# Patient Record
Sex: FEMALE | Race: WHITE
[De-identification: names, ages, dates, MRNs, and addresses within clinical notes are randomized per-mention and may not be internally consistent; named-entity substitution may affect disease eponyms.]

---

## 2019-02-05 ENCOUNTER — HOSPITAL ENCOUNTER (INPATIENT)
Dept: HOSPITAL 72 - EMR | Age: 59
LOS: 13 days | Discharge: NURSING, REHAB OR LONG-TERM CARE | DRG: 140 | End: 2019-02-18
Payer: MEDICAID

## 2019-02-05 VITALS — SYSTOLIC BLOOD PRESSURE: 121 MMHG | DIASTOLIC BLOOD PRESSURE: 73 MMHG

## 2019-02-05 VITALS — DIASTOLIC BLOOD PRESSURE: 66 MMHG | SYSTOLIC BLOOD PRESSURE: 116 MMHG

## 2019-02-05 VITALS — SYSTOLIC BLOOD PRESSURE: 135 MMHG | DIASTOLIC BLOOD PRESSURE: 60 MMHG

## 2019-02-05 VITALS — SYSTOLIC BLOOD PRESSURE: 133 MMHG | DIASTOLIC BLOOD PRESSURE: 72 MMHG

## 2019-02-05 VITALS — BODY MASS INDEX: 20.57 KG/M2 | WEIGHT: 123.44 LBS | HEIGHT: 65 IN

## 2019-02-05 VITALS — SYSTOLIC BLOOD PRESSURE: 103 MMHG | DIASTOLIC BLOOD PRESSURE: 75 MMHG

## 2019-02-05 DIAGNOSIS — J96.01: ICD-10-CM

## 2019-02-05 DIAGNOSIS — J96.02: ICD-10-CM

## 2019-02-05 DIAGNOSIS — Z59.0: ICD-10-CM

## 2019-02-05 DIAGNOSIS — J44.1: Primary | ICD-10-CM

## 2019-02-05 DIAGNOSIS — B19.20: ICD-10-CM

## 2019-02-05 DIAGNOSIS — G93.41: ICD-10-CM

## 2019-02-05 DIAGNOSIS — J18.9: ICD-10-CM

## 2019-02-05 DIAGNOSIS — J44.0: ICD-10-CM

## 2019-02-05 LAB
ADD MANUAL DIFF: NO
ALBUMIN SERPL-MCNC: 3.3 G/DL (ref 3.4–5)
ALBUMIN/GLOB SERPL: 1 {RATIO} (ref 1–2.7)
ALP SERPL-CCNC: 71 U/L (ref 46–116)
ALT SERPL-CCNC: 101 U/L (ref 12–78)
ANION GAP SERPL CALC-SCNC: 5 MMOL/L (ref 5–15)
APPEARANCE UR: CLEAR
APTT PPP: YELLOW S
AST SERPL-CCNC: 135 U/L (ref 15–37)
BASOPHILS NFR BLD AUTO: 0.7 % (ref 0–2)
BILIRUB SERPL-MCNC: 0.4 MG/DL (ref 0.2–1)
BUN SERPL-MCNC: 24 MG/DL (ref 7–18)
CALCIUM SERPL-MCNC: 9 MG/DL (ref 8.5–10.1)
CHLORIDE SERPL-SCNC: 103 MMOL/L (ref 98–107)
CO2 SERPL-SCNC: 32 MMOL/L (ref 21–32)
CREAT SERPL-MCNC: 0.7 MG/DL (ref 0.55–1.3)
EOSINOPHIL NFR BLD AUTO: 0.4 % (ref 0–3)
ERYTHROCYTE [DISTWIDTH] IN BLOOD BY AUTOMATED COUNT: 12.8 % (ref 11.6–14.8)
GLOBULIN SER-MCNC: 3.4 G/DL
GLUCOSE UR STRIP-MCNC: NEGATIVE MG/DL
HCT VFR BLD CALC: 41.5 % (ref 37–47)
HGB BLD-MCNC: 13.8 G/DL (ref 12–16)
KETONES UR QL STRIP: NEGATIVE
LEUKOCYTE ESTERASE UR QL STRIP: (no result)
LYMPHOCYTES NFR BLD AUTO: 17.3 % (ref 20–45)
MCV RBC AUTO: 87 FL (ref 80–99)
MONOCYTES NFR BLD AUTO: 6 % (ref 1–10)
NEUTROPHILS NFR BLD AUTO: 75.7 % (ref 45–75)
NITRITE UR QL STRIP: NEGATIVE
PH UR STRIP: 5 [PH] (ref 4.5–8)
PLATELET # BLD: 218 K/UL (ref 150–450)
POTASSIUM SERPL-SCNC: 3.8 MMOL/L (ref 3.5–5.1)
PROT UR QL STRIP: (no result)
RBC # BLD AUTO: 4.77 M/UL (ref 4.2–5.4)
SODIUM SERPL-SCNC: 139 MMOL/L (ref 136–145)
SP GR UR STRIP: 1.02 (ref 1–1.03)
UROBILINOGEN UR-MCNC: 4 MG/DL (ref 0–1)
WBC # BLD AUTO: 9.3 K/UL (ref 4.8–10.8)

## 2019-02-05 PROCEDURE — 83690 ASSAY OF LIPASE: CPT

## 2019-02-05 PROCEDURE — 80329 ANALGESICS NON-OPIOID 1 OR 2: CPT

## 2019-02-05 PROCEDURE — 80048 BASIC METABOLIC PNL TOTAL CA: CPT

## 2019-02-05 PROCEDURE — 84443 ASSAY THYROID STIM HORMONE: CPT

## 2019-02-05 PROCEDURE — 94640 AIRWAY INHALATION TREATMENT: CPT

## 2019-02-05 PROCEDURE — 87522 HEPATITIS C REVRS TRNSCRPJ: CPT

## 2019-02-05 PROCEDURE — 86705 HEP B CORE ANTIBODY IGM: CPT

## 2019-02-05 PROCEDURE — 94660 CPAP INITIATION&MGMT: CPT

## 2019-02-05 PROCEDURE — 85025 COMPLETE CBC W/AUTO DIFF WBC: CPT

## 2019-02-05 PROCEDURE — 94664 DEMO&/EVAL PT USE INHALER: CPT

## 2019-02-05 PROCEDURE — 87081 CULTURE SCREEN ONLY: CPT

## 2019-02-05 PROCEDURE — 83880 ASSAY OF NATRIURETIC PEPTIDE: CPT

## 2019-02-05 PROCEDURE — 86709 HEPATITIS A IGM ANTIBODY: CPT

## 2019-02-05 PROCEDURE — 93005 ELECTROCARDIOGRAM TRACING: CPT

## 2019-02-05 PROCEDURE — 86710 INFLUENZA VIRUS ANTIBODY: CPT

## 2019-02-05 PROCEDURE — 99285 EMERGENCY DEPT VISIT HI MDM: CPT

## 2019-02-05 PROCEDURE — 82962 GLUCOSE BLOOD TEST: CPT

## 2019-02-05 PROCEDURE — 71045 X-RAY EXAM CHEST 1 VIEW: CPT

## 2019-02-05 PROCEDURE — 84484 ASSAY OF TROPONIN QUANT: CPT

## 2019-02-05 PROCEDURE — 87340 HEPATITIS B SURFACE AG IA: CPT

## 2019-02-05 PROCEDURE — 36600 WITHDRAWAL OF ARTERIAL BLOOD: CPT

## 2019-02-05 PROCEDURE — 80053 COMPREHEN METABOLIC PANEL: CPT

## 2019-02-05 PROCEDURE — 94760 N-INVAS EAR/PLS OXIMETRY 1: CPT

## 2019-02-05 PROCEDURE — 86703 HIV-1/HIV-2 1 RESULT ANTBDY: CPT

## 2019-02-05 PROCEDURE — 36415 COLL VENOUS BLD VENIPUNCTURE: CPT

## 2019-02-05 PROCEDURE — 86803 HEPATITIS C AB TEST: CPT

## 2019-02-05 PROCEDURE — 85007 BL SMEAR W/DIFF WBC COUNT: CPT

## 2019-02-05 PROCEDURE — 81001 URINALYSIS AUTO W/SCOPE: CPT

## 2019-02-05 PROCEDURE — 82803 BLOOD GASES ANY COMBINATION: CPT

## 2019-02-05 PROCEDURE — 87040 BLOOD CULTURE FOR BACTERIA: CPT

## 2019-02-05 SDOH — ECONOMIC STABILITY - HOUSING INSECURITY: HOMELESSNESS: Z59.0

## 2019-02-05 NOTE — NUR
NURSE NOTES:

Received pt. from ED via [place]. Pt. transferred to bed without any incident. Report 
received from GABINO Leonard. Cardiac monitor is in placed, IV site asymtp

-------------------------------------------------------------------------------

Addendum: 02/06/19 at 0123 by Elodia RODRIGUEZ Mai, RN

-------------------------------------------------------------------------------

Received pt. from ED via gurney. Pt transferred to bed without any incident. [place] pt. to 
unit, room, and hospital policies. Received report from GABINO Leonard. Cardiac monitor in 
placed, IV site intact, asymptomatic and patent. Bed is in the lowest position and locked. 
Call light within reach. Belongings list checked and accounted. No SOB and acute distress 
noted at this time. Will contact Dr. Coombs for admission orders.

## 2019-02-05 NOTE — NUR
ED Nurse Note:

Patient bibkeenan from the [facility] complaining of generalized body pain, at time of 
arrival patient was in respiratory distress, satting at 75% on room air, 
patient was placed on a nonrebreather mask and patient's saturation went up to 
100%. patient is awake and oriented and is able to answer staff's auestions. 
denies any pain at this time

## 2019-02-05 NOTE — NUR
ED Nurse Note:



PT is transfered to TELE with RN SILVINA PASCAL. pt status, condition and vital signs, 
status, and condition are reported to ERMD and receving RN prior to transfer. 
pt vital signs are stable and pt is stable for transfer at this time.

## 2019-02-05 NOTE — EMERGENCY ROOM REPORT
History of Present Illness


General


Chief Complaint:  Generalized Weakness


Source:  EMS





Present Illness


HPI


Patient is a 58-year-old female brought in by EMS after increased difficulty 

breathing and generalized weakness.  Patient was noted to have unknown past 

medical history.  History is markedly limited by patient's mental status.  

Patient was noted to have diminished oxygen saturation.


Allergies:  


Coded Allergies:  


     No Known Allergies (Unverified , 2/5/19)





Patient History


Past Medical History:  see triage record


Reviewed Nursing Documentation:  PMH: Agreed; PSxH: Agreed





Nursing Documentation-PMH


Past Medical History:  No Stated History





Review of Systems


All Other Systems:  negative except mentioned in HPI





Physical Exam





Vital Signs








  Date Time  Temp Pulse Resp B/P (MAP) Pulse Ox O2 Delivery O2 Flow Rate FiO2


 


2/5/19 15:11 97.3 76 16 124/78 80 Room Air  


 


2/5/19 15:29        21


 


2/5/19 15:39       10.0 








Sp02 EP Interpretation:  reviewed, normal


General Appearance:  normal inspection, Chronically Ill


Head:  atraumatic


ENT:  normal ENT inspection, normal voice


Neck:  normal inspection, full range of motion, supple, no bony tend


Respiratory:  no retraction, accessory muscle use, wheezing


Cardiovascular #1:  regular rate, rhythm, no edema


Gastrointestinal:  normal inspection, normal bowel sounds, non tender, soft, no 

guarding, no hernia


Genitourinary:  no CVA tenderness


Musculoskeletal:  normal inspection, back normal, normal range of motion


Neurologic:  alert, responsive


Psychiatric:  normal inspection, judgement/insight normal, mood/affect normal


Skin:  no rash





Medical Decision Making


Diagnostic Impression:  


 Primary Impression:  


 COPD exacerbation


ER Course


Patient presented for shortness of breath.  Differential included but was not 

limited to anemia, pneumonia, pneumothorax, myocardial infarction, pericardial 

effusion, congestive heart failure, acidosis.  Because of complexity of patient'

s case laboratory testing and imaging studies were ordered.  Patient is noted 

to have some slight hyperinflation on chest x-ray.  Patient was given IV 

steroids as well as breathing treatments.  Patient's initial blood gas showed 

some elevation of her CO2.  Patient was noted to have some improvement after a 

repeat blood gas.  Patient is noted to have some increased cough.  Dr. Melia Valiente was contacted for inpatient management





Labs








Test


  2/5/19


15:35 2/5/19


18:15


 


White Blood Count


  9.3 K/UL


(4.8-10.8) 


 


 


Red Blood Count


  4.77 M/UL


(4.20-5.40) 


 


 


Hemoglobin


  13.8 G/DL


(12.0-16.0) 


 


 


Hematocrit


  41.5 %


(37.0-47.0) 


 


 


Mean Corpuscular Volume 87 FL (80-99)  


 


Mean Corpuscular Hemoglobin


  28.9 PG


(27.0-31.0) 


 


 


Mean Corpuscular Hemoglobin


Concent 33.2 G/DL


(32.0-36.0) 


 


 


Red Cell Distribution Width


  12.8 %


(11.6-14.8) 


 


 


Platelet Count


  218 K/UL


(150-450) 


 


 


Mean Platelet Volume


  6.7 FL


(6.5-10.1) 


 


 


Neutrophils (%) (Auto)


  75.7 %


(45.0-75.0) 


 


 


Lymphocytes (%) (Auto)


  17.3 %


(20.0-45.0) 


 


 


Monocytes (%) (Auto)


  6.0 %


(1.0-10.0) 


 


 


Eosinophils (%) (Auto)


  0.4 %


(0.0-3.0) 


 


 


Basophils (%) (Auto)


  0.7 %


(0.0-2.0) 


 


 


Urine Color Yellow  


 


Urine Appearance Clear  


 


Urine pH 5 (4.5-8.0)  


 


Urine Specific Gravity


  1.020


(1.005-1.035) 


 


 


Urine Protein 1+ (NEGATIVE)  


 


Urine Glucose (UA)


  Negative


(NEGATIVE) 


 


 


Urine Ketones


  Negative


(NEGATIVE) 


 


 


Urine Blood 2+ (NEGATIVE)  


 


Urine Nitrite


  Negative


(NEGATIVE) 


 


 


Urine Bilirubin


  Negative


(NEGATIVE) 


 


 


Urine Urobilinogen


  4 MG/DL


(0.0-1.0) 


 


 


Urine Leukocyte Esterase 1+ (NEGATIVE)  


 


Urine RBC


  2-4 /HPF (0 -


2) 


 


 


Urine WBC


  0-2 /HPF (0 -


2) 


 


 


Urine Squamous Epithelial


Cells Few /LPF


(NONE/OCC) 


 


 


Urine Bacteria


  Few /HPF


(NONE) 


 


 


Sodium Level


  139 MMOL/L


(136-145) 


 


 


Potassium Level


  3.8 MMOL/L


(3.5-5.1) 


 


 


Chloride Level


  103 MMOL/L


() 


 


 


Carbon Dioxide Level


  32 MMOL/L


(21-32) 


 


 


Anion Gap


  5 mmol/L


(5-15) 


 


 


Blood Urea Nitrogen


  24 mg/dL


(7-18) 


 


 


Creatinine


  0.7 MG/DL


(0.55-1.30) 


 


 


Estimat Glomerular Filtration


Rate > 60 mL/min


(>60) 


 


 


Glucose Level


  115 MG/DL


() 


 


 


Calcium Level


  9.0 MG/DL


(8.5-10.1) 


 


 


Total Bilirubin


  0.4 MG/DL


(0.2-1.0) 


 


 


Aspartate Amino Transf


(AST/SGOT) 135 U/L


(15-37) 


 


 


Alanine Aminotransferase


(ALT/SGPT) 101 U/L


(12-78) 


 


 


Alkaline Phosphatase


  71 U/L


() 


 


 


Troponin I


  0.016 ng/mL


(0.000-0.056) 


 


 


Pro-B-Type Natriuretic Peptide


  914 pg/mL


(0-125) 


 


 


Total Protein


  6.7 G/DL


(6.4-8.2) 


 


 


Albumin


  3.3 G/DL


(3.4-5.0) 


 


 


Globulin 3.4 g/dL  


 


Albumin/Globulin Ratio 1.0 (1.0-2.7)  


 


Lipase


  154 U/L


() 


 


 


Thyroid Stimulating Hormone


(TSH) 1.810 uiU/mL


(0.358-3.740) 


 


 


Serum Alcohol < 3 mg/dL  


 


Arterial Blood pH


  


  7.291


(7.350-7.450)


 


Arterial Blood Partial


Pressure CO2 


  61.7 mmHg


(35.0-45.0)


 


Arterial Blood Partial


Pressure O2 


  84.5 mmHg


(75.0-100.0)


 


Arterial Blood HCO3


  


  29.1 mmol/L


(22.0-26.0)


 


Arterial Blood Oxygen


Saturation 


  95.4 %


()


 


Arterial Blood Base Excess  1.1 (-2-2) 


 


Fredy Test  Positive 








EKG Diagnostic Results


Rate:  normal - 90


Rhythm:  NSR


ST Segments:  no acute changes





Last Vital Signs








  Date Time  Temp Pulse Resp B/P (MAP) Pulse Ox O2 Delivery O2 Flow Rate FiO2


 


2/5/19 19:58 97.7 80 19 121/73 100 Simple Mask 4.0 35








Status:  unchanged


Disposition:  ADMITTED AS INPATIENT


Condition:  Serious


Scripts


No Active Prescriptions or Reported Meds


Referrals:  


NOT CHOSEN IPA/MD,REFERRING (PCP)











Daniel Wilson MD Feb 5, 2019 22:06

## 2019-02-06 VITALS — SYSTOLIC BLOOD PRESSURE: 114 MMHG | DIASTOLIC BLOOD PRESSURE: 69 MMHG

## 2019-02-06 VITALS — DIASTOLIC BLOOD PRESSURE: 77 MMHG | SYSTOLIC BLOOD PRESSURE: 123 MMHG

## 2019-02-06 VITALS — SYSTOLIC BLOOD PRESSURE: 117 MMHG | DIASTOLIC BLOOD PRESSURE: 66 MMHG

## 2019-02-06 VITALS — SYSTOLIC BLOOD PRESSURE: 124 MMHG | DIASTOLIC BLOOD PRESSURE: 69 MMHG

## 2019-02-06 VITALS — SYSTOLIC BLOOD PRESSURE: 127 MMHG | DIASTOLIC BLOOD PRESSURE: 68 MMHG

## 2019-02-06 VITALS — DIASTOLIC BLOOD PRESSURE: 66 MMHG | SYSTOLIC BLOOD PRESSURE: 108 MMHG

## 2019-02-06 LAB
ADD MANUAL DIFF: NO
ALBUMIN SERPL-MCNC: 2.7 G/DL (ref 3.4–5)
ALBUMIN/GLOB SERPL: 0.9 {RATIO} (ref 1–2.7)
ALP SERPL-CCNC: 60 U/L (ref 46–116)
ALT SERPL-CCNC: 79 U/L (ref 12–78)
ANION GAP SERPL CALC-SCNC: 6 MMOL/L (ref 5–15)
AST SERPL-CCNC: 86 U/L (ref 15–37)
BASOPHILS NFR BLD AUTO: 0.7 % (ref 0–2)
BILIRUB SERPL-MCNC: 0.5 MG/DL (ref 0.2–1)
BUN SERPL-MCNC: 18 MG/DL (ref 7–18)
CALCIUM SERPL-MCNC: 9.2 MG/DL (ref 8.5–10.1)
CHLORIDE SERPL-SCNC: 106 MMOL/L (ref 98–107)
CO2 SERPL-SCNC: 32 MMOL/L (ref 21–32)
CREAT SERPL-MCNC: 0.7 MG/DL (ref 0.55–1.3)
EOSINOPHIL NFR BLD AUTO: 1.2 % (ref 0–3)
ERYTHROCYTE [DISTWIDTH] IN BLOOD BY AUTOMATED COUNT: 13.2 % (ref 11.6–14.8)
GLOBULIN SER-MCNC: 3 G/DL
HCT VFR BLD CALC: 38.5 % (ref 37–47)
HGB BLD-MCNC: 12.6 G/DL (ref 12–16)
LYMPHOCYTES NFR BLD AUTO: 23.4 % (ref 20–45)
MCV RBC AUTO: 88 FL (ref 80–99)
MONOCYTES NFR BLD AUTO: 7.8 % (ref 1–10)
NEUTROPHILS NFR BLD AUTO: 67 % (ref 45–75)
PLATELET # BLD: 196 K/UL (ref 150–450)
POTASSIUM SERPL-SCNC: 3.6 MMOL/L (ref 3.5–5.1)
RBC # BLD AUTO: 4.37 M/UL (ref 4.2–5.4)
SODIUM SERPL-SCNC: 144 MMOL/L (ref 136–145)
WBC # BLD AUTO: 7 K/UL (ref 4.8–10.8)

## 2019-02-06 RX ADMIN — SODIUM CHLORIDE SCH MLS/HR: 0.9 INJECTION INTRAVENOUS at 14:58

## 2019-02-06 RX ADMIN — IPRATROPIUM BROMIDE AND ALBUTEROL SULFATE SCH ML: .5; 3 SOLUTION RESPIRATORY (INHALATION) at 19:55

## 2019-02-06 NOTE — NUR
RESPIRATORY NOTE:



Patient placed on BiPAP per Dr's orders. Foam tape added around the bridge of the nose and 
around the cheeks for extra protection from the face mask. GABINO Franco made aware. Will follow 
with an ABG scheduled at 1700.

## 2019-02-06 NOTE — NUR
NURSE NOTES:



Called Dr. Rodriguez to inform him that per Kathy Hendrickson, nursing supervisor, it is best to transfer 
the patient to ICU instead of [facility]. Awaiting callback.

## 2019-02-06 NOTE — CONSULTATION
History of Present Illness


General


Date patient seen:  Feb 6, 2019


Time patient seen:  14:52


Chief Complaint:  Generalized Weakness


Referring physician:  GISELLE MARIN


Reason for Consultation:  Transaminitis





Present Illness


HPI


57 y/o female w/ hx COPD, homelessness admitted with acute hypoxemic 

respiratory failure and generalized body weakness and transaminitis.  Not able 

to give much history due to mental status.  Noted with acute hypercapnic 

respiratory failure on ABG.  CXR clear.


Allergies:  


Coded Allergies:  


     No Known Allergies (Unverified , 2/5/19)





Medication History


No Active Prescriptions or Reported Meds





Patient History


Healthcare decision maker





Resuscitation status


Full Code


Advanced Directive on File








Past Medical/Surgical History


Past Medical/Surgical History:  


(1) COPD exacerbation





Review of Systems


ROS Narrative


Unable to obtain due to patient factors





Physical Exam


General Appearance:  lethargic


HEENT:  mucous membranes moist


Neck:  supple


Respiratory/Chest:  decreased breath sounds, expiratory wheezing


Cardiovascular/Chest:  normal rate, regular rhythm


Abdomen:  soft


Extremities:  no edema





Last 24 Hour Vital Signs








  Date Time  Temp Pulse Resp B/P (MAP) Pulse Ox O2 Delivery O2 Flow Rate FiO2


 


2/6/19 08:00 97.5 102 18 117/66 (83) 97   


 


2/6/19 04:00  95      


 


2/6/19 04:00 97.9 90 19 127/68 (87) 94   


 


2/6/19 00:00 98.2 95 17 108/66 (80) 96   


 


2/6/19 00:00  95      


 


2/5/19 22:25      Nasal Cannula 3.0 32


 


2/5/19 22:25     90 Nasal Cannula 3.0 32


 


2/5/19 21:43      Nasal Cannula 2.0 


 


2/5/19 20:22  89      


 


2/5/19 20:10 98.1 87 17 116/66 (83) 94   


 


2/5/19 19:58 97.7 80 19 121/73 100 Simple Mask 4.0 35


 


2/5/19 19:57 97.7 80 19 121/73 100 Simple Mask 4.0 


 


2/5/19 18:55  87 22  100 Venturi Mask 6.0 35


 


2/5/19 18:45  91 21  95 Simple Mask 5.0 


 


2/5/19 18:21 97.7 84 20 133/72 98 Simple Mask 4.0 


 


2/5/19 17:06 97.5 90 19 135/60 98 Simple Mask 10.0 


 


2/5/19 15:39  90 20  100 Non-Rebreather 10.0 100


 


2/5/19 15:29  80 18  100 Room Air  21


 


2/5/19 15:29  80 18   Room Air  21


 


2/5/19 15:20 97.3 76 16 103/75 80 Room Air  


 


2/5/19 15:20  76 16   Room Air  


 


2/5/19 15:11 97.3 76 16 124/78 80 Room Air  

















Intake and Output  


 


 2/5/19 2/6/19





 19:00 07:00


 


Intake Total  240 ml


 


Balance  240 ml


 


  


 


Intake Oral  240 ml


 


# Voids 1 











Laboratory Tests








Test


  2/5/19


15:35 2/5/19


15:40 2/5/19


18:15 2/6/19


05:15


 


White Blood Count


  9.3 K/UL


(4.8-10.8) 


  


  7.0 K/UL


(4.8-10.8)


 


Red Blood Count


  4.77 M/UL


(4.20-5.40) 


  


  4.37 M/UL


(4.20-5.40)


 


Hemoglobin


  13.8 G/DL


(12.0-16.0) 


  


  12.6 G/DL


(12.0-16.0)


 


Hematocrit


  41.5 %


(37.0-47.0) 


  


  38.5 %


(37.0-47.0)


 


Mean Corpuscular Volume 87 FL (80-99)     88 FL (80-99)  


 


Mean Corpuscular Hemoglobin


  28.9 PG


(27.0-31.0) 


  


  28.8 PG


(27.0-31.0)


 


Mean Corpuscular Hemoglobin


Concent 33.2 G/DL


(32.0-36.0) 


  


  32.8 G/DL


(32.0-36.0)


 


Red Cell Distribution Width


  12.8 %


(11.6-14.8) 


  


  13.2 %


(11.6-14.8)


 


Platelet Count


  218 K/UL


(150-450) 


  


  196 K/UL


(150-450)


 


Mean Platelet Volume


  6.7 FL


(6.5-10.1) 


  


  7.9 FL


(6.5-10.1)


 


Neutrophils (%) (Auto)


  75.7 %


(45.0-75.0)  H 


  


  67.0 %


(45.0-75.0)


 


Lymphocytes (%) (Auto)


  17.3 %


(20.0-45.0)  L 


  


  23.4 %


(20.0-45.0)


 


Monocytes (%) (Auto)


  6.0 %


(1.0-10.0) 


  


  7.8 %


(1.0-10.0)


 


Eosinophils (%) (Auto)


  0.4 %


(0.0-3.0) 


  


  1.2 %


(0.0-3.0)


 


Basophils (%) (Auto)


  0.7 %


(0.0-2.0) 


  


  0.7 %


(0.0-2.0)


 


Urine Color Yellow     


 


Urine Appearance Clear     


 


Urine pH 5 (4.5-8.0)     


 


Urine Specific Gravity


  1.020


(1.005-1.035) 


  


  


 


 


Urine Protein


  1+ (NEGATIVE)


H 


  


  


 


 


Urine Glucose (UA)


  Negative


(NEGATIVE) 


  


  


 


 


Urine Ketones


  Negative


(NEGATIVE) 


  


  


 


 


Urine Blood


  2+ (NEGATIVE)


H 


  


  


 


 


Urine Nitrite


  Negative


(NEGATIVE) 


  


  


 


 


Urine Bilirubin


  Negative


(NEGATIVE) 


  


  


 


 


Urine Urobilinogen


  4 MG/DL


(0.0-1.0)  H 


  


  


 


 


Urine Leukocyte Esterase


  1+ (NEGATIVE)


H 


  


  


 


 


Urine RBC


  2-4 /HPF (0 -


2)  H 


  


  


 


 


Urine WBC


  0-2 /HPF (0 -


2) 


  


  


 


 


Urine Squamous Epithelial


Cells Few /LPF


(NONE/OCC) 


  


  


 


 


Urine Bacteria


  Few /HPF


(NONE) 


  


  


 


 


Sodium Level


  139 MMOL/L


(136-145) 


  


  144 MMOL/L


(136-145)


 


Potassium Level


  3.8 MMOL/L


(3.5-5.1) 


  


  3.6 MMOL/L


(3.5-5.1)


 


Chloride Level


  103 MMOL/L


() 


  


  106 MMOL/L


()


 


Carbon Dioxide Level


  32 MMOL/L


(21-32) 


  


  32 MMOL/L


(21-32)


 


Anion Gap


  5 mmol/L


(5-15) 


  


  6 mmol/L


(5-15)


 


Blood Urea Nitrogen


  24 mg/dL


(7-18)  H 


  


  18 mg/dL


(7-18)


 


Creatinine


  0.7 MG/DL


(0.55-1.30) 


  


  0.7 MG/DL


(0.55-1.30)


 


Estimat Glomerular Filtration


Rate > 60 mL/min


(>60) 


  


  > 60 mL/min


(>60)


 


Glucose Level


  115 MG/DL


()  H 


  


  78 MG/DL


()


 


Calcium Level


  9.0 MG/DL


(8.5-10.1) 


  


  9.2 MG/DL


(8.5-10.1)


 


Total Bilirubin


  0.4 MG/DL


(0.2-1.0) 


  


  0.5 MG/DL


(0.2-1.0)


 


Aspartate Amino Transf


(AST/SGOT) 135 U/L


(15-37)  H 


  


  86 U/L (15-37)


H


 


Alanine Aminotransferase


(ALT/SGPT) 101 U/L


(12-78)  H 


  


  79 U/L (12-78)


H


 


Alkaline Phosphatase


  71 U/L


() 


  


  60 U/L


()


 


Troponin I


  0.016 ng/mL


(0.000-0.056) 


  


  


 


 


Pro-B-Type Natriuretic Peptide


  914 pg/mL


(0-125)  H 


  


  


 


 


Total Protein


  6.7 G/DL


(6.4-8.2) 


  


  5.7 G/DL


(6.4-8.2)  L


 


Albumin


  3.3 G/DL


(3.4-5.0)  L 


  


  2.7 G/DL


(3.4-5.0)  L


 


Globulin 3.4 g/dL     3.0 g/dL  


 


Albumin/Globulin Ratio


  1.0 (1.0-2.7)  


  


  


  0.9 (1.0-2.7)


L


 


Lipase


  154 U/L


() 


  


  


 


 


Thyroid Stimulating Hormone


(TSH) 1.810 uiU/mL


(0.358-3.740) 


  


  


 


 


Serum Alcohol < 3 mg/dL     


 


Arterial Blood pH


  


  7.269


(7.350-7.450) 7.291


(7.350-7.450) 


 


 


Arterial Blood Partial


Pressure CO2 


  66.7 mmHg


(35.0-45.0)  *H 61.7 mmHg


(35.0-45.0)  *H 


 


 


Arterial Blood Partial


Pressure O2 


  108.9 mmHg


(75.0-100.0)  H 84.5 mmHg


(75.0-100.0) 


 


 


Arterial Blood HCO3


  


  29.9 mmol/L


(22.0-26.0)  H 29.1 mmol/L


(22.0-26.0)  H 


 


 


Arterial Blood Oxygen


Saturation 


  97.3 %


() 95.4 %


() 


 


 


Arterial Blood Base Excess  1.2 (-2-2)   1.1 (-2-2)   


 


Fredy Test  Positive   Positive   











Microbiology








 Date/Time


Source Procedure


Growth Status


 


 


 2/5/19 15:20


Nasal Nares Influenza Types A,B Antigen (DANIELA) - Final Complete








Height (Feet):  5


Height (Inches):  5.00


Weight (Pounds):  123


Medications





Current Medications








 Medications


  (Trade)  Dose


 Ordered  Sig/Mona


 Route


 PRN Reason  Start Time


 Stop Time Status Last Admin


Dose Admin


 


 Albuterol/


 Ipratropium


  (Albuterol/


 Ipratropium)  3 ml  Q4H  PRN


 HHN


 Shortness of Breath  2/5/19 20:45


 2/10/19 20:44   


 


 


 Albuterol/


 Ipratropium


  (Albuterol/


 Ipratropium)  3 ml  Q6HRT


 HHN


   2/6/19 19:00


 2/11/19 18:59 UNV  


 


 


 Azithromycin


  (Zithromax)  250 mg  DAILY


 ORAL


   2/7/19 09:00


 2/14/19 08:59   


 


 


 Azithromycin


  (Zithromax)  500 mg  ONCE


 ORAL


   2/6/19 13:30


 2/6/19 15:00   


 


 


 Ceftriaxone


 Sodium 1 gm/


 Dextrose  55 ml @ 


 110 mls/hr  Q24H


 IVPB


   2/6/19 14:30


 2/13/19 14:29   


 


 


 Ibuprofen


  (Advil)  400 mg  Q6H  PRN


 ORAL


 For Pain  2/5/19 20:45


 3/7/19 20:44   


 


 


 Prednisone


  (predniSONE)  60 mg  ONCE  ONCE


 ORAL


   2/6/19 15:00


 2/6/19 15:01 UNV  


 











Assessment/Plan


Assessment/Plan


Problem List:


1. Acute hypoxemic and hypercapnic respiratory failure


2. COPD w/ acute exacerbation


3. Weakness/fatigue


4. Transaminitis





Plan:


-bipap 12/5 


-monitor ABG


-prednisone 60 mg daily and taper as tolerated


-Monitor LFTs


-abx: ceftriaxone/azithro


-f/u cultures


-monitor mental status











Benji Rodriguez MD Feb 6, 2019 14:56

## 2019-02-06 NOTE — NUR
Social Service Note



SW attempt to meet with patient to screen for homelessness.  Patient only responsive to name 
when called.  Patient only briefly opened her eyes and mumbled a few words.  Patient 
appeared to have vomited.  Charge nurse notified.  Will monitor and follow up once patient 
is more alert.

## 2019-02-06 NOTE — GI INITIAL CONSULT NOTE
History of Present Illness


General


Date patient seen:  Feb 6, 2019


Time patient seen:  13:28


Reason for Hospitalization:  Generalized Weakness


Referring physician:  GISELLE MARIN


Reason for Consultation:  Transaminitis





Present Illness


HPI


Patient is a 58-year-old female brought in by EMS after increased difficulty 

breathing and generalized weakness.  Patient was noted to have unknown past 

medical history.  History is markedly limited by patient's mental status.  

Patient was noted to have diminished oxygen saturation.


GI consulted for transaminitis.  Patient seen, awake alert and oriented no 

apparent distress, no SOB.   no active signs or symptoms of nausea vomiting.  

Patient denies any abdominal pain, denies any  constipation or diarrhea.  Labs 

reviewed; no anemia.  No leukocytosis.  Mild transaminitis.  No history of 

endoscopic colonoscopy.


Home Meds


No Active Prescriptions or Reported Meds


Med list reviewed/reconciled:  Yes


Allergies:  


Coded Allergies:  


     No Known Allergies (Unverified , 2/5/19)





Patient History


History Provided By:  Patient, Medical Record


PMH Narrative


Past Medical History:  see triage record


Reviewed Nursing Documentation:  PMH: Agreed; PSxH: Agreed





Nursing Documentation-PMH


Past Medical History:  No Stated History


Social History:  Denies: smoking, alcohol use, drug use, other





Review of Systems


All Other Systems:  negative except mentioned in HPI





Physical Exam





Vital Signs








  Date Time  Temp Pulse Resp B/P (MAP) Pulse Ox O2 Delivery O2 Flow Rate FiO2


 


2/5/19 15:11 97.3 76 16 124/78 80 Room Air  


 


2/5/19 15:29        21


 


2/5/19 15:39       10.0 








Sp02 EP Interpretation:  reviewed, normal


Labs





Laboratory Tests








Test


  2/5/19


15:35 2/5/19


15:40 2/5/19


18:15 2/6/19


05:15


 


White Blood Count


  9.3 K/UL


(4.8-10.8) 


  


  7.0 K/UL


(4.8-10.8)


 


Red Blood Count


  4.77 M/UL


(4.20-5.40) 


  


  4.37 M/UL


(4.20-5.40)


 


Hemoglobin


  13.8 G/DL


(12.0-16.0) 


  


  12.6 G/DL


(12.0-16.0)


 


Hematocrit


  41.5 %


(37.0-47.0) 


  


  38.5 %


(37.0-47.0)


 


Mean Corpuscular Volume 87 FL (80-99)     88 FL (80-99)  


 


Mean Corpuscular Hemoglobin


  28.9 PG


(27.0-31.0) 


  


  28.8 PG


(27.0-31.0)


 


Mean Corpuscular Hemoglobin


Concent 33.2 G/DL


(32.0-36.0) 


  


  32.8 G/DL


(32.0-36.0)


 


Red Cell Distribution Width


  12.8 %


(11.6-14.8) 


  


  13.2 %


(11.6-14.8)


 


Platelet Count


  218 K/UL


(150-450) 


  


  196 K/UL


(150-450)


 


Mean Platelet Volume


  6.7 FL


(6.5-10.1) 


  


  7.9 FL


(6.5-10.1)


 


Neutrophils (%) (Auto)


  75.7 %


(45.0-75.0)  H 


  


  67.0 %


(45.0-75.0)


 


Lymphocytes (%) (Auto)


  17.3 %


(20.0-45.0)  L 


  


  23.4 %


(20.0-45.0)


 


Monocytes (%) (Auto)


  6.0 %


(1.0-10.0) 


  


  7.8 %


(1.0-10.0)


 


Eosinophils (%) (Auto)


  0.4 %


(0.0-3.0) 


  


  1.2 %


(0.0-3.0)


 


Basophils (%) (Auto)


  0.7 %


(0.0-2.0) 


  


  0.7 %


(0.0-2.0)


 


Urine Color Yellow     


 


Urine Appearance Clear     


 


Urine pH 5 (4.5-8.0)     


 


Urine Specific Gravity


  1.020


(1.005-1.035) 


  


  


 


 


Urine Protein


  1+ (NEGATIVE)


H 


  


  


 


 


Urine Glucose (UA)


  Negative


(NEGATIVE) 


  


  


 


 


Urine Ketones


  Negative


(NEGATIVE) 


  


  


 


 


Urine Blood


  2+ (NEGATIVE)


H 


  


  


 


 


Urine Nitrite


  Negative


(NEGATIVE) 


  


  


 


 


Urine Bilirubin


  Negative


(NEGATIVE) 


  


  


 


 


Urine Urobilinogen


  4 MG/DL


(0.0-1.0)  H 


  


  


 


 


Urine Leukocyte Esterase


  1+ (NEGATIVE)


H 


  


  


 


 


Urine RBC


  2-4 /HPF (0 -


2)  H 


  


  


 


 


Urine WBC


  0-2 /HPF (0 -


2) 


  


  


 


 


Urine Squamous Epithelial


Cells Few /LPF


(NONE/OCC) 


  


  


 


 


Urine Bacteria


  Few /HPF


(NONE) 


  


  


 


 


Sodium Level


  139 MMOL/L


(136-145) 


  


  144 MMOL/L


(136-145)


 


Potassium Level


  3.8 MMOL/L


(3.5-5.1) 


  


  3.6 MMOL/L


(3.5-5.1)


 


Chloride Level


  103 MMOL/L


() 


  


  106 MMOL/L


()


 


Carbon Dioxide Level


  32 MMOL/L


(21-32) 


  


  32 MMOL/L


(21-32)


 


Anion Gap


  5 mmol/L


(5-15) 


  


  6 mmol/L


(5-15)


 


Blood Urea Nitrogen


  24 mg/dL


(7-18)  H 


  


  18 mg/dL


(7-18)


 


Creatinine


  0.7 MG/DL


(0.55-1.30) 


  


  0.7 MG/DL


(0.55-1.30)


 


Estimat Glomerular Filtration


Rate > 60 mL/min


(>60) 


  


  > 60 mL/min


(>60)


 


Glucose Level


  115 MG/DL


()  H 


  


  78 MG/DL


()


 


Calcium Level


  9.0 MG/DL


(8.5-10.1) 


  


  9.2 MG/DL


(8.5-10.1)


 


Total Bilirubin


  0.4 MG/DL


(0.2-1.0) 


  


  0.5 MG/DL


(0.2-1.0)


 


Aspartate Amino Transf


(AST/SGOT) 135 U/L


(15-37)  H 


  


  86 U/L (15-37)


H


 


Alanine Aminotransferase


(ALT/SGPT) 101 U/L


(12-78)  H 


  


  79 U/L (12-78)


H


 


Alkaline Phosphatase


  71 U/L


() 


  


  60 U/L


()


 


Troponin I


  0.016 ng/mL


(0.000-0.056) 


  


  


 


 


Pro-B-Type Natriuretic Peptide


  914 pg/mL


(0-125)  H 


  


  


 


 


Total Protein


  6.7 G/DL


(6.4-8.2) 


  


  5.7 G/DL


(6.4-8.2)  L


 


Albumin


  3.3 G/DL


(3.4-5.0)  L 


  


  2.7 G/DL


(3.4-5.0)  L


 


Globulin 3.4 g/dL     3.0 g/dL  


 


Albumin/Globulin Ratio


  1.0 (1.0-2.7)  


  


  


  0.9 (1.0-2.7)


L


 


Lipase


  154 U/L


() 


  


  


 


 


Thyroid Stimulating Hormone


(TSH) 1.810 uiU/mL


(0.358-3.740) 


  


  


 


 


Serum Alcohol < 3 mg/dL     


 


Arterial Blood pH


  


  7.269


(7.350-7.450) 7.291


(7.350-7.450) 


 


 


Arterial Blood Partial


Pressure CO2 


  66.7 mmHg


(35.0-45.0)  *H 61.7 mmHg


(35.0-45.0)  *H 


 


 


Arterial Blood Partial


Pressure O2 


  108.9 mmHg


(75.0-100.0)  H 84.5 mmHg


(75.0-100.0) 


 


 


Arterial Blood HCO3


  


  29.9 mmol/L


(22.0-26.0)  H 29.1 mmol/L


(22.0-26.0)  H 


 


 


Arterial Blood Oxygen


Saturation 


  97.3 %


() 95.4 %


() 


 


 


Arterial Blood Base Excess  1.2 (-2-2)   1.1 (-2-2)   


 


Fredy Test  Positive   Positive   








General Appearance:  well appearing, no apparent distress, alert


Head:  normocephalic


EENT:  PERRL/EOMI, normal ENT inspection


Neck:  supple


Respiratory:  normal breath sounds, no respiratory distress


Cardiovascular:  normal rate


Gastrointestinal:  normal inspection, non tender, soft, normal bowel sounds, non

-distended


Rectal:  deferred


Genitourinary:  no CVA tenderness


Musculoskeletal:  normal inspection, back normal


Neurologic:  normal inspection, alert, oriented x3, responsive


Psychiatric:  normal inspection, judgement/insight normal, memory normal


Skin:  normal inspection, normal color, no rash, warm/dry, palpation normal, 

well hydrated


Lymphatic:  normal inspection, no adenopathy


Current Medications





Current Medications








 Medications


  (Trade)  Dose


 Ordered  Sig/Mona


 Route


 PRN Reason  Start Time


 Stop Time Status Last Admin


Dose Admin


 


 Albuterol/


 Ipratropium


  (Albuterol/


 Ipratropium)  3 ml  Q4H  PRN


 HHN


 Shortness of Breath  2/5/19 20:45


 2/10/19 20:44   


 


 


 Azithromycin


  (Zithromax)  250 mg  DAILY


 ORAL


   2/7/19 09:00


 2/14/19 08:59 UNV  


 


 


 Azithromycin


  (Zithromax)  500 mg  ONCE  ONCE


 ORAL


   2/6/19 13:30


 2/6/19 13:31 UNV  


 


 


 Ceftriaxone


 Sodium 1 gm/


 Dextrose  55 ml @ 


 110 mls/hr  Q24H


 IVPB


   2/6/19 14:30


 2/13/19 14:29   


 


 


 Ibuprofen


  (Advil)  400 mg  Q6H  PRN


 ORAL


 For Pain  2/5/19 20:45


 3/7/19 20:44   


 











GI: Plan


Problems:  


(1) Transaminitis


(2) COPD exacerbation


Plan


Symptomatic treatment


okay to advance to regular diet


Zofran as needed


Order hepatitis panel


ppi


fu labs


Outpatient GI procedures





Discussed with Dr. Ndiaye.


Thank you for this patient referral, we will follow.





The patient was seen and examined at bedside and all new and available data was 

reviewed in the patients chart. I agree with the above findings, impression 

and plan.  (Patient seen earlier today. Signature stamp does not reflect 

patient encounter time.). - MD Mary Danielle,[facility] Feb 6, 2019 13:32

## 2019-02-06 NOTE — DIAGNOSTIC IMAGING REPORT
Indication: Dyspnea

 

Comparison:  None

 

A single view chest radiograph was obtained.

 

Findings:

 

The interstitium of the lung is somewhat prominent which is nonspecific and acuity

indeterminate. The heart is normal in size. The bones are slightly osteopenic. No

pleural effusions are seen.

 

IMPRESSION:

 

Interstitial prominence nonspecific

## 2019-02-06 NOTE — NUR
NURSE NOTES:

Received order from Dr. Rodriguez to transfer the patient to [facility], change the BIPAP setting to 
18/5.

## 2019-02-06 NOTE — CONSULTATION
DATE OF CONSULTATION:  02/06/2019

INFECTIOUS DISEASES CONSULTATION



CONSULTING PHYSICIAN:  Elijah Lewis M.D.



PRIMARY ATTENDING PHYSICIAN:  Melia Coombs M.D.



REASON FOR CONSULTATION:  COPD exacerbation.



HISTORY OF PRESENT ILLNESS:  The patient is a 58-year-old white female who

is homeless admitted last evening because of shortness of breath,

weakness.  It  was found the patient has decreased O2 saturation and has

hypercapnia in blood gas.  The patient is a very poor historian.



ALLERGIES:  No known drug allergies.



MEDICATIONS:  Getting albuterol ipratropium inhaler, ibuprofen.



SOCIAL HISTORY:  Homeless.  Denies smoking.  She does not answer other

questions.  The patient is single.



REVIEW OF SYSTEMS:  As per history of present illness.



PHYSICAL EXAMINATION:

VITAL SIGNS:  Temperature 97.5, pulse 102, blood pressure 117/66.

GENERAL APPEARANCE:  No acute distress.

HEAD AND NECK:  Getting oxygen by nasal cannula.  Pink conjunctivae.

HEART:  S1-S2 regular.

LUNGS:  Clear.  Decreased sounds.

ABDOMEN:  Soft and nontender.

EXTREMITIES:  No edema.

SKIN:  Rash in the buttocks and the posterior thigh.



LABORATORY AND DIAGNOSTIC DATA:  Influenza A and B test negative.  WBC 7,

hemoglobin 12.6, hematocrit 38.5, ______  Sodium 144, potassium 3.6,

chloride 106, bicarb 32, BUN 18, creatinine 0.7, AST 86, ALT 79, alkaline

phosphatase is 60. Bilirubin 0.5.  Blood gas showed pCO2 of 61.7, pO2

84.5.  Chest x-ray was normal.



IMPRESSION:

1. COPD exacerbation,

2. Hypercapnic respiratory failure.

3. Homelessness.



RECOMMENDATIONS:  We will start ceftriaxone, Zithromax.  We will try to

obtain more information about the patient when she becomes more

cooperative.



At the end of my exam, I thank Dr. Coombs, for involving me in the care

of this patient.









  ______________________________________________

  Elijah Lewis M.D.





DR:  Janine

D:  02/06/2019 13:32

T:  02/06/2019 17:49

JOB#:  [id number]

CC:

## 2019-02-06 NOTE — NUR
TRANSFER TO FLOOR:



Report given to GABINO Joseph. Patient was transferred to 2W SDU from Telemetry unit without 
incident. No signs of acute distress or pain noted. Belongings list checked with receiving 
RN. BiPAP at bedside. Bed at lowest position, brakes on, siderails up x3. Call light within 
reach. Will continue to monitor.

## 2019-02-06 NOTE — CARDIOLOGY REPORT
--------------- APPROVED REPORT --------------





EKG Measurement

Heart [id number]

ND [id number]

[id number]

[id number]

[id number]





Normal sinus rhythm with sinus arrhythmia

Normal ECG

## 2019-02-06 NOTE — NUR
CASE MANAGEMENT: REVIEW



58/F BIBA FROM STREET



CC: GENERALIZED WEAKNESS



SI: COPD EXACERBATION 

T 97.3 HR 76 RR 16 /78 % VENTURI MASK FIO2 35

ABG: PH 7.269 PCO2 66.7 PO2 108.9 HCO3 29.96 





IS: ALBUTEROL HHN X1

AZITHROMYCIN PO X1

PREDNISONE PO X1





***PATIENT ADMITTED TO TELEMETRY UNIT 02/05/2019***

DCP: PATIENT REPORTS BEING HOMELESS

## 2019-02-06 NOTE — NUR
NURSE NOTES:

pt has episodes of resltessness/ refusing nasal cannula and bipap dropping her sats to 50% 
on ra, relayed to Dr Rodriguez , no sedatives ordered, okayed for restraints , bilateral soft 
wrist restraits. Dr Coombs also aware

after restraints applied pt is 93% on bipap 12/5

## 2019-02-06 NOTE — NUR
NURSE NOTES:

Report received from GABINO Loredo.  Patient is Currently in semi monroe position in bed with 
Bipap on, setting of 18/5 with 32% oxygen.  Patient is alert, responsive. No S/Sx of pain is 
noted.  Wrist restraint present, no redness, swelling, or skin breakdown present at wrist 
area.  IV site to right AC 20g is intact.  Bed is in lowest position. Call light is within 
easy reach. Will continue to monitor.

## 2019-02-06 NOTE — NUR
NURSE NOTES:



Received report from GABINO Lewis. Patient is asleep lying semi-monroe's; resting comfortably. 
On 15/5 BiPAP. No signs of acute distress or pain noted at this time. AOx1-2; able to make 
needs known to a degree. Checked IV site; patent and flushed. No erythema, bleeding, or 
infiltration noted. Bed at lowest position, brakes on, siderails up x3. Bed alarm on 
following seizure precautions. Call light within reach. Will continue to monitor. 

-------------------------------------------------------------------------------

Addendum: 02/06/19 at 2301 by NE HICKMAN RN RN

-------------------------------------------------------------------------------

Bed alarm on following fall precautions**

## 2019-02-07 VITALS — DIASTOLIC BLOOD PRESSURE: 75 MMHG | SYSTOLIC BLOOD PRESSURE: 124 MMHG

## 2019-02-07 VITALS — DIASTOLIC BLOOD PRESSURE: 76 MMHG | SYSTOLIC BLOOD PRESSURE: 122 MMHG

## 2019-02-07 VITALS — SYSTOLIC BLOOD PRESSURE: 127 MMHG | DIASTOLIC BLOOD PRESSURE: 75 MMHG

## 2019-02-07 VITALS — DIASTOLIC BLOOD PRESSURE: 78 MMHG | SYSTOLIC BLOOD PRESSURE: 114 MMHG

## 2019-02-07 VITALS — DIASTOLIC BLOOD PRESSURE: 68 MMHG | SYSTOLIC BLOOD PRESSURE: 118 MMHG

## 2019-02-07 VITALS — SYSTOLIC BLOOD PRESSURE: 122 MMHG | DIASTOLIC BLOOD PRESSURE: 76 MMHG

## 2019-02-07 LAB
ADD MANUAL DIFF: YES
ALBUMIN SERPL-MCNC: 2.6 G/DL (ref 3.4–5)
ALBUMIN/GLOB SERPL: 0.8 {RATIO} (ref 1–2.7)
ALP SERPL-CCNC: 58 U/L (ref 46–116)
ALT SERPL-CCNC: 64 U/L (ref 12–78)
ANION GAP SERPL CALC-SCNC: 4 MMOL/L (ref 5–15)
AST SERPL-CCNC: 53 U/L (ref 15–37)
BILIRUB SERPL-MCNC: 0.3 MG/DL (ref 0.2–1)
BUN SERPL-MCNC: 18 MG/DL (ref 7–18)
CALCIUM SERPL-MCNC: 8.9 MG/DL (ref 8.5–10.1)
CHLORIDE SERPL-SCNC: 105 MMOL/L (ref 98–107)
CO2 SERPL-SCNC: 34 MMOL/L (ref 21–32)
CREAT SERPL-MCNC: 0.7 MG/DL (ref 0.55–1.3)
ERYTHROCYTE [DISTWIDTH] IN BLOOD BY AUTOMATED COUNT: 13.3 % (ref 11.6–14.8)
GLOBULIN SER-MCNC: 3.2 G/DL
HCT VFR BLD CALC: 37.6 % (ref 37–47)
HGB BLD-MCNC: 12.3 G/DL (ref 12–16)
MCV RBC AUTO: 89 FL (ref 80–99)
PLATELET # BLD: 185 K/UL (ref 150–450)
POTASSIUM SERPL-SCNC: 4 MMOL/L (ref 3.5–5.1)
RBC # BLD AUTO: 4.24 M/UL (ref 4.2–5.4)
SODIUM SERPL-SCNC: 143 MMOL/L (ref 136–145)
WBC # BLD AUTO: 15.7 K/UL (ref 4.8–10.8)

## 2019-02-07 RX ADMIN — IPRATROPIUM BROMIDE AND ALBUTEROL SULFATE SCH ML: .5; 3 SOLUTION RESPIRATORY (INHALATION) at 01:00

## 2019-02-07 RX ADMIN — IPRATROPIUM BROMIDE AND ALBUTEROL SULFATE SCH ML: .5; 3 SOLUTION RESPIRATORY (INHALATION) at 08:00

## 2019-02-07 RX ADMIN — AZITHROMYCIN DIHYDRATE SCH MG: 250 TABLET, FILM COATED ORAL at 08:42

## 2019-02-07 RX ADMIN — HEPARIN SODIUM SCH UNITS: 5000 INJECTION INTRAVENOUS; SUBCUTANEOUS at 20:31

## 2019-02-07 RX ADMIN — IPRATROPIUM BROMIDE AND ALBUTEROL SULFATE SCH ML: .5; 3 SOLUTION RESPIRATORY (INHALATION) at 20:02

## 2019-02-07 RX ADMIN — IPRATROPIUM BROMIDE AND ALBUTEROL SULFATE SCH ML: .5; 3 SOLUTION RESPIRATORY (INHALATION) at 13:50

## 2019-02-07 RX ADMIN — SODIUM CHLORIDE SCH MLS/HR: 0.9 INJECTION INTRAVENOUS at 15:34

## 2019-02-07 NOTE — HISTORY AND PHYSICAL REPORT
DATE OF ADMISSION:  02/05/2019

HISTORY OF PRESENT ILLNESS:  The patient has increased difficulty

breathing, was admitted for COPD exacerbation, and also has elevated LFTs.

The patient is very confused and said, I do not know, to everything,

cannot get any reliable history from the patient at this point, however,

complained shortness of breath and congested.



PAST MEDICAL HISTORY:  Significant for COPD.



PAST SURGICAL HISTORY:  None known.



ALLERGIES TO MEDICATIONS:  None known.



FAMILY HISTORY:  Unable to obtain.



SOCIAL HISTORY:  Denies history of smoking, possible history of drug abuse,

and history of alcohol abuse.



REVIEW OF SYSTEMS:  Very poor historian.  She said, I do not know, to

everything, and cannot give any reliable history.  At this point is

confused.



PHYSICAL EXAMINATION:

VITAL SIGNS:  Temperature 97.6 degrees, pulse is 93, and blood pressure

123/77.

HEENT:  PERRLA.

NECK:  Supple.

CHEST:  Clear to auscultation.

CARDIOVASCULAR:  Regular rate and rhythm.

GASTROINTESTINAL:  Soft.

EXTREMITIES:  Moves all four extremities.

NEUROLOGICAL:  Does have generalized weakness.  She is on BiPAP.



LABORATORY DATA:  White count is 9.3, hemoglobin 13.8, and platelets

218,000.  Sodium 139, potassium is 3.8, BUN of 24, creatinine of 0.7, and

glucose of 115.  AST of 135 and ALT of 101.  Troponin of 0.016.



ASSESSMENT AND PLAN:  The patient presents with,



1. COPD exacerbation, on BiPAP, and recurrent CO2.

2. Elevated LFTs.



I have asked Dr. Lopez, Dr. Wray, Dr. Ndiaye, and Dr. Elijah Lewis to see the patient.  Antibiotics recommended per Dr. Elijah Lewis

and Dr. Ndiaye will also help to find why the patient has elevated LFTs.

Dr. Lopez will help us with the management of COPD and Dr. Wray

will help us with the elevated fluid management as well as for azotemia.









  ______________________________________________

  Melia Coombs M.D.





DR:  Jose Luis

D:  02/06/2019 21:44

T:  02/07/2019 03:16

JOB#:  [id number]

CC:

## 2019-02-07 NOTE — PULMONOLOGY PROGRESS NOTE
Assessment/Plan


Problems:  


(1) COPD exacerbation


(2) Transaminitis


Assessment/Plan


Problem List:


1. Acute on chronic hypoxemic and hypercapnic respiratory failure


2. COPD w/ acute exacerbation


3. Weakness/fatigue


4. Transaminitis - BETTER





Plan: 


-bipap 12/5 PRN and qHS


-monitor ABG


-Titrate down FiO2 to keep SaO2 > 90%


-prednisone 60 mg (D2)


-Optimize pulmonary hygiene/mobilize as tolerated


-RTC and PRN DUOnebs   


-abx: ceftriaxone/azithro


-f/u cultures   


-monitor mental status


-Aspiration precautions


-DVT Px: Hep SQ





Subjective


Allergies:  


Coded Allergies:  


     No Known Allergies (Unverified , 2/5/19)


Subjective


AFVSS BiPAP -> 3L


Feels better


Less cough less SOB some wheezing no F/C no CP





Objective





Last 24 Hour Vital Signs








  Date Time  Temp Pulse Resp B/P (MAP) Pulse Ox O2 Delivery O2 Flow Rate FiO2


 


2/7/19 16:00      Bi-pap  


 


2/7/19 16:00        32


 


2/7/19 16:00 98.6 102 24 122/76 (91) 98   


 


2/7/19 15:37  81      


 


2/7/19 15:09  97 23  95 Facial  45


 


2/7/19 13:59  101 20  95 Nasal Cannula 3.0 32


 


2/7/19 13:50  103 22  91 Nasal Cannula 3.0 32


 


2/7/19 13:22  100 25  92   


 


2/7/19 12:00      Bi-pap 32.0 


 


2/7/19 12:00       3.0 


 


2/7/19 12:00 98.7 94 20 124/75 (91) 98   


 


2/7/19 11:54  87      


 


2/7/19 11:21  95 20  93   


 


2/7/19 08:50  100 20  94   


 


2/7/19 08:00      Bi-pap 32.0 


 


2/7/19 08:00       3.0 


 


2/7/19 08:00 98.7 90 22 122/76 (91) 100   


 


2/7/19 07:59  87 20  95 Nasal Cannula 5.0 40


 


2/7/19 07:50  94 22  92   


 


2/7/19 07:50  94 22  92 Nasal Cannula 5.0 40


 


2/7/19 07:19  80      


 


2/7/19 04:51  85 16  97 Facial  45


 


2/7/19 04:00      Bi-pap 32.0 


 


2/7/19 04:00        32


 


2/7/19 04:00 98.1 79 19 114/78 (90) 98   


 


2/7/19 03:36  82      


 


2/7/19 03:04  91 19  98 Facial  45


 


2/7/19 01:10  76 16  98 Bi-pap  45


 


2/7/19 01:00  78 16  96 Bi-pap  45


 


2/7/19 00:56  78 16  96 Facial  45


 


2/7/19 00:00 98.6 84 17 118/68 (85) 98   


 


2/7/19 00:00  81      


 


2/7/19 00:00        32


 


2/7/19 00:00      Bi-pap 32.0 


 


2/6/19 22:50  81 16  98 Facial  45


 


2/6/19 21:00      Bi-pap 32.0 


 


2/6/19 21:00        32


 


2/6/19 20:05  88 23  96 Bi-pap  45


 


2/6/19 20:00 97.6 98 18 124/69 (87) 99   


 


2/6/19 20:00  90      


 


2/6/19 19:55  96 19  97 Bi-pap  45


 


2/6/19 19:15  84 19  96 Facial  45

















Intake and Output  


 


 2/6/19 2/7/19





 19:00 07:00


 


Intake Total 472 ml 120 ml


 


Balance 472 ml 120 ml


 


  


 


Intake Oral 472 ml 120 ml


 


# Voids  1








General Appearance:  no acute distress, other - disheveled


HEENT:  normocephalic, atraumatic


Respiratory/Chest:  chest wall non-tender, no respiratory distress, rhonchi - 

scattered


Cardiovascular:  normal peripheral pulses, tachycardia


Abdomen:  normal bowel sounds, soft, non tender, no organomegaly, non distended

, no mass


Extremities:  no cyanosis, no clubbing, no edema





Microbiology








 Date/Time


Source Procedure


Growth Status


 


 


 2/5/19 15:35


Blood Blood Culture - Preliminary


NO GROWTH AFTER 24 HOURS Resulted


 


 2/5/19 15:20


Blood Blood Culture - Preliminary


NO GROWTH AFTER 24 HOURS Resulted


 


 2/5/19 15:20


Nasal Nares Influenza Types A,B Antigen (DANIELA) - Final Complete


 


 2/5/19 17:00


Rectum - Preliminary Resulted








Laboratory Tests


2/6/19 19:00: 


Arterial Blood pH 7.247*L, Arterial Blood Partial Pressure CO2 80.8*H, Arterial 

Blood Partial Pressure O2 152.2H, Arterial Blood HCO3 34.4H, Arterial Blood 

Oxygen Saturation 98.5, Arterial Blood Base Excess 4.4H, Fredy Test Positive


2/7/19 04:00: 


Arterial Blood pH 7.422, Arterial Blood Partial Pressure CO2 59.2*H, Arterial 

Blood Partial Pressure O2 62.8L, Arterial Blood HCO3 37.7H, Arterial Blood 

Oxygen Saturation 92.2L, Arterial Blood Base Excess 11.0*H, Fredy Test Positive


2/7/19 04:10: 


White Blood Count 15.7#H, Red Blood Count 4.24, Hemoglobin 12.3, Hematocrit 37.6

, Mean Corpuscular Volume 89, Mean Corpuscular Hemoglobin 29.1, Mean 

Corpuscular Hemoglobin Concent 32.8, Red Cell Distribution Width 13.3, Platelet 

Count 185, Mean Platelet Volume 6.6, Neutrophils (%) (Auto) , Lymphocytes (%) (

Auto) , Monocytes (%) (Auto) , Eosinophils (%) (Auto) , Basophils (%) (Auto) , 

Differential Total Cells Counted 100, Neutrophils % (Manual) 89H, Lymphocytes % 

(Manual) 10L, Monocytes % (Manual) 1, Eosinophils % (Manual) 0, Basophils % (

Manual) 0, Band Neutrophils 0, Platelet Estimate Adequate, Platelet Morphology 

Normal, Red Blood Cell Morphology Normal, Sodium Level 143, Potassium Level 4.0

, Chloride Level 105, Carbon Dioxide Level 34H, Anion Gap 4L, Blood Urea 

Nitrogen 18, Creatinine 0.7, Estimat Glomerular Filtration Rate > 60, Glucose 

Level 94, Calcium Level 8.9, Total Bilirubin 0.3, Aspartate Amino Transf (AST/

SGOT) 53H, Alanine Aminotransferase (ALT/SGPT) 64, Alkaline Phosphatase 58, 

Total Protein 5.8L, Albumin 2.6L, Globulin 3.2, Albumin/Globulin Ratio 0.8L, 

Hepatitis A IgM Antibody [Pending], Hepatitis B Surface Antigen [Pending], 

Hepatitis B Core IgM Antibody [Pending], Hepatitis C Antibody [Pending]





Current Medications








 Medications


  (Trade)  Dose


 Ordered  Sig/Mona


 Route


 PRN Reason  Start Time


 Stop Time Status Last Admin


Dose Admin


 


 Albuterol/


 Ipratropium


  (Albuterol/


 Ipratropium)  3 ml  Q4H  PRN


 HHN


 Shortness of Breath  2/5/19 20:45


 2/10/19 20:44   


 


 


 Albuterol/


 Ipratropium


  (Albuterol/


 Ipratropium)  3 ml  Q6HRT


 HHN


   2/6/19 19:00


 2/11/19 18:59  2/7/19 13:50


 


 


 Azithromycin


  (Zithromax)  250 mg  DAILY


 ORAL


   2/7/19 09:00


 2/14/19 08:59  2/7/19 08:42


 


 


 Ceftriaxone


 Sodium 1 gm/


 Dextrose  55 ml @ 


 110 mls/hr  Q24H


 IVPB


   2/6/19 14:30


 2/13/19 14:29  2/7/19 15:34


 


 


 Famotidine


  (Pepcid)  20 mg  DAILY


 ORAL


   2/8/19 09:00


 3/10/19 08:59   


 


 


 Ibuprofen


  (Advil)  400 mg  Q6H  PRN


 ORAL


 For Pain  2/5/19 20:45


 3/7/19 20:44   


 

















David Lopez MD Feb 7, 2019 17:22

## 2019-02-07 NOTE — NUR
RESPIRATORY NOTE:

PT STABLE ON BIPAP WITH CURRENT SETTINGS. VENT CIRCUIT AND BIPAP MASK SECURE AND OUT OF THE 
WAY. SPONGE TAPE IN PLACE WITH NO SIGN OF SKIN BREAKDOWN. NO S/S OF RESPIRATORY DISTRESS 
NOTED AT THIS TIME.

## 2019-02-07 NOTE — NUR
RESPIRATORY NOTE:

PT.  CURRENTLY ON 3LPM N/C  PER RN. DANIE KELLY. PT TOLERATING 3LPM N/C WELL AND 
CURRENTLY HAS AN SPO2 OF 94% WITH A HR OF 95BPM. RR IS 20. PT HAS DIMINISHED BREATH SOUNDS 
AND NO S/S OF RESPIRATORY DISTRESS NOTED AT THIS TIME. PT. STATED THAT SHE FEELS WELL OFF 
THE BIPAP. WILL CONTINUE TO MONITOR HER TOLERANCE OFF BIPAP.

## 2019-02-07 NOTE — INFECTIOUS DISEASES PROG NOTE
Assessment/Plan


Assessment/Plan


A:


1. COPD exacerbation,


2. Hypercapnic respiratory failure.


3. Homelessness.


4. Elevated transaminase


5. Leukocytosis


P:


Continue Rocephin & Zithromax


Repeat CXR





Subjective


ROS Limited/Unobtainable:  Yes


Cardiovascular:  Reports: no symptoms


Gastrointestinal/Abdominal:  Reports: no symptoms


Psychiatric:  Reports: other - on restraint


Allergies:  


Coded Allergies:  


     No Known Allergies (Unverified , 2/5/19)





Objective


Vital Signs





Last 24 Hour Vital Signs








  Date Time  Temp Pulse Resp B/P (MAP) Pulse Ox O2 Delivery O2 Flow Rate FiO2


 


2/7/19 11:54  87      


 


2/7/19 11:21  95 20  93   


 


2/7/19 08:50  100 20  94   


 


2/7/19 08:00      Bi-pap 32.0 


 


2/7/19 08:00 98.7 90 22 122/76 (91) 100   


 


2/7/19 07:59  87 20  95 Nasal Cannula 5.0 40


 


2/7/19 07:50  94 22  92   


 


2/7/19 07:50  94 22  92 Nasal Cannula 5.0 40


 


2/7/19 07:19  80      


 


2/7/19 04:51  85 16  97 Facial  45


 


2/7/19 04:00      Bi-pap 32.0 


 


2/7/19 04:00        32


 


2/7/19 04:00 98.1 79 19 114/78 (90) 98   


 


2/7/19 03:36  82      


 


2/7/19 03:04  91 19  98 Facial  45


 


2/7/19 01:10  76 16  98 Bi-pap  45


 


2/7/19 01:00  78 16  96 Bi-pap  45


 


2/7/19 00:56  78 16  96 Facial  45


 


2/7/19 00:00 98.6 84 17 118/68 (85) 98   


 


2/7/19 00:00  81      


 


2/7/19 00:00        32


 


2/7/19 00:00      Bi-pap 32.0 


 


2/6/19 22:50  81 16  98 Facial  45


 


2/6/19 21:00      Bi-pap 32.0 


 


2/6/19 21:00        32


 


2/6/19 20:05  88 23  96 Bi-pap  45


 


2/6/19 20:00 97.6 98 18 124/69 (87) 99   


 


2/6/19 20:00  90      


 


2/6/19 19:55  96 19  97 Bi-pap  45


 


2/6/19 19:15  84 19  96 Facial  45


 


2/6/19 17:02  86 31  94 Facial  45


 


2/6/19 16:00 97.6 93 19 123/77 (92) 97   


 


2/6/19 16:00  93      


 


2/6/19 15:29  88 28  94 Facial  40








Height (Feet):  5


Height (Inches):  5.00


Weight (Pounds):  123


HEENT:  other - R subconjunctival hemorrhage


Respiratory/Chest:  rhonchi - bilaterally


Cardiovascular:  normal rate


Abdomen:  soft, non tender


Extremities:  no edema


Neurologic/Psychiatric:  alert, responsive





Microbiology








 Date/Time


Source Procedure


Growth Status


 


 


 2/5/19 15:35


Blood Blood Culture - Preliminary


NO GROWTH AFTER 24 HOURS Resulted


 


 2/5/19 15:20


Blood Blood Culture - Preliminary


NO GROWTH AFTER 24 HOURS Resulted


 


 2/5/19 15:20


Nasal Nares Influenza Types A,B Antigen (DANIELA) - Final Complete


 


 2/5/19 17:00


Rectum - Preliminary Resulted











Laboratory Tests








Test


  2/6/19


17:14 2/6/19


19:00 2/7/19


04:10


 


Arterial Blood pH


  7.232


(7.350-7.450) 7.247


(7.350-7.450) 


 


 


Arterial Blood Partial


Pressure CO2 83.4 mmHg


(35.0-45.0)  *H 80.8 mmHg


(35.0-45.0)  *H 


 


 


Arterial Blood Partial


Pressure O2 94.5 mmHg


(75.0-100.0) 152.2 mmHg


(75.0-100.0)  H 


 


 


Arterial Blood HCO3


  34.3 mmol/L


(22.0-26.0)  H 34.4 mmol/L


(22.0-26.0)  H 


 


 


Arterial Blood Oxygen


Saturation 95.9 %


() 98.5 %


() 


 


 


Arterial Blood Base Excess 3.9 (-2-2)  H 4.4 (-2-2)  H 


 


Fredy Test Positive   Positive   


 


White Blood Count


  


  


  15.7 K/UL


(4.8-10.8)  #H


 


Red Blood Count


  


  


  4.24 M/UL


(4.20-5.40)


 


Hemoglobin


  


  


  12.3 G/DL


(12.0-16.0)


 


Hematocrit


  


  


  37.6 %


(37.0-47.0)


 


Mean Corpuscular Volume   89 FL (80-99)  


 


Mean Corpuscular Hemoglobin


  


  


  29.1 PG


(27.0-31.0)


 


Mean Corpuscular Hemoglobin


Concent 


  


  32.8 G/DL


(32.0-36.0)


 


Red Cell Distribution Width


  


  


  13.3 %


(11.6-14.8)


 


Platelet Count


  


  


  185 K/UL


(150-450)


 


Mean Platelet Volume


  


  


  6.6 FL


(6.5-10.1)


 


Neutrophils (%) (Auto)


  


  


  % (45.0-75.0)


 


 


Lymphocytes (%) (Auto)


  


  


  % (20.0-45.0)


 


 


Monocytes (%) (Auto)    % (1.0-10.0)  


 


Eosinophils (%) (Auto)    % (0.0-3.0)  


 


Basophils (%) (Auto)    % (0.0-2.0)  


 


Differential Total Cells


Counted 


  


  100  


 


 


Neutrophils % (Manual)   89 % (45-75)  H


 


Lymphocytes % (Manual)   10 % (20-45)  L


 


Monocytes % (Manual)   1 % (1-10)  


 


Eosinophils % (Manual)   0 % (0-3)  


 


Basophils % (Manual)   0 % (0-2)  


 


Band Neutrophils   0 % (0-8)  


 


Platelet Estimate   Adequate  


 


Platelet Morphology   Normal  


 


Red Blood Cell Morphology   Normal  


 


Sodium Level


  


  


  143 MMOL/L


(136-145)


 


Potassium Level


  


  


  4.0 MMOL/L


(3.5-5.1)


 


Chloride Level


  


  


  105 MMOL/L


()


 


Carbon Dioxide Level


  


  


  34 MMOL/L


(21-32)  H


 


Anion Gap


  


  


  4 mmol/L


(5-15)  L


 


Blood Urea Nitrogen


  


  


  18 mg/dL


(7-18)


 


Creatinine


  


  


  0.7 MG/DL


(0.55-1.30)


 


Estimat Glomerular Filtration


Rate 


  


  > 60 mL/min


(>60)


 


Glucose Level


  


  


  94 MG/DL


()


 


Calcium Level


  


  


  8.9 MG/DL


(8.5-10.1)


 


Total Bilirubin


  


  


  0.3 MG/DL


(0.2-1.0)


 


Aspartate Amino Transf


(AST/SGOT) 


  


  53 U/L (15-37)


H


 


Alanine Aminotransferase


(ALT/SGPT) 


  


  64 U/L (12-78)


 


 


Alkaline Phosphatase


  


  


  58 U/L


()


 


Total Protein


  


  


  5.8 G/DL


(6.4-8.2)  L


 


Albumin


  


  


  2.6 G/DL


(3.4-5.0)  L


 


Globulin   3.2 g/dL  


 


Albumin/Globulin Ratio


  


  


  0.8 (1.0-2.7)


L


 


Hepatitis A IgM Antibody   Pending  


 


Hepatitis B Surface Antigen   Pending  


 


Hepatitis B Core IgM Antibody   Pending  


 


Hepatitis C Antibody   Pending  











Current Medications








 Medications


  (Trade)  Dose


 Ordered  Sig/Mona


 Route


 PRN Reason  Start Time


 Stop Time Status Last Admin


Dose Admin


 


 Albuterol/


 Ipratropium


  (Albuterol/


 Ipratropium)  3 ml  Q4H  PRN


 HHN


 Shortness of Breath  2/5/19 20:45


 2/10/19 20:44   


 


 


 Albuterol/


 Ipratropium


  (Albuterol/


 Ipratropium)  3 ml  Q6HRT


 HHN


   2/6/19 19:00


 2/11/19 18:59  2/7/19 08:00


 


 


 Azithromycin


  (Zithromax)  250 mg  DAILY


 ORAL


   2/7/19 09:00


 2/14/19 08:59  2/7/19 08:42


 


 


 Ceftriaxone


 Sodium 1 gm/


 Dextrose  55 ml @ 


 110 mls/hr  Q24H


 IVPB


   2/6/19 14:30


 2/13/19 14:29  2/6/19 14:58


 


 


 Famotidine


  (Pepcid)  20 mg  DAILY


 ORAL


   2/8/19 09:00


 3/10/19 08:59   


 


 


 Ibuprofen


  (Advil)  400 mg  Q6H  PRN


 ORAL


 For Pain  2/5/19 20:45


 3/7/19 20:44   


 

















Elijah Lewis MD Feb 7, 2019 13:03

## 2019-02-07 NOTE — NUR
NURSE NOTES:

pt noted coughing when eating .It was noted too during breakfast. WILL  call to Dr Alexandre.

## 2019-02-07 NOTE — NUR
NURSE NOTES:

Received report from Shanti ARAUZ RN.  Patient seen in bed in semi-monroe position with bipap 
on with setting of 18/5, 32%, Spo2 98%.  Alert, responsive, no S/S of pain present at this 
time via FLACC.  Bilateral wrist restraint present, no redness, swelling or skin breakdown 
to bilateral wrist.  Noted with purewick, but patient is constantly moving around and it is 
not in place.  Fixed as needed, will make routine rounds and change linens as needed.  IV 
site remains to right AC 20g and is intact. Bed is in lowest position. Call light is within 
reach with wrist restraint on. Will continue to monitor.

## 2019-02-07 NOTE — NUR
NURSE NOTES:

Dr Coombs return call ordered to do Swallow eval and keep pt NPO pending Swallow eval. Pt 
placed back in Bipap due to resp distress.

## 2019-02-07 NOTE — GENERAL PROGRESS NOTE
Assessment/Plan


Problem List:  


(1) COPD exacerbation


ICD Codes:  J44.1 - Chronic obstructive pulmonary disease with (acute) 

exacerbation


SNOMED:  609916713


(2) Transaminitis


ICD Codes:  R74.0 - Nonspecific elevation of levels of transaminase and lactic 

acid dehydrogenase [LDH]


SNOMED:  688494689, 526206917


Status:  progressing


Assessment/Plan


afebrile


elev lft


copd exacerbation


agitated


still wheezing at times


non compliant





Subjective


Respiratory:  Reports: shortness of breath


Allergies:  


Coded Allergies:  


     No Known Allergies (Unverified , 2/5/19)





Objective





Last 24 Hour Vital Signs








  Date Time  Temp Pulse Resp B/P (MAP) Pulse Ox O2 Delivery O2 Flow Rate FiO2


 


2/7/19 21:00       3.0 


 


2/7/19 20:47  79 23  98 Facial  45


 


2/7/19 20:09  98 22  98 Bi-pap  45


 


2/7/19 20:02  96 23  97 Bi-pap  45


 


2/7/19 20:02  81 24  98 Facial  45


 


2/7/19 20:00 97.9 88 20 127/75 (92) 99   


 


2/7/19 20:00        32


 


2/7/19 20:00      Bi-pap  


 


2/7/19 19:29  81      


 


2/7/19 17:18  83 28  99 Facial  45


 


2/7/19 16:00      Bi-pap  


 


2/7/19 16:00        32


 


2/7/19 16:00 98.6 102 24 122/76 (91) 98   


 


2/7/19 15:37  81      


 


2/7/19 15:09  97 23  95 Facial  45


 


2/7/19 13:59  101 20  95 Nasal Cannula 3.0 32


 


2/7/19 13:50  103 22  91 Nasal Cannula 3.0 32


 


2/7/19 13:22  100 25  92   


 


2/7/19 12:00      Bi-pap 32.0 


 


2/7/19 12:00       3.0 


 


2/7/19 12:00 98.7 94 20 124/75 (91) 98   


 


2/7/19 11:54  87      


 


2/7/19 11:21  95 20  93   


 


2/7/19 08:50  100 20  94   


 


2/7/19 08:00      Bi-pap 32.0 


 


2/7/19 08:00       3.0 


 


2/7/19 08:00 98.7 90 22 122/76 (91) 100   


 


2/7/19 07:59  87 20  95 Nasal Cannula 5.0 40


 


2/7/19 07:50  94 22  92   


 


2/7/19 07:50  94 22  92 Nasal Cannula 5.0 40


 


2/7/19 07:19  80      


 


2/7/19 04:51  85 16  97 Facial  45


 


2/7/19 04:00      Bi-pap 32.0 


 


2/7/19 04:00        32


 


2/7/19 04:00 98.1 79 19 114/78 (90) 98   


 


2/7/19 03:36  82      


 


2/7/19 03:04  91 19  98 Facial  45


 


2/7/19 01:10  76 16  98 Bi-pap  45


 


2/7/19 01:00  78 16  96 Bi-pap  45


 


2/7/19 00:56  78 16  96 Facial  45


 


2/7/19 00:00 98.6 84 17 118/68 (85) 98   


 


2/7/19 00:00  81      


 


2/7/19 00:00        32


 


2/7/19 00:00      Bi-pap 32.0 


 


2/6/19 22:50  81 16  98 Facial  45

















Intake and Output  


 


 2/6/19 2/7/19





 19:00 07:00


 


Intake Total 472 ml 120 ml


 


Balance 472 ml 120 ml


 


  


 


Intake Oral 472 ml 120 ml


 


# Voids  1








Laboratory Tests


2/7/19 04:00: 


Arterial Blood pH 7.422, Arterial Blood Partial Pressure CO2 59.2*H, Arterial 

Blood Partial Pressure O2 62.8L, Arterial Blood HCO3 37.7H, Arterial Blood 

Oxygen Saturation 92.2L, Arterial Blood Base Excess 11.0*H, Fredy Test Positive


2/7/19 04:10: 


White Blood Count 15.7#H, Red Blood Count 4.24, Hemoglobin 12.3, Hematocrit 37.6

, Mean Corpuscular Volume 89, Mean Corpuscular Hemoglobin 29.1, Mean 

Corpuscular Hemoglobin Concent 32.8, Red Cell Distribution Width 13.3, Platelet 

Count 185, Mean Platelet Volume 6.6, Neutrophils (%) (Auto) , Lymphocytes (%) (

Auto) , Monocytes (%) (Auto) , Eosinophils (%) (Auto) , Basophils (%) (Auto) , 

Differential Total Cells Counted 100, Neutrophils % (Manual) 89H, Lymphocytes % 

(Manual) 10L, Monocytes % (Manual) 1, Eosinophils % (Manual) 0, Basophils % (

Manual) 0, Band Neutrophils 0, Platelet Estimate Adequate, Platelet Morphology 

Normal, Red Blood Cell Morphology Normal, Sodium Level 143, Potassium Level 4.0

, Chloride Level 105, Carbon Dioxide Level 34H, Anion Gap 4L, Blood Urea 

Nitrogen 18, Creatinine 0.7, Estimat Glomerular Filtration Rate > 60, Glucose 

Level 94, Calcium Level 8.9, Total Bilirubin 0.3, Aspartate Amino Transf (AST/

SGOT) 53H, Alanine Aminotransferase (ALT/SGPT) 64, Alkaline Phosphatase 58, 

Total Protein 5.8L, Albumin 2.6L, Globulin 3.2, Albumin/Globulin Ratio 0.8L, 

Hepatitis A IgM Antibody [Pending], Hepatitis B Surface Antigen [Pending], 

Hepatitis B Core IgM Antibody [Pending], Hepatitis C Antibody [Pending]


Height (Feet):  5


Height (Inches):  5.00


Weight (Pounds):  123


Cardiovascular:  normal rate


Respiratory/Chest:  lungs clear


Abdomen:  soft











Melia Coombs MD Feb 7, 2019 21:58

## 2019-02-07 NOTE — NUR
NURSE NOTES:

Report received from Minnie Barrow RN.Pt resting quietly in bed asleep noted no resp distress,on 
2L NC ,no signs of pain or discomfort,SR on  the monitor,GTF Nepro at 40 ml/hr,no residual 
noted,pt on purewick,to BSD draining yellow urine,pt hypothermic on Kayla hugger,skin warm 
and dry ,JEANINE PICC line noted leaking from site,IVF turned off ,will call DR Daniels to 
inform him,of the leaking PICC line.SR up x2 HOB elevated,bed lock in lowest position ,will 
continue with plans of care.

## 2019-02-07 NOTE — NUR
Social Service Note



SW attempted to meet with patient and obtained information.  Patient confused and unable to 
answer questions appropriately.  SW went through patient's belongings.  Patient's clothes 
doesn't appear as though she has been living on the streets for a period of time.  No 
identification or indicating paperwork.  SW contacted missing person's [phone] and 
patient is not listed as missing at this time.  SW completing skip trace and located 
possible family but no numbers where SW could leave a message.  Will continue to monitor and 
follow up.

## 2019-02-07 NOTE — GI PROGRESS NOTE
Assessment/Plan


Problems:  


(1) Transaminitis


ICD Codes:  R74.0 - Nonspecific elevation of levels of transaminase and lactic 

acid dehydrogenase [LDH]


SNOMED:  993410684, [id number]


(2) COPD exacerbation


ICD Codes:  J44.1 - Chronic obstructive pulmonary disease with (acute) 

exacerbation


SNOMED:  020254766


Status:  stable, progressing


Status Narrative


Discussed with Dr. Ndiaye


Assessment/Plan


Transaminitis resolving


Patient now off BiPAP


Hepatitis panel pending





Symptomatic treatment


okay to advance to regular diet


Zofran as needed


ppi


fu labs


Outpatient GI procedures





The patient was seen and examined at bedside and all new and available data was 

reviewed in the patients chart. I agree with the above findings, impression 

and plan.  (Patient seen earlier today. Signature stamp does not reflect 

patient encounter time.). - Juan Miguel Ndiaye MD





Subjective


Subjective


Denies any abdominal pain


Denies any nausea or vomiting


Denies any constipation or diarrhea





Objective





Last 24 Hour Vital Signs








  Date Time  Temp Pulse Resp B/P (MAP) Pulse Ox O2 Delivery O2 Flow Rate FiO2


 


2/7/19 08:50  100 20  94   


 


2/7/19 08:00      Bi-pap 32.0 


 


2/7/19 08:00 98.7 90 22 122/76 (91) 100   


 


2/7/19 07:59  87 20  95 Nasal Cannula 5.0 40


 


2/7/19 07:50  94 22  92   


 


2/7/19 07:50  94 22  92 Nasal Cannula 5.0 40


 


2/7/19 07:19  80      


 


2/7/19 04:51  85 16  97 Facial  45


 


2/7/19 04:00      Bi-pap 32.0 


 


2/7/19 04:00        32


 


2/7/19 04:00 98.1 79 19 114/78 (90) 98   


 


2/7/19 03:36  82      


 


2/7/19 03:04  91 19  98 Facial  45


 


2/7/19 01:10  76 16  98 Bi-pap  45


 


2/7/19 01:00  78 16  96 Bi-pap  45


 


2/7/19 00:56  78 16  96 Facial  45


 


2/7/19 00:00 98.6 84 17 118/68 (85) 98   


 


2/7/19 00:00  81      


 


2/7/19 00:00        32


 


2/7/19 00:00      Bi-pap 32.0 


 


2/6/19 22:50  81 16  98 Facial  45


 


2/6/19 21:00      Bi-pap 32.0 


 


2/6/19 21:00        32


 


2/6/19 20:05  88 23  96 Bi-pap  45


 


2/6/19 20:00 97.6 98 18 124/69 (87) 99   


 


2/6/19 20:00  90      


 


2/6/19 19:55  96 19  97 Bi-pap  45


 


2/6/19 19:15  84 19  96 Facial  45


 


2/6/19 17:02  86 31  94 Facial  45


 


2/6/19 16:00 97.6 93 19 123/77 (92) 97   


 


2/6/19 16:00  93      


 


2/6/19 15:29  88 28  94 Facial  40


 


2/6/19 12:00  101      


 


2/6/19 12:00 98.6 103 18 114/69 (84) 98   

















Intake and Output  


 


 2/6/19 2/7/19





 19:00 07:00


 


Intake Total 472 ml 120 ml


 


Balance 472 ml 120 ml


 


  


 


Intake Oral 472 ml 120 ml


 


# Voids  1











Laboratory Tests








Test


  2/6/19


17:14 2/6/19


19:00 2/7/19


04:10


 


Arterial Blood pH


  7.232


(7.350-7.450) 7.247


(7.350-7.450) 


 


 


Arterial Blood Partial


Pressure CO2 83.4 mmHg


(35.0-45.0)  *H 80.8 mmHg


(35.0-45.0)  *H 


 


 


Arterial Blood Partial


Pressure O2 94.5 mmHg


(75.0-100.0) 152.2 mmHg


(75.0-100.0)  H 


 


 


Arterial Blood HCO3


  34.3 mmol/L


(22.0-26.0)  H 34.4 mmol/L


(22.0-26.0)  H 


 


 


Arterial Blood Oxygen


Saturation 95.9 %


() 98.5 %


() 


 


 


Arterial Blood Base Excess 3.9 (-2-2)  H 4.4 (-2-2)  H 


 


Fredy Test Positive   Positive   


 


White Blood Count


  


  


  15.7 K/UL


(4.8-10.8)  #H


 


Red Blood Count


  


  


  4.24 M/UL


(4.20-5.40)


 


Hemoglobin


  


  


  12.3 G/DL


(12.0-16.0)


 


Hematocrit


  


  


  37.6 %


(37.0-47.0)


 


Mean Corpuscular Volume   89 FL (80-99)  


 


Mean Corpuscular Hemoglobin


  


  


  29.1 PG


(27.0-31.0)


 


Mean Corpuscular Hemoglobin


Concent 


  


  32.8 G/DL


(32.0-36.0)


 


Red Cell Distribution Width


  


  


  13.3 %


(11.6-14.8)


 


Platelet Count


  


  


  185 K/UL


(150-450)


 


Mean Platelet Volume


  


  


  6.6 FL


(6.5-10.1)


 


Neutrophils (%) (Auto)


  


  


  % (45.0-75.0)


 


 


Lymphocytes (%) (Auto)


  


  


  % (20.0-45.0)


 


 


Monocytes (%) (Auto)    % (1.0-10.0)  


 


Eosinophils (%) (Auto)    % (0.0-3.0)  


 


Basophils (%) (Auto)    % (0.0-2.0)  


 


Differential Total Cells


Counted 


  


  100  


 


 


Neutrophils % (Manual)   89 % (45-75)  H


 


Lymphocytes % (Manual)   10 % (20-45)  L


 


Monocytes % (Manual)   1 % (1-10)  


 


Eosinophils % (Manual)   0 % (0-3)  


 


Basophils % (Manual)   0 % (0-2)  


 


Band Neutrophils   0 % (0-8)  


 


Platelet Estimate   Adequate  


 


Platelet Morphology   Normal  


 


Red Blood Cell Morphology   Normal  


 


Sodium Level


  


  


  143 MMOL/L


(136-145)


 


Potassium Level


  


  


  4.0 MMOL/L


(3.5-5.1)


 


Chloride Level


  


  


  105 MMOL/L


()


 


Carbon Dioxide Level


  


  


  34 MMOL/L


(21-32)  H


 


Anion Gap


  


  


  4 mmol/L


(5-15)  L


 


Blood Urea Nitrogen


  


  


  18 mg/dL


(7-18)


 


Creatinine


  


  


  0.7 MG/DL


(0.55-1.30)


 


Estimat Glomerular Filtration


Rate 


  


  > 60 mL/min


(>60)


 


Glucose Level


  


  


  94 MG/DL


()


 


Calcium Level


  


  


  8.9 MG/DL


(8.5-10.1)


 


Total Bilirubin


  


  


  0.3 MG/DL


(0.2-1.0)


 


Aspartate Amino Transf


(AST/SGOT) 


  


  53 U/L (15-37)


H


 


Alanine Aminotransferase


(ALT/SGPT) 


  


  64 U/L (12-78)


 


 


Alkaline Phosphatase


  


  


  58 U/L


()


 


Total Protein


  


  


  5.8 G/DL


(6.4-8.2)  L


 


Albumin


  


  


  2.6 G/DL


(3.4-5.0)  L


 


Globulin   3.2 g/dL  


 


Albumin/Globulin Ratio


  


  


  0.8 (1.0-2.7)


L


 


Hepatitis A IgM Antibody   Pending  


 


Hepatitis B Surface Antigen   Pending  


 


Hepatitis B Core IgM Antibody   Pending  


 


Hepatitis C Antibody   Pending  








Height (Feet):  5


Height (Inches):  5.00


Weight (Pounds):  123


General Appearance:  WD/WN, no apparent distress, alert


Cardiovascular:  normal rate


Respiratory/Chest:  normal breath sounds, no respiratory distress


Abdominal Exam:  normal bowel sounds, non tender, soft


Extremities:  normal range of motion, non-tender











Stacy Hernandez NP Feb 7, 2019 11:28

## 2019-02-08 VITALS — DIASTOLIC BLOOD PRESSURE: 83 MMHG | SYSTOLIC BLOOD PRESSURE: 134 MMHG

## 2019-02-08 VITALS — DIASTOLIC BLOOD PRESSURE: 68 MMHG | SYSTOLIC BLOOD PRESSURE: 130 MMHG

## 2019-02-08 VITALS — DIASTOLIC BLOOD PRESSURE: 88 MMHG | SYSTOLIC BLOOD PRESSURE: 139 MMHG

## 2019-02-08 VITALS — DIASTOLIC BLOOD PRESSURE: 63 MMHG | SYSTOLIC BLOOD PRESSURE: 124 MMHG

## 2019-02-08 VITALS — DIASTOLIC BLOOD PRESSURE: 76 MMHG | SYSTOLIC BLOOD PRESSURE: 120 MMHG

## 2019-02-08 VITALS — SYSTOLIC BLOOD PRESSURE: 105 MMHG | DIASTOLIC BLOOD PRESSURE: 90 MMHG

## 2019-02-08 LAB
ADD MANUAL DIFF: NO
ALBUMIN SERPL-MCNC: 2.8 G/DL (ref 3.4–5)
ALBUMIN/GLOB SERPL: 0.8 {RATIO} (ref 1–2.7)
ALP SERPL-CCNC: 59 U/L (ref 46–116)
ALT SERPL-CCNC: 58 U/L (ref 12–78)
ANION GAP SERPL CALC-SCNC: 6 MMOL/L (ref 5–15)
AST SERPL-CCNC: 48 U/L (ref 15–37)
BASOPHILS NFR BLD AUTO: 0.5 % (ref 0–2)
BILIRUB SERPL-MCNC: 0.6 MG/DL (ref 0.2–1)
BUN SERPL-MCNC: 17 MG/DL (ref 7–18)
CALCIUM SERPL-MCNC: 9.1 MG/DL (ref 8.5–10.1)
CHLORIDE SERPL-SCNC: 104 MMOL/L (ref 98–107)
CO2 SERPL-SCNC: 32 MMOL/L (ref 21–32)
CREAT SERPL-MCNC: 0.7 MG/DL (ref 0.55–1.3)
EOSINOPHIL NFR BLD AUTO: 0.9 % (ref 0–3)
ERYTHROCYTE [DISTWIDTH] IN BLOOD BY AUTOMATED COUNT: 13.7 % (ref 11.6–14.8)
GLOBULIN SER-MCNC: 3.5 G/DL
HCT VFR BLD CALC: 39 % (ref 37–47)
HGB BLD-MCNC: 12.8 G/DL (ref 12–16)
LYMPHOCYTES NFR BLD AUTO: 17 % (ref 20–45)
MCV RBC AUTO: 88 FL (ref 80–99)
MONOCYTES NFR BLD AUTO: 7.7 % (ref 1–10)
NEUTROPHILS NFR BLD AUTO: 73.9 % (ref 45–75)
PLATELET # BLD: 179 K/UL (ref 150–450)
POTASSIUM SERPL-SCNC: 4.1 MMOL/L (ref 3.5–5.1)
RBC # BLD AUTO: 4.43 M/UL (ref 4.2–5.4)
SODIUM SERPL-SCNC: 142 MMOL/L (ref 136–145)
WBC # BLD AUTO: 9.4 K/UL (ref 4.8–10.8)

## 2019-02-08 RX ADMIN — HEPARIN SODIUM SCH UNITS: 5000 INJECTION INTRAVENOUS; SUBCUTANEOUS at 20:18

## 2019-02-08 RX ADMIN — IPRATROPIUM BROMIDE AND ALBUTEROL SULFATE SCH ML: .5; 3 SOLUTION RESPIRATORY (INHALATION) at 08:08

## 2019-02-08 RX ADMIN — IPRATROPIUM BROMIDE AND ALBUTEROL SULFATE SCH ML: .5; 3 SOLUTION RESPIRATORY (INHALATION) at 00:47

## 2019-02-08 RX ADMIN — AZITHROMYCIN DIHYDRATE SCH MG: 250 TABLET, FILM COATED ORAL at 08:01

## 2019-02-08 RX ADMIN — IPRATROPIUM BROMIDE AND ALBUTEROL SULFATE SCH ML: .5; 3 SOLUTION RESPIRATORY (INHALATION) at 13:38

## 2019-02-08 RX ADMIN — SODIUM CHLORIDE SCH MLS/HR: 0.9 INJECTION INTRAVENOUS at 13:54

## 2019-02-08 RX ADMIN — IPRATROPIUM BROMIDE AND ALBUTEROL SULFATE SCH ML: .5; 3 SOLUTION RESPIRATORY (INHALATION) at 18:59

## 2019-02-08 RX ADMIN — HEPARIN SODIUM SCH UNITS: 5000 INJECTION INTRAVENOUS; SUBCUTANEOUS at 08:01

## 2019-02-08 NOTE — NUR
NURSE NOTES:

Received repot from Horacio RN, pt. in bed awake, AO x's2- able to make needs known, pt. 
appears to be tolerating current BIPAP settings at 12/5 Fio2 at 32%- no respiratory distress 
noted, call light within easy reach, bed alarm on, side rails on x's3- safety brakes 
engaged, pt. appears to be resting comfortably, pt. is clean and dry, LFA 22G IV intact and 
patent, safety measures continued, will continue with plan of care.

-------------------------------------------------------------------------------

Addendum: 02/08/19 at 1955 by HEIKE BETH RN RN

-------------------------------------------------------------------------------

no signs or symptoms of acute cardiac or respiratory distress noted.

## 2019-02-08 NOTE — PULMONOLOGY PROGRESS NOTE
Assessment/Plan


Assessment/Plan


Progress Note date and time: 02/07/19 1722


Assessment/Plan


Problems:  


(1) COPD exacerbation


(2) Transaminitis


Assessment/Plan


Problem List:


1. Acute on chronic hypoxemic and hypercapnic respiratory failure


2. COPD w/ acute exacerbation


3. Weakness/fatigue


4. Transaminitis - BETTER





Plan: 


-bipap 12/5 PRN and qHS


-monitor ABG


-Titrate down FiO2 to keep SaO2 > 90%


-prednisone 60 mg (D2)


-Optimize pulmonary hygiene/mobilize as tolerated


-RTC and PRN DUOnebs   


-abx: ceftriaxone/azithro


-f/u cultures   


-monitor mental status


-Aspiration precautions


-DVT Px: Hep SQ





Subjective


Allergies:  


Coded Allergies:  


     No Known Allergies (Unverified , 2/5/19)


Subjective


AFVSS BiPAP -> 3L


Feels better


Less cough less SOB some wheezing no F/C no CP


CXR possible infiltrate RUL





Objective





Vital Signs Noted





General Appearance:  no acute distress, other - disheveled


HEENT:  normocephalic, atraumatic


Respiratory/Chest:  chest wall non-tender, no respiratory distress, rhonchi - 

scattered


Cardiovascular:  normal peripheral pulses, tachycardia


Abdomen:  normal bowel sounds, soft, non tender, no organomegaly, non distended

, no mass


Extremities:  no cyanosis, no clubbing, no edema





Microbiology








 Date/Time


Source Procedure


Growth Status


 


 


 2/5/19 15:35


Blood Blood Culture - Preliminary


NO GROWTH AFTER 24 HOURS Resulted


 


 2/5/19 15:20


Blood Blood Culture - Preliminary


NO GROWTH AFTER 24 HOURS Resulted


 


 2/5/19 15:20


Nasal Nares Influenza Types A,B Antigen (DANIELA) - Final Complete


 


 2/5/19 17:00


Rectum - Preliminary Resulted








Laboratory Tests


2/6/19 19:00: 


Arterial Blood pH 7.247*L, Arterial Blood Partial Pressure CO2 80.8*H, Arterial 

Blood Partial Pressure O2 152.2H, Arterial Blood HCO3 34.4H, Arterial Blood 

Oxygen Saturation 98.5, Arterial Blood Base Excess 4.4H, Fredy Test Positive


2/7/19 04:00: 


Arterial Blood pH 7.422, Arterial Blood Partial Pressure CO2 59.2*H, Arterial 

Blood Partial Pressure O2 62.8L, Arterial Blood HCO3 37.7H, Arterial Blood 

Oxygen Saturation 92.2L, Arterial Blood Base Excess 11.0*H, Fredy Test Positive


2/7/19 04:10: 


White Blood Count 15.7#H, Red Blood Count 4.24, Hemoglobin 12.3, Hematocrit 37.6

, Mean Corpuscular Volume 89, Mean Corpuscular Hemoglobin 29.1, Mean 

Corpuscular Hemoglobin Concent 32.8, Red Cell Distribution Width 13.3, Platelet 

Count 185, Mean Platelet Volume 6.6, Neutrophils (%) (Auto) , Lymphocytes (%) (

Auto) , Monocytes (%) (Auto) , Eosinophils (%) (Auto) , Basophils (%) (Auto) , 

Differential Total Cells Counted 100, Neutrophils % (Manual) 89H, Lymphocytes % 

(Manual) 10L, Monocytes % (Manual) 1, Eosinophils % (Manual) 0, Basophils % (

Manual) 0, Band Neutrophils 0, Platelet Estimate Adequate, Platelet Morphology 

Normal, Red Blood Cell Morphology Normal, Sodium Level 143, Potassium Level 4.0

, Chloride Level 105, Carbon Dioxide Level 34H, Anion Gap 4L, Blood Urea 

Nitrogen 18, Creatinine 0.7, Estimat Glomerular Filtration Rate > 60, Glucose 

Level 94, Calcium Level 8.9, Total Bilirubin 0.3, Aspartate Amino Transf (AST/

SGOT) 53H, Alanine Aminotransferase (ALT/SGPT) 64, Alkaline Phosphatase 58, 

Total Protein 5.8L, Albumin 2.6L, Globulin 3.2, Albumin/Globulin Ratio 0.8L, 

Hepatitis A IgM Antibody [Pending], Hepatitis B Surface Antigen [Pending], 

Hepatitis B Core IgM Antibody [Pending], Hepatitis C Antibody [Pending]





Current Medications








 Medications


  (Trade)  Dose


 Ordered  Sig/Mona


 Route


 PRN Reason  Start Time


 Stop Time Status Last Admin


Dose Admin


 


 Albuterol/


 Ipratropium


  (Albuterol/


 Ipratropium)  3 ml  Q4H  PRN


 HHN


 Shortness of Breath  2/5/19 20:45


 2/10/19 20:44   


 


 


 Albuterol/


 Ipratropium


  (Albuterol/


 Ipratropium)  3 ml  Q6HRT


 HHN


   2/6/19 19:00


 2/11/19 18:59  2/7/19 13:50


 


 


 Azithromycin


  (Zithromax)  250 mg  DAILY


 ORAL


   2/7/19 09:00


 2/14/19 08:59  2/7/19 08:42


 


 


 Ceftriaxone


 Sodium 1 gm/


 Dextrose  55 ml @ 


 110 mls/hr  Q24H


 IVPB


   2/6/19 14:30


 2/13/19 14:29  2/7/19 15:34


 


 


 Famotidine


  (Pepcid)  20 mg  DAILY


 ORAL


   2/8/19 09:00


 3/10/19 08:59   


 


 


 Ibuprofen


  (Advil)  400 mg  Q6H  PRN


 ORAL


 For Pain  2/5/19 20:45


 3/7/19 20:44   


 











Subjective


ROS Limited/Unobtainable:  No


Allergies:  


Coded Allergies:  


     No Known Allergies (Unverified , 2/5/19)





Objective





Last 24 Hour Vital Signs








  Date Time  Temp Pulse Resp B/P (MAP) Pulse Ox O2 Delivery O2 Flow Rate FiO2


 


2/8/19 19:18       3.0 


 


2/8/19 19:07  92 20  98 Nasal Cannula 2.0 28


 


2/8/19 18:58  88 20  94 Nasal Cannula 2.0 28


 


2/8/19 17:12  93 20  93   


 


2/8/19 16:01  78      


 


2/8/19 16:00      Bi-pap  


 


2/8/19 16:00       2.0 


 


2/8/19 16:00 98.4 85 20 120/76 (91) 96   


 


2/8/19 15:15  82 22  95 Facial  45


 


2/8/19 13:46  100 20  99 Venturi Mask 14.0 55


 


2/8/19 13:34  100 20  93 Nasal Cannula 3.0 32


 


2/8/19 13:10  90 22  93   


 


2/8/19 12:00      Bi-pap  


 


2/8/19 12:00  96      


 


2/8/19 12:00 97.8 87 18 130/68 (88) 99   


 


2/8/19 12:00        32


 


2/8/19 11:20  77 22  99   


 


2/8/19 09:16  89 20  98   


 


2/8/19 08:00        32


 


2/8/19 08:00  88      


 


2/8/19 08:00 97.9 108 16 105/90 (95) 99   


 


2/8/19 08:00      Bi-pap  


 


2/8/19 07:59  96 22  98 Venturi Mask 14.0 55


 


2/8/19 07:50  83 20  98 Venturi Mask 14.0 55


 


2/8/19 07:50  83 20  98   


 


2/8/19 04:00 98.8 86 24 139/88 (105) 98   


 


2/8/19 04:00        32


 


2/8/19 04:00      Bi-pap  


 


2/8/19 03:34  71      


 


2/8/19 02:41  78 25  98 Facial  45


 


2/8/19 00:54  88 24  99 Bi-pap  45


 


2/8/19 00:47  84 25  98 Bi-pap  45


 


2/8/19 00:47  84 24  98 Facial  45


 


2/8/19 00:00      Bi-pap  


 


2/8/19 00:00 97.9 91 20 134/83 (100) 95   


 


2/7/19 23:40  87      

















Intake and Output  


 


 2/7/19 2/8/19





 19:00 07:00


 


Intake Total 480 ml 


 


Balance 480 ml 


 


  


 


Intake Oral 480 ml 


 


# Voids 2 3








Laboratory Tests


2/8/19 04:00: 


White Blood Count 9.4, Red Blood Count 4.43, Hemoglobin 12.8, Hematocrit 39.0, 

Mean Corpuscular Volume 88, Mean Corpuscular Hemoglobin 29.0, Mean Corpuscular 

Hemoglobin Concent 32.9, Red Cell Distribution Width 13.7, Platelet Count 179, 

Mean Platelet Volume 7.1, Neutrophils (%) (Auto) 73.9, Lymphocytes (%) (Auto) 

17.0L, Monocytes (%) (Auto) 7.7, Eosinophils (%) (Auto) 0.9, Basophils (%) (Auto

) 0.5, Sodium Level 142, Potassium Level 4.1, Chloride Level 104, Carbon 

Dioxide Level 32, Anion Gap 6, Blood Urea Nitrogen 17, Creatinine 0.7, Estimat 

Glomerular Filtration Rate > 60, Glucose Level 66L, Calcium Level 9.1, Total 

Bilirubin 0.6, Aspartate Amino Transf (AST/SGOT) 48H, Alanine Aminotransferase (

ALT/SGPT) 58, Alkaline Phosphatase 59, Total Protein 6.3L, Albumin 2.8L, 

Globulin 3.5, Albumin/Globulin Ratio 0.8L





Current Medications








 Medications


  (Trade)  Dose


 Ordered  Sig/Mona


 Route


 PRN Reason  Start Time


 Stop Time Status Last Admin


Dose Admin


 


 Albuterol/


 Ipratropium


  (Albuterol/


 Ipratropium)  3 ml  Q4H  PRN


 HHN


 Shortness of Breath  2/5/19 20:45


 2/10/19 20:44   


 


 


 Albuterol/


 Ipratropium


  (Albuterol/


 Ipratropium)  3 ml  Q6HRT


 HHN


   2/6/19 19:00


 2/11/19 18:59  2/8/19 18:59


 


 


 Azithromycin


  (Zithromax)  250 mg  DAILY


 ORAL


   2/7/19 09:00


 2/14/19 08:59  2/7/19 08:42


 


 


 Ceftriaxone


 Sodium 1 gm/


 Dextrose  55 ml @ 


 110 mls/hr  Q24H


 IVPB


   2/6/19 14:30


 2/13/19 14:29  2/8/19 13:54


 


 


 Famotidine


  (Pepcid)  20 mg  DAILY


 ORAL


   2/8/19 09:00


 3/10/19 08:59   


 


 


 Haloperidol


 Lactate


  (Haldol)  5 mg  Q6H  PRN


 IM


 Agitation  2/8/19 20:00


 3/10/19 19:59   


 


 


 Heparin Sodium


  (Porcine)


  (Heparin 5000


 units/ml)  5,000 units  EVERY 12  HOURS


 SUBQ


   2/7/19 21:00


 3/9/19 20:59  2/8/19 20:18


 


 


 Ibuprofen


  (Advil)  400 mg  Q6H  PRN


 ORAL


 For Pain  2/5/19 20:45


 3/7/19 20:44   


 


 


 Lorazepam


  (Ativan 2mg/ml


 1ml)  1 mg  Q4H  PRN


 IM


 For Anxiety  2/8/19 20:00


 2/15/19 19:59   


 


 


 Prednisone


  (predniSONE)  60 mg  Q24H


 ORAL


   2/8/19 18:00


 3/10/19 17:59  2/8/19 17:25


 

















Lenny Durand MD Feb 8, 2019 21:11

## 2019-02-08 NOTE — NUR
NURSE NOTES:

Dr. PIERRE Lewis at nurse station, ordered to change Right AC peripheral IV to different site. 
IV access site changed to Left forearm 22 gauge saline lock. Noted.

## 2019-02-08 NOTE — NUR
NURSE NOTES:

Made Dr. PIERRE Lewis aware of right eye redness. Dr. PIERRE Lewis acknowledged and ordered to 
monitor at this time. Noted. Will continue to monitor patient.

## 2019-02-08 NOTE — NUR
NURSE NOTES:

Per Dr. Coombs, Dr. Irizarry is psyche consult. Order entered, noted, and carried out. Noted.

## 2019-02-08 NOTE — NUR
NURSE NOTES:

Informed Dr. Coombs of speech therapist recommendations for diet texture feeding. Dr. Coombs 
acknowledged and ordered speech therapist recommendations for feeding. Per speech therapist 
may place patient on regular soup-like texture, nectar thick liquid, one to one feeder, and 
one teaspoon per scoop. Order entered, noted, and carried out. Will continue to monitor 
patient.

## 2019-02-08 NOTE — NUR
SWALLOW/SPEECH THERAPY NOTE:  SEE SWALLOW EVAL COMPLETED POST CHART REVIEW.



DYSPHAGIA RISK FACTORS FOR THIS 58 Y.O.F.:

ACUTE COPD EXACERBATION, TRANSIENT WOMAN WITH POSS H/O DRUG/ETOH ABUSE, 

DENIES SMOKING, CONFUSED AND IMPULSIVE



ON BIPAP MOSTLY AND LAST NIGHT HAD S/S OF ASP ON MEAL (WAS ON [facility] SOFT CHOPPED AND THIN 
LIQUIDS PER RN), MADE NPO.

CURRENTLY ON VENTURI-MASK 55% AND RR 18 HR 87 O2 SATS 99 



? DIET PRIOR TO ADMIT, PATIENT DENIES SWALLOWING PROBLEMS



INITIAL IMPRESSIONS:

S/S OF DYSPHAGIA AND ASPIRATION WITH PUREED (TSP) COUGHED AFTER THE SWALLOW



NO OVERT S/S OF ASPIRATION WITH NECTAR THICK LIQUID TSP/CUP SIP AND ON [place] SWALLOW PROTOCOL 
(90ML SEQUENTIAL SIPS VIA CUP).  DID GET SOB AFTER SWALLOWING AND HELD HER STOMACH POSSIBLY 
BECAUSE THE WATER WAS COLD.



GROSSLY FUNCTIONAL OROMOTOR AND FAIR HYOLARYNGEAL ELEVATION SKILLS AND VOICE BUT EDENTULOUS 
(NO DENTURES).



RECOMMENDATIONS:

GIVEN RISK FOR ASPIRATION (?SILENT), CONFUSION, IMPULSIVITY, AND CXR INFILTRATE CONSIDER MOD 
BARIUM SWALLOW STUDY PRIOR TO PO INTAKE.  



GIVEN THAT PATIENT HAS BEEN ASKING CONTINUOUSLY FOR FOOD/LIQUIDS AND WANTS TO EAT FOR 
QUALITY OF LIFE (NO NONORAL FEEDINGS WANTED),

CONSIDER INITIATING LIQUIFIED PUREED LIKE NECTAR THICK SOUP DIET WITH TSP ONLY AND DIET TYPE 
PER RD (NO EVAL TO DATE)



NEEDS 1-1 FEEDING AS PATIENT IS IMPULSIVE AND WATCH VITALS CAREFULLY.

DO NOT FEED WHILE ON BIPAP.



SWALLOW MANAGEMENT AND TX WITH UPGRADES TO DIET AS TOLERATED



D/W VIGNESH LLOYD WHO WILL CALL DR MARIN TO APPROVE THE DIET

POSTED ASPIRATION PRECAUTIONS



CONSIDER COG-LANG EVAL/TX AS PATIENT IS VERY CONFUSED. 





02/08/19



Procedure: XRAY Chest 1v

Indication: Dyspnea

 

Comparison:  2/5/2019

A single view chest radiograph was obtained.

Findings:

Increased interstitial markings noted at the right upper lobe slightly more

pronounced than on the prior occasion. This could be artifactual as there 

is some radiographic tubing. Suggest follow-up and clinically correlate. 

Right size remains

normal.

 

IMPRESSION:

Question of a developing infiltrate in the right upper lobe. Follow-up 

recommended

## 2019-02-08 NOTE — DIAGNOSTIC IMAGING REPORT
Indication: Dyspnea

 

Comparison:  2/5/2019

 

A single view chest radiograph was obtained.

 

Findings:

 

Increased interstitial markings noted at the right upper lobe slightly more

pronounced than on the prior occasion. This could be artifactual as there is some

radiographic tubing. Suggest follow-up and clinically correlate. Right size remains

normal.

 

IMPRESSION:

 

Question of a developing infiltrate in the right upper lobe. Follow-up recommended

## 2019-02-08 NOTE — INFECTIOUS DISEASES PROG NOTE
Assessment/Plan


Assessment/Plan


A:


1. COPD exacerbation,


2. Hypercapnic respiratory failure.


3. Homelessness.


4. Elevated transaminase


5. Leukocytosis resolved


6. ? pneumonia


P:


Continue Rocephin & Zithromax


Change IV site





Subjective


ROS Limited/Unobtainable:  Yes


Constitutional:  Reports: no symptoms


Respiratory:  Reports: shortness of breath


Cardiovascular:  Reports: other - pain in IV site


Allergies:  


Coded Allergies:  


     No Known Allergies (Unverified , 2/5/19)





Objective


Vital Signs





Last 24 Hour Vital Signs








  Date Time  Temp Pulse Resp B/P (MAP) Pulse Ox O2 Delivery O2 Flow Rate FiO2


 


2/8/19 13:46  100 20  99 Venturi Mask 14.0 55


 


2/8/19 13:34  100 20  93 Nasal Cannula 3.0 32


 


2/8/19 13:10  90 22  93   


 


2/8/19 12:00      Bi-pap  


 


2/8/19 12:00  96      


 


2/8/19 12:00 97.8 87 18 130/68 (88) 99   


 


2/8/19 12:00        32


 


2/8/19 11:20  77 22  99   


 


2/8/19 09:16  89 20  98   


 


2/8/19 08:00        32


 


2/8/19 08:00  88      


 


2/8/19 08:00 97.9 108 16 105/90 (95) 99   


 


2/8/19 08:00      Bi-pap  


 


2/8/19 07:59  96 22  98 Venturi Mask 14.0 55


 


2/8/19 07:50  83 20  98 Venturi Mask 14.0 55


 


2/8/19 07:50  83 20  98   


 


2/8/19 04:00 98.8 86 24 139/88 (105) 98   


 


2/8/19 04:00        32


 


2/8/19 04:00      Bi-pap  


 


2/8/19 03:34  71      


 


2/8/19 02:41  78 25  98 Facial  45


 


2/8/19 00:54  88 24  99 Bi-pap  45


 


2/8/19 00:47  84 25  98 Bi-pap  45


 


2/8/19 00:47  84 24  98 Facial  45


 


2/8/19 00:00      Bi-pap  


 


2/8/19 00:00 97.9 91 20 134/83 (100) 95   


 


2/7/19 23:40  87      


 


2/7/19 21:00       3.0 


 


2/7/19 20:47  79 23  98 Facial  45


 


2/7/19 20:09  98 22  98 Bi-pap  45


 


2/7/19 20:02  96 23  97 Bi-pap  45


 


2/7/19 20:02  81 24  98 Facial  45


 


2/7/19 20:00 97.9 88 20 127/75 (92) 99   


 


2/7/19 20:00        32


 


2/7/19 20:00      Bi-pap  


 


2/7/19 19:29  81      


 


2/7/19 17:18  83 28  99 Facial  45


 


2/7/19 16:00      Bi-pap  


 


2/7/19 16:00        32


 


2/7/19 16:00 98.6 102 24 122/76 (91) 98   


 


2/7/19 15:37  81      


 


2/7/19 15:09  97 23  95 Facial  45








Height (Feet):  5


Height (Inches):  5.00


Weight (Pounds):  123


HEENT:  mucous membranes moist, other - R subconjuctival bleeding


Respiratory/Chest:  lungs clear


Cardiovascular:  normal rate, tachycardia


Abdomen:  soft, non tender


Extremities:  no edema, other - erythema, tenderness in R arm IV site


Neurologic/Psychiatric:  alert, responsive





Microbiology








 Date/Time


Source Procedure


Growth Status


 


 


 2/5/19 15:35


Blood Blood Culture - Preliminary


NO GROWTH AFTER 48 HOURS Resulted


 


 2/5/19 15:20


Blood Blood Culture - Preliminary


NO GROWTH AFTER 48 HOURS Resulted


 


 2/5/19 17:00


Nose MRSA Culture - Final


NO METHICILLIN RESISTANT STAPH AUREUS... Complete


 


 2/5/19 15:20


Nasal Nares Influenza Types A,B Antigen (DANIELA) - Final Complete


 


 2/5/19 17:00


Rectum VRE Culture - Final


NO VANCOMYCIN RESISTANT ENTEROCOCCUS ... Complete


 


 2/5/19 17:00


Rectum - Final


NO CARBAPENEM-RESISTANT ENTEROBACTERI... Complete











Laboratory Tests








Test


  2/8/19


04:00


 


White Blood Count


  9.4 K/UL


(4.8-10.8)


 


Red Blood Count


  4.43 M/UL


(4.20-5.40)


 


Hemoglobin


  12.8 G/DL


(12.0-16.0)


 


Hematocrit


  39.0 %


(37.0-47.0)


 


Mean Corpuscular Volume 88 FL (80-99)  


 


Mean Corpuscular Hemoglobin


  29.0 PG


(27.0-31.0)


 


Mean Corpuscular Hemoglobin


Concent 32.9 G/DL


(32.0-36.0)


 


Red Cell Distribution Width


  13.7 %


(11.6-14.8)


 


Platelet Count


  179 K/UL


(150-450)


 


Mean Platelet Volume


  7.1 FL


(6.5-10.1)


 


Neutrophils (%) (Auto)


  73.9 %


(45.0-75.0)


 


Lymphocytes (%) (Auto)


  17.0 %


(20.0-45.0)  L


 


Monocytes (%) (Auto)


  7.7 %


(1.0-10.0)


 


Eosinophils (%) (Auto)


  0.9 %


(0.0-3.0)


 


Basophils (%) (Auto)


  0.5 %


(0.0-2.0)


 


Sodium Level


  142 MMOL/L


(136-145)


 


Potassium Level


  4.1 MMOL/L


(3.5-5.1)


 


Chloride Level


  104 MMOL/L


()


 


Carbon Dioxide Level


  32 MMOL/L


(21-32)


 


Anion Gap


  6 mmol/L


(5-15)


 


Blood Urea Nitrogen


  17 mg/dL


(7-18)


 


Creatinine


  0.7 MG/DL


(0.55-1.30)


 


Estimat Glomerular Filtration


Rate > 60 mL/min


(>60)


 


Glucose Level


  66 MG/DL


()  L


 


Calcium Level


  9.1 MG/DL


(8.5-10.1)


 


Total Bilirubin


  0.6 MG/DL


(0.2-1.0)


 


Aspartate Amino Transf


(AST/SGOT) 48 U/L (15-37)


H


 


Alanine Aminotransferase


(ALT/SGPT) 58 U/L (12-78)


 


 


Alkaline Phosphatase


  59 U/L


()


 


Total Protein


  6.3 G/DL


(6.4-8.2)  L


 


Albumin


  2.8 G/DL


(3.4-5.0)  L


 


Globulin 3.5 g/dL  


 


Albumin/Globulin Ratio


  0.8 (1.0-2.7)


L











Current Medications








 Medications


  (Trade)  Dose


 Ordered  Sig/Mona


 Route


 PRN Reason  Start Time


 Stop Time Status Last Admin


Dose Admin


 


 Albuterol/


 Ipratropium


  (Albuterol/


 Ipratropium)  3 ml  Q4H  PRN


 HHN


 Shortness of Breath  2/5/19 20:45


 2/10/19 20:44   


 


 


 Albuterol/


 Ipratropium


  (Albuterol/


 Ipratropium)  3 ml  Q6HRT


 HHN


   2/6/19 19:00


 2/11/19 18:59  2/8/19 13:38


 


 


 Azithromycin


  (Zithromax)  250 mg  DAILY


 ORAL


   2/7/19 09:00


 2/14/19 08:59  2/7/19 08:42


 


 


 Ceftriaxone


 Sodium 1 gm/


 Dextrose  55 ml @ 


 110 mls/hr  Q24H


 IVPB


   2/6/19 14:30


 2/13/19 14:29  2/8/19 13:54


 


 


 Famotidine


  (Pepcid)  20 mg  DAILY


 ORAL


   2/8/19 09:00


 3/10/19 08:59   


 


 


 Heparin Sodium


  (Porcine)


  (Heparin 5000


 units/ml)  5,000 units  EVERY 12  HOURS


 SUBQ


   2/7/19 21:00


 3/9/19 20:59  2/7/19 20:31


 


 


 Ibuprofen


  (Advil)  400 mg  Q6H  PRN


 ORAL


 For Pain  2/5/19 20:45


 3/7/19 20:44   


 


 


 Prednisone


  (predniSONE)  60 mg  Q24H


 ORAL


   2/8/19 18:00


 3/10/19 17:59   


 

















Elijah Lewis MD Feb 8, 2019 14:53

## 2019-02-08 NOTE — NUR
NURSE NOTES:

Received report from GABINO Joseph. Patient is resting in bed, in stable condition. No s/sx 
SOB, breathing is even and unlabored. Observed no presence of pain or discomfort at this 
time. Bed is in lowest position, brakes engaged. Bilateral soft wrist restraints noted for 
patient safety, patient was noted pulling on medical devices. Call light is kept within easy 
reach. Will continue to monitor patient.

## 2019-02-08 NOTE — GI PROGRESS NOTE
Assessment/Plan


Problems:  


(1) Transaminitis


ICD Codes:  R74.0 - Nonspecific elevation of levels of transaminase and lactic 

acid dehydrogenase [LDH]


SNOMED:  079838689, 311242690


(2) COPD exacerbation


ICD Codes:  J44.1 - Chronic obstructive pulmonary disease with (acute) 

exacerbation


SNOMED:  726285445


Status:  stable


Status Narrative


Discussed with Dr. Ndiaye


Assessment/Plan


Transaminitis resolving


Patient now off BiPAP


Hepatitis panel reviewed.  Patient positive for hepatitis C





Symptomatic treatment


okay to advance to regular diet


Zofran as needed


ppi


fu labs


Outpatient GI procedures, outpatient hepatitis C tx





The patient was seen and examined at bedside and all new and available data was 

reviewed in the patients chart. I agree with the above findings, impression 

and plan.  (Patient seen earlier today. Signature stamp does not reflect 

patient encounter time.). - Juan Miguel Ndiaye MD





Subjective


Subjective


Denies any abdominal pain


Denies any nausea or vomiting


Denies any constipation or diarrhea


Tolerating food





Objective





Last 24 Hour Vital Signs








  Date Time  Temp Pulse Resp B/P (MAP) Pulse Ox O2 Delivery O2 Flow Rate FiO2


 


2/8/19 13:46  100 20  99 Venturi Mask 14.0 55


 


2/8/19 13:34  100 20  93 Nasal Cannula 3.0 32


 


2/8/19 13:10  90 22  93   


 


2/8/19 12:00      Bi-pap  


 


2/8/19 12:00  96      


 


2/8/19 12:00 97.8 87 18 130/68 (88) 99   


 


2/8/19 12:00        32


 


2/8/19 11:20  77 22  99   


 


2/8/19 09:16  89 20  98   


 


2/8/19 08:00        32


 


2/8/19 08:00  88      


 


2/8/19 08:00 97.9 108 16 105/90 (95) 99   


 


2/8/19 08:00      Bi-pap  


 


2/8/19 07:59  96 22  98 Venturi Mask 14.0 55


 


2/8/19 07:50  83 20  98 Venturi Mask 14.0 55


 


2/8/19 07:50  83 20  98   


 


2/8/19 04:00 98.8 86 24 139/88 (105) 98   


 


2/8/19 04:00        32


 


2/8/19 04:00      Bi-pap  


 


2/8/19 03:34  71      


 


2/8/19 02:41  78 25  98 Facial  45


 


2/8/19 00:54  88 24  99 Bi-pap  45


 


2/8/19 00:47  84 25  98 Bi-pap  45


 


2/8/19 00:47  84 24  98 Facial  45


 


2/8/19 00:00      Bi-pap  


 


2/8/19 00:00 97.9 91 20 134/83 (100) 95   


 


2/7/19 23:40  87      


 


2/7/19 21:00       3.0 


 


2/7/19 20:47  79 23  98 Facial  45


 


2/7/19 20:09  98 22  98 Bi-pap  45


 


2/7/19 20:02  96 23  97 Bi-pap  45


 


2/7/19 20:02  81 24  98 Facial  45


 


2/7/19 20:00 97.9 88 20 127/75 (92) 99   


 


2/7/19 20:00        32


 


2/7/19 20:00      Bi-pap  


 


2/7/19 19:29  81      


 


2/7/19 17:18  83 28  99 Facial  45


 


2/7/19 16:00      Bi-pap  


 


2/7/19 16:00        32


 


2/7/19 16:00 98.6 102 24 122/76 (91) 98   


 


2/7/19 15:37  81      

















Intake and Output  


 


 2/7/19 2/8/19





 19:00 07:00


 


Intake Total 480 ml 


 


Balance 480 ml 


 


  


 


Intake Oral 480 ml 


 


# Voids 2 3











Laboratory Tests








Test


  2/8/19


04:00


 


White Blood Count


  9.4 K/UL


(4.8-10.8)


 


Red Blood Count


  4.43 M/UL


(4.20-5.40)


 


Hemoglobin


  12.8 G/DL


(12.0-16.0)


 


Hematocrit


  39.0 %


(37.0-47.0)


 


Mean Corpuscular Volume 88 FL (80-99)  


 


Mean Corpuscular Hemoglobin


  29.0 PG


(27.0-31.0)


 


Mean Corpuscular Hemoglobin


Concent 32.9 G/DL


(32.0-36.0)


 


Red Cell Distribution Width


  13.7 %


(11.6-14.8)


 


Platelet Count


  179 K/UL


(150-450)


 


Mean Platelet Volume


  7.1 FL


(6.5-10.1)


 


Neutrophils (%) (Auto)


  73.9 %


(45.0-75.0)


 


Lymphocytes (%) (Auto)


  17.0 %


(20.0-45.0)  L


 


Monocytes (%) (Auto)


  7.7 %


(1.0-10.0)


 


Eosinophils (%) (Auto)


  0.9 %


(0.0-3.0)


 


Basophils (%) (Auto)


  0.5 %


(0.0-2.0)


 


Sodium Level


  142 MMOL/L


(136-145)


 


Potassium Level


  4.1 MMOL/L


(3.5-5.1)


 


Chloride Level


  104 MMOL/L


()


 


Carbon Dioxide Level


  32 MMOL/L


(21-32)


 


Anion Gap


  6 mmol/L


(5-15)


 


Blood Urea Nitrogen


  17 mg/dL


(7-18)


 


Creatinine


  0.7 MG/DL


(0.55-1.30)


 


Estimat Glomerular Filtration


Rate > 60 mL/min


(>60)


 


Glucose Level


  66 MG/DL


()  L


 


Calcium Level


  9.1 MG/DL


(8.5-10.1)


 


Total Bilirubin


  0.6 MG/DL


(0.2-1.0)


 


Aspartate Amino Transf


(AST/SGOT) 48 U/L (15-37)


H


 


Alanine Aminotransferase


(ALT/SGPT) 58 U/L (12-78)


 


 


Alkaline Phosphatase


  59 U/L


()


 


Total Protein


  6.3 G/DL


(6.4-8.2)  L


 


Albumin


  2.8 G/DL


(3.4-5.0)  L


 


Globulin 3.5 g/dL  


 


Albumin/Globulin Ratio


  0.8 (1.0-2.7)


L








Height (Feet):  5


Height (Inches):  5.00


Weight (Pounds):  123


General Appearance:  WD/WN, no apparent distress, alert


Cardiovascular:  normal rate


Respiratory/Chest:  normal breath sounds, no respiratory distress


Abdominal Exam:  normal bowel sounds, non tender, soft


Extremities:  normal range of motion, non-tender











Stacy Hernandez NP Feb 8, 2019 15:12

## 2019-02-08 NOTE — GENERAL PROGRESS NOTE
Assessment/Plan


Problem List:  


(1) COPD exacerbation


ICD Codes:  J44.1 - Chronic obstructive pulmonary disease with (acute) 

exacerbation


SNOMED:  279233083


(2) Transaminitis


ICD Codes:  R74.0 - Nonspecific elevation of levels of transaminase and lactic 

acid dehydrogenase [LDH]


SNOMED:  232219030, 132375120


Status:  tolerating diet


Assessment/Plan


off bipap


clinically improving


elev lft


copd exacerbation improving


agitated


still wheezing at time





Subjective


Respiratory:  Reports: shortness of breath


Allergies:  


Coded Allergies:  


     No Known Allergies (Unverified , 2/5/19)





Objective





Last 24 Hour Vital Signs








  Date Time  Temp Pulse Resp B/P (MAP) Pulse Ox O2 Delivery O2 Flow Rate FiO2


 


2/8/19 20:00      Bi-pap  


 


2/8/19 20:00       2.0 


 


2/8/19 20:00 98.9 89 20 124/63 (83) 93   


 


2/8/19 20:00  110      


 


2/8/19 19:18       3.0 


 


2/8/19 19:07  92 20  98 Nasal Cannula 2.0 28


 


2/8/19 18:58  88 20  94 Nasal Cannula 2.0 28


 


2/8/19 17:12  93 20  93   


 


2/8/19 16:01  78      


 


2/8/19 16:00      Bi-pap  


 


2/8/19 16:00       2.0 


 


2/8/19 16:00 98.4 85 20 120/76 (91) 96   


 


2/8/19 15:15  82 22  95 Facial  45


 


2/8/19 13:46  100 20  99 Venturi Mask 14.0 55


 


2/8/19 13:34  100 20  93 Nasal Cannula 3.0 32


 


2/8/19 13:10  90 22  93   


 


2/8/19 12:00      Bi-pap  


 


2/8/19 12:00  96      


 


2/8/19 12:00 97.8 87 18 130/68 (88) 99   


 


2/8/19 12:00        32


 


2/8/19 11:20  77 22  99   


 


2/8/19 09:16  89 20  98   


 


2/8/19 08:00        32


 


2/8/19 08:00  88      


 


2/8/19 08:00 97.9 108 16 105/90 (95) 99   


 


2/8/19 08:00      Bi-pap  


 


2/8/19 07:59  96 22  98 Venturi Mask 14.0 55


 


2/8/19 07:50  83 20  98 Venturi Mask 14.0 55


 


2/8/19 07:50  83 20  98   


 


2/8/19 04:00 98.8 86 24 139/88 (105) 98   


 


2/8/19 04:00        32


 


2/8/19 04:00      Bi-pap  


 


2/8/19 03:34  71      


 


2/8/19 02:41  78 25  98 Facial  45


 


2/8/19 00:54  88 24  99 Bi-pap  45


 


2/8/19 00:47  84 25  98 Bi-pap  45


 


2/8/19 00:47  84 24  98 Facial  45


 


2/8/19 00:00      Bi-pap  


 


2/8/19 00:00 97.9 91 20 134/83 (100) 95   


 


2/7/19 23:40  87      

















Intake and Output  


 


 2/7/19 2/8/19





 19:00 07:00


 


Intake Total 480 ml 


 


Balance 480 ml 


 


  


 


Intake Oral 480 ml 


 


# Voids 2 3








Laboratory Tests


2/8/19 04:00: 


White Blood Count 9.4, Red Blood Count 4.43, Hemoglobin 12.8, Hematocrit 39.0, 

Mean Corpuscular Volume 88, Mean Corpuscular Hemoglobin 29.0, Mean Corpuscular 

Hemoglobin Concent 32.9, Red Cell Distribution Width 13.7, Platelet Count 179, 

Mean Platelet Volume 7.1, Neutrophils (%) (Auto) 73.9, Lymphocytes (%) (Auto) 

17.0L, Monocytes (%) (Auto) 7.7, Eosinophils (%) (Auto) 0.9, Basophils (%) (Auto

) 0.5, Sodium Level 142, Potassium Level 4.1, Chloride Level 104, Carbon 

Dioxide Level 32, Anion Gap 6, Blood Urea Nitrogen 17, Creatinine 0.7, Estimat 

Glomerular Filtration Rate > 60, Glucose Level 66L, Calcium Level 9.1, Total 

Bilirubin 0.6, Aspartate Amino Transf (AST/SGOT) 48H, Alanine Aminotransferase (

ALT/SGPT) 58, Alkaline Phosphatase 59, Total Protein 6.3L, Albumin 2.8L, 

Globulin 3.5, Albumin/Globulin Ratio 0.8L


Height (Feet):  5


Height (Inches):  5.00


Weight (Pounds):  123


Cardiovascular:  normal rate


Respiratory/Chest:  rhonchi - bilaterally











Melia Coombs MD Feb 8, 2019 23:11

## 2019-02-08 NOTE — NUR
PT RECEIVED STABLE ON VENTI MASK 14 LPM 55%. ALL VS WNL. RN VIGNESH AWARE AND REQUESTED TO 
PLACE PT ON BIPAP AT A LATER TIME DUE TO SPEECH EVALUATION SCHEDULED LATER. PT TOLERATING 
VENTI  MASK WITH A SPO2 OF 98%.  PT HAS BILATERAL SOFT WRIST RESTRAINS SECURELY IN PLACE TO 
PREVENT PT FROM REMOVING MASK? BIPAP. NO S/S OF RESPIRATORY DISTRESS NOTED AT TIS TIME. WILL 
CONTINUE TO MONITOR.

## 2019-02-08 NOTE — NUR
NURSE NOTES:

Patient glucose is 66 from lab results. re checked and is at 80. no changes in mental 
status. will continue to monitor.

## 2019-02-08 NOTE — CONSULTATION
History of Present Illness


General


Chief Complaint:  Generalized Weakness


Referring physician:  GISELLE MARIN


Reason for Consultation:  Transaminitis





Present Illness


HPI


58-year-old female brought in by EMS after increased difficulty breathing and 

generalized weakness. the pt was agitated and unable to reorient the pt was 

confused and unable to provide any hx.


Allergies:  


Coded Allergies:  


     No Known Allergies (Unverified , 2/5/19)





Medication History


No Active Prescriptions or Reported Meds





Patient History


Limited by:  medical condition


History Provided By:  Medical Record, PMD


Healthcare decision maker





Resuscitation status


Full Code


Advanced Directive on File








Past Medical/Surgical History


Past Medical/Surgical History:  


(1) Transaminitis


(2) COPD exacerbation





Review of Systems


Psychiatric:  Reports: prior hx, anxiety, depressed feelings, emotional problems

, hallucinations





Physical Exam


General Appearance:  alert, confused, severe distress, agitated





Last 24 Hour Vital Signs








  Date Time  Temp Pulse Resp B/P (MAP) Pulse Ox O2 Delivery O2 Flow Rate FiO2


 


2/8/19 19:18       3.0 


 


2/8/19 19:07  92 20  98 Nasal Cannula 2.0 28


 


2/8/19 18:58  88 20  94 Nasal Cannula 2.0 28


 


2/8/19 17:12  93 20  93   


 


2/8/19 16:01  78      


 


2/8/19 16:00      Bi-pap  


 


2/8/19 16:00       2.0 


 


2/8/19 16:00 98.4 85 20 120/76 (91) 96   


 


2/8/19 15:15  82 22  95 Facial  45


 


2/8/19 13:46  100 20  99 Venturi Mask 14.0 55


 


2/8/19 13:34  100 20  93 Nasal Cannula 3.0 32


 


2/8/19 13:10  90 22  93   


 


2/8/19 12:00      Bi-pap  


 


2/8/19 12:00  96      


 


2/8/19 12:00 97.8 87 18 130/68 (88) 99   


 


2/8/19 12:00        32


 


2/8/19 11:20  77 22  99   


 


2/8/19 09:16  89 20  98   


 


2/8/19 08:00        32


 


2/8/19 08:00  88      


 


2/8/19 08:00 97.9 108 16 105/90 (95) 99   


 


2/8/19 08:00      Bi-pap  


 


2/8/19 07:59  96 22  98 Venturi Mask 14.0 55


 


2/8/19 07:50  83 20  98 Venturi Mask 14.0 55


 


2/8/19 07:50  83 20  98   


 


2/8/19 04:00 98.8 86 24 139/88 (105) 98   


 


2/8/19 04:00        32


 


2/8/19 04:00      Bi-pap  


 


2/8/19 03:34  71      


 


2/8/19 02:41  78 25  98 Facial  45


 


2/8/19 00:54  88 24  99 Bi-pap  45


 


2/8/19 00:47  84 25  98 Bi-pap  45


 


2/8/19 00:47  84 24  98 Facial  45


 


2/8/19 00:00      Bi-pap  


 


2/8/19 00:00 97.9 91 20 134/83 (100) 95   


 


2/7/19 23:40  87      


 


2/7/19 21:00       3.0 


 


2/7/19 20:47  79 23  98 Facial  45


 


2/7/19 20:09  98 22  98 Bi-pap  45


 


2/7/19 20:02  96 23  97 Bi-pap  45


 


2/7/19 20:02  81 24  98 Facial  45


 


2/7/19 20:00 97.9 88 20 127/75 (92) 99   


 


2/7/19 20:00        32


 


2/7/19 20:00      Bi-pap  

















Intake and Output  


 


 2/7/19 2/8/19





 19:00 07:00


 


Intake Total 480 ml 


 


Balance 480 ml 


 


  


 


Intake Oral 480 ml 


 


# Voids 2 3











Laboratory Tests








Test


  2/8/19


04:00


 


White Blood Count


  9.4 K/UL


(4.8-10.8)


 


Red Blood Count


  4.43 M/UL


(4.20-5.40)


 


Hemoglobin


  12.8 G/DL


(12.0-16.0)


 


Hematocrit


  39.0 %


(37.0-47.0)


 


Mean Corpuscular Volume 88 FL (80-99)  


 


Mean Corpuscular Hemoglobin


  29.0 PG


(27.0-31.0)


 


Mean Corpuscular Hemoglobin


Concent 32.9 G/DL


(32.0-36.0)


 


Red Cell Distribution Width


  13.7 %


(11.6-14.8)


 


Platelet Count


  179 K/UL


(150-450)


 


Mean Platelet Volume


  7.1 FL


(6.5-10.1)


 


Neutrophils (%) (Auto)


  73.9 %


(45.0-75.0)


 


Lymphocytes (%) (Auto)


  17.0 %


(20.0-45.0)  L


 


Monocytes (%) (Auto)


  7.7 %


(1.0-10.0)


 


Eosinophils (%) (Auto)


  0.9 %


(0.0-3.0)


 


Basophils (%) (Auto)


  0.5 %


(0.0-2.0)


 


Sodium Level


  142 MMOL/L


(136-145)


 


Potassium Level


  4.1 MMOL/L


(3.5-5.1)


 


Chloride Level


  104 MMOL/L


()


 


Carbon Dioxide Level


  32 MMOL/L


(21-32)


 


Anion Gap


  6 mmol/L


(5-15)


 


Blood Urea Nitrogen


  17 mg/dL


(7-18)


 


Creatinine


  0.7 MG/DL


(0.55-1.30)


 


Estimat Glomerular Filtration


Rate > 60 mL/min


(>60)


 


Glucose Level


  66 MG/DL


()  L


 


Calcium Level


  9.1 MG/DL


(8.5-10.1)


 


Total Bilirubin


  0.6 MG/DL


(0.2-1.0)


 


Aspartate Amino Transf


(AST/SGOT) 48 U/L (15-37)


H


 


Alanine Aminotransferase


(ALT/SGPT) 58 U/L (12-78)


 


 


Alkaline Phosphatase


  59 U/L


()


 


Total Protein


  6.3 G/DL


(6.4-8.2)  L


 


Albumin


  2.8 G/DL


(3.4-5.0)  L


 


Globulin 3.5 g/dL  


 


Albumin/Globulin Ratio


  0.8 (1.0-2.7)


L








Height (Feet):  5


Height (Inches):  5.00


Weight (Pounds):  123


Medications





Current Medications








 Medications


  (Trade)  Dose


 Ordered  Sig/Mona


 Route


 PRN Reason  Start Time


 Stop Time Status Last Admin


Dose Admin


 


 Albuterol/


 Ipratropium


  (Albuterol/


 Ipratropium)  3 ml  Q4H  PRN


 HHN


 Shortness of Breath  2/5/19 20:45


 2/10/19 20:44   


 


 


 Albuterol/


 Ipratropium


  (Albuterol/


 Ipratropium)  3 ml  Q6HRT


 HHN


   2/6/19 19:00


 2/11/19 18:59  2/8/19 18:59


 


 


 Azithromycin


  (Zithromax)  250 mg  DAILY


 ORAL


   2/7/19 09:00


 2/14/19 08:59  2/7/19 08:42


 


 


 Ceftriaxone


 Sodium 1 gm/


 Dextrose  55 ml @ 


 110 mls/hr  Q24H


 IVPB


   2/6/19 14:30


 2/13/19 14:29  2/8/19 13:54


 


 


 Famotidine


  (Pepcid)  20 mg  DAILY


 ORAL


   2/8/19 09:00


 3/10/19 08:59   


 


 


 Heparin Sodium


  (Porcine)


  (Heparin 5000


 units/ml)  5,000 units  EVERY 12  HOURS


 SUBQ


   2/7/19 21:00


 3/9/19 20:59  2/7/19 20:31


 


 


 Ibuprofen


  (Advil)  400 mg  Q6H  PRN


 ORAL


 For Pain  2/5/19 20:45


 3/7/19 20:44   


 


 


 Prednisone


  (predniSONE)  60 mg  Q24H


 ORAL


   2/8/19 18:00


 3/10/19 17:59  2/8/19 17:25


 











Assessment/Plan


Problem List:  


(1) Acute metabolic encephalopathy


ICD Codes:  G93.41 - Metabolic encephalopathy


SNOMED:  16283521, 957033192


Assessment/Plan


haldol im


ativan im


cont bilat restraints











Eddie Irizarry MD Feb 8, 2019 19:50

## 2019-02-09 VITALS — DIASTOLIC BLOOD PRESSURE: 68 MMHG | SYSTOLIC BLOOD PRESSURE: 120 MMHG

## 2019-02-09 VITALS — DIASTOLIC BLOOD PRESSURE: 84 MMHG | SYSTOLIC BLOOD PRESSURE: 133 MMHG

## 2019-02-09 VITALS — SYSTOLIC BLOOD PRESSURE: 133 MMHG | DIASTOLIC BLOOD PRESSURE: 84 MMHG

## 2019-02-09 VITALS — SYSTOLIC BLOOD PRESSURE: 128 MMHG | DIASTOLIC BLOOD PRESSURE: 68 MMHG

## 2019-02-09 VITALS — SYSTOLIC BLOOD PRESSURE: 118 MMHG | DIASTOLIC BLOOD PRESSURE: 72 MMHG

## 2019-02-09 LAB
ADD MANUAL DIFF: NO
ALBUMIN SERPL-MCNC: 2.8 G/DL (ref 3.4–5)
ALBUMIN/GLOB SERPL: 0.7 {RATIO} (ref 1–2.7)
ALP SERPL-CCNC: 60 U/L (ref 46–116)
ALT SERPL-CCNC: 61 U/L (ref 12–78)
ANION GAP SERPL CALC-SCNC: 6 MMOL/L (ref 5–15)
AST SERPL-CCNC: 35 U/L (ref 15–37)
BASOPHILS NFR BLD AUTO: 0.2 % (ref 0–2)
BILIRUB SERPL-MCNC: 0.9 MG/DL (ref 0.2–1)
BUN SERPL-MCNC: 13 MG/DL (ref 7–18)
CALCIUM SERPL-MCNC: 9.7 MG/DL (ref 8.5–10.1)
CHLORIDE SERPL-SCNC: 102 MMOL/L (ref 98–107)
CO2 SERPL-SCNC: 31 MMOL/L (ref 21–32)
CREAT SERPL-MCNC: 0.6 MG/DL (ref 0.55–1.3)
EOSINOPHIL NFR BLD AUTO: 0 % (ref 0–3)
ERYTHROCYTE [DISTWIDTH] IN BLOOD BY AUTOMATED COUNT: 13 % (ref 11.6–14.8)
GLOBULIN SER-MCNC: 3.8 G/DL
HCT VFR BLD CALC: 38.7 % (ref 37–47)
HGB BLD-MCNC: 12.8 G/DL (ref 12–16)
LYMPHOCYTES NFR BLD AUTO: 9.9 % (ref 20–45)
MCV RBC AUTO: 87 FL (ref 80–99)
MONOCYTES NFR BLD AUTO: 5.4 % (ref 1–10)
NEUTROPHILS NFR BLD AUTO: 84.5 % (ref 45–75)
PLATELET # BLD: 172 K/UL (ref 150–450)
POTASSIUM SERPL-SCNC: 3.9 MMOL/L (ref 3.5–5.1)
RBC # BLD AUTO: 4.43 M/UL (ref 4.2–5.4)
SODIUM SERPL-SCNC: 139 MMOL/L (ref 136–145)
WBC # BLD AUTO: 8.4 K/UL (ref 4.8–10.8)

## 2019-02-09 RX ADMIN — SODIUM CHLORIDE SCH MLS/HR: 0.9 INJECTION INTRAVENOUS at 14:35

## 2019-02-09 RX ADMIN — HALOPERIDOL LACTATE PRN MG: 5 INJECTION, SOLUTION INTRAMUSCULAR at 08:51

## 2019-02-09 RX ADMIN — AZITHROMYCIN DIHYDRATE SCH MG: 250 TABLET, FILM COATED ORAL at 08:45

## 2019-02-09 RX ADMIN — IPRATROPIUM BROMIDE AND ALBUTEROL SULFATE SCH ML: .5; 3 SOLUTION RESPIRATORY (INHALATION) at 19:04

## 2019-02-09 RX ADMIN — HEPARIN SODIUM SCH UNITS: 5000 INJECTION INTRAVENOUS; SUBCUTANEOUS at 08:46

## 2019-02-09 RX ADMIN — IPRATROPIUM BROMIDE AND ALBUTEROL SULFATE SCH ML: .5; 3 SOLUTION RESPIRATORY (INHALATION) at 07:30

## 2019-02-09 RX ADMIN — IPRATROPIUM BROMIDE AND ALBUTEROL SULFATE SCH ML: .5; 3 SOLUTION RESPIRATORY (INHALATION) at 13:40

## 2019-02-09 RX ADMIN — IPRATROPIUM BROMIDE AND ALBUTEROL SULFATE SCH ML: .5; 3 SOLUTION RESPIRATORY (INHALATION) at 01:18

## 2019-02-09 RX ADMIN — HEPARIN SODIUM SCH UNITS: 5000 INJECTION INTRAVENOUS; SUBCUTANEOUS at 21:19

## 2019-02-09 RX ADMIN — HALOPERIDOL LACTATE PRN MG: 5 INJECTION, SOLUTION INTRAMUSCULAR at 21:20

## 2019-02-09 NOTE — GENERAL PROGRESS NOTE
Assessment/Plan


Problem List:  


(1) COPD exacerbation


ICD Codes:  J44.1 - Chronic obstructive pulmonary disease with (acute) 

exacerbation


SNOMED:  075155235


(2) Transaminitis


ICD Codes:  R74.0 - Nonspecific elevation of levels of transaminase and lactic 

acid dehydrogenase [LDH]


SNOMED:  891248700, 045124998


Status:  progressing


Assessment/Plan


off bipap


clinically improving


elev lft improved


copd exacerbation improving


labs wnl





Subjective


Respiratory:  Reports: shortness of breath


Allergies:  


Coded Allergies:  


     No Known Allergies (Unverified , 2/5/19)





Objective





Last 24 Hour Vital Signs








  Date Time  Temp Pulse Resp B/P (MAP) Pulse Ox O2 Delivery O2 Flow Rate FiO2


 


2/9/19 16:26  81      


 


2/9/19 16:00 97.7 88 20 133/84 (100) 100   


 


2/9/19 16:00       3.0 


 


2/9/19 16:00      Bi-pap  


 


2/9/19 13:40      Nasal Cannula 2.0 


 


2/9/19 13:40      Nasal Cannula 2.0 


 


2/9/19 12:00      Bi-pap  


 


2/9/19 12:00 97.5 80 20 133/84 (100) 96   


 


2/9/19 12:00       3.0 


 


2/9/19 11:32  86      


 


2/9/19 09:44  83      


 


2/9/19 08:00      Bi-pap  


 


2/9/19 08:00 97.5 95 18 128/68 (88) 96   


 


2/9/19 08:00       3.0 


 


2/9/19 07:35  81 18  97 Nasal Cannula 3.0 32


 


2/9/19 07:30  81 20  96  3.0 32


 


2/9/19 07:30  81 20  99 Nasal Cannula 2.0 28


 


2/9/19 04:30  74 20  95  3.0 32


 


2/9/19 04:00 97.6 68 16 120/68 (85) 98   


 


2/9/19 04:00       3.0 


 


2/9/19 04:00      Bi-pap  


 


2/9/19 04:00  67      


 


2/9/19 03:10  73 24  97 Facial  30


 


2/9/19 01:26  77 17  97 Bi-pap  30


 


2/9/19 01:16  73 19  98 Nasal Cannula 2.0 28


 


2/9/19 01:15  73 19  98 Facial  40


 


2/9/19 00:00       2.0 


 


2/9/19 00:00      Bi-pap  


 


2/9/19 00:00  64      


 


2/9/19 00:00 97.7 64 19 118/72 (87) 98   


 


2/8/19 23:15  71 18  95 Facial  40


 


2/8/19 20:00      Bi-pap  


 


2/8/19 20:00       2.0 


 


2/8/19 20:00 98.9 89 20 124/63 (83) 93   


 


2/8/19 20:00  110      


 


2/8/19 19:18       3.0 


 


2/8/19 19:07  92 20  98 Nasal Cannula 2.0 28


 


2/8/19 18:58  88 20  94 Nasal Cannula 2.0 28

















Intake and Output  


 


 2/8/19 2/9/19





 19:00 07:00


 


Intake Total 500 ml 


 


Balance 500 ml 


 


  


 


Intake Oral 500 ml 


 


# Voids 2 2


 


# Bowel Movements 1 3








Laboratory Tests


2/9/19 04:40: 


White Blood Count 8.4, Red Blood Count 4.43, Hemoglobin 12.8, Hematocrit 38.7, 

Mean Corpuscular Volume 87, Mean Corpuscular Hemoglobin 28.8, Mean Corpuscular 

Hemoglobin Concent 33.0, Red Cell Distribution Width 13.0, Platelet Count 172, 

Mean Platelet Volume 7.2, Neutrophils (%) (Auto) 84.5H, Lymphocytes (%) (Auto) 

9.9L, Monocytes (%) (Auto) 5.4, Eosinophils (%) (Auto) 0.0, Basophils (%) (Auto

) 0.2, Sodium Level 139, Potassium Level 3.9, Chloride Level 102, Carbon 

Dioxide Level 31, Anion Gap 6, Blood Urea Nitrogen 13, Creatinine 0.6, Estimat 

Glomerular Filtration Rate > 60, Glucose Level 126H, Calcium Level 9.7, Total 

Bilirubin 0.9, Aspartate Amino Transf (AST/SGOT) 35, Alanine Aminotransferase (

ALT/SGPT) 61, Alkaline Phosphatase 60, Total Protein 6.6, Albumin 2.8L, 

Globulin 3.8, Albumin/Globulin Ratio 0.7L


Height (Feet):  5


Height (Inches):  5.00


Weight (Pounds):  123


Cardiovascular:  normal rate


Respiratory/Chest:  lungs clear











Melia Coombs MD Feb 9, 2019 18:03

## 2019-02-09 NOTE — GENERAL PROGRESS NOTE
Assessment/Plan


Problem List:  


(1) Acute metabolic encephalopathy


ICD Codes:  G93.41 - Metabolic encephalopathy


SNOMED:  33593153, 076394710


Assessment/Plan


haldol im


ativan im


cont bilat restraints





Subjective


Neurologic/Psychiatric:  Reports: anxiety, depressed, emotional problems


Allergies:  


Coded Allergies:  


     No Known Allergies (Unverified , 2/5/19)





Objective





Last 24 Hour Vital Signs








  Date Time  Temp Pulse Resp B/P (MAP) Pulse Ox O2 Delivery O2 Flow Rate FiO2


 


2/9/19 20:00  93      


 


2/9/19 19:16  88 18  100 Nasal Cannula 3.0 32


 


2/9/19 19:04  93 20  98 Nasal Cannula 2.0 28


 


2/9/19 16:26  81      


 


2/9/19 16:00 97.7 88 20 133/84 (100) 100   


 


2/9/19 16:00       3.0 


 


2/9/19 16:00      Bi-pap  


 


2/9/19 13:40      Nasal Cannula 2.0 


 


2/9/19 13:40      Nasal Cannula 2.0 


 


2/9/19 12:00      Bi-pap  


 


2/9/19 12:00 97.5 80 20 133/84 (100) 96   


 


2/9/19 12:00       3.0 


 


2/9/19 11:32  86      


 


2/9/19 09:44  83      


 


2/9/19 08:00      Bi-pap  


 


2/9/19 08:00 97.5 95 18 128/68 (88) 96   


 


2/9/19 08:00       3.0 


 


2/9/19 07:35  81 18  97 Nasal Cannula 3.0 32


 


2/9/19 07:30  81 20  96  3.0 32


 


2/9/19 07:30  81 20  99 Nasal Cannula 2.0 28


 


2/9/19 04:30  74 20  95  3.0 32


 


2/9/19 04:00 97.6 68 16 120/68 (85) 98   


 


2/9/19 04:00       3.0 


 


2/9/19 04:00      Bi-pap  


 


2/9/19 04:00  67      


 


2/9/19 03:10  73 24  97 Facial  30


 


2/9/19 01:26  77 17  97 Bi-pap  30


 


2/9/19 01:16  73 19  98 Nasal Cannula 2.0 28


 


2/9/19 01:15  73 19  98 Facial  40


 


2/9/19 00:00       2.0 


 


2/9/19 00:00      Bi-pap  


 


2/9/19 00:00  64      


 


2/9/19 00:00 97.7 64 19 118/72 (87) 98   


 


2/8/19 23:15  71 18  95 Facial  40

















Intake and Output  


 


 2/8/19 2/9/19





 19:00 07:00


 


Intake Total 500 ml 


 


Balance 500 ml 


 


  


 


Intake Oral 500 ml 


 


# Voids 2 2


 


# Bowel Movements 1 3








Laboratory Tests


2/9/19 04:40: 


White Blood Count 8.4, Red Blood Count 4.43, Hemoglobin 12.8, Hematocrit 38.7, 

Mean Corpuscular Volume 87, Mean Corpuscular Hemoglobin 28.8, Mean Corpuscular 

Hemoglobin Concent 33.0, Red Cell Distribution Width 13.0, Platelet Count 172, 

Mean Platelet Volume 7.2, Neutrophils (%) (Auto) 84.5H, Lymphocytes (%) (Auto) 

9.9L, Monocytes (%) (Auto) 5.4, Eosinophils (%) (Auto) 0.0, Basophils (%) (Auto

) 0.2, Sodium Level 139, Potassium Level 3.9, Chloride Level 102, Carbon 

Dioxide Level 31, Anion Gap 6, Blood Urea Nitrogen 13, Creatinine 0.6, Estimat 

Glomerular Filtration Rate > 60, Glucose Level 126H, Calcium Level 9.7, Total 

Bilirubin 0.9, Aspartate Amino Transf (AST/SGOT) 35, Alanine Aminotransferase (

ALT/SGPT) 61, Alkaline Phosphatase 60, Total Protein 6.6, Albumin 2.8L, 

Globulin 3.8, Albumin/Globulin Ratio 0.7L


Height (Feet):  5


Height (Inches):  5.00


Weight (Pounds):  123


General Appearance:  alert, confused, agitated











Eddie Irizarry MD Feb 9, 2019 21:51

## 2019-02-09 NOTE — NUR
NURSE NOTES:

Received pt in no distress; awake; alert but confused and combative. Uncooperative, 
suspicious and aggressive. On bed and bilateral soft wrist restraints on. Refused to have 
vital signs taken and refused to have the Bipap on. No respiratory/breathing difficulty; 
Maintains O2 vial NC at 3l/m. HL on RFA intact. ST on the monitor. Right eye reddish; skin 
intact. Will attempt to approach and do interventions later when pt calms down.

## 2019-02-09 NOTE — NUR
NURSE NOTES:



Received report from NASH Do RN. Patient is awake in bed watching television, A/O x2. No 
s/s of acute distress noted. Sinus rhythm on cardiac monitor. Receiving 2L via NC and 
saturating well. Left forearm 22g IV saline lock, intact and patent. Bed locked in lowest 
position with side rails up x2. Call light left within reach. Will continue to monitor.

## 2019-02-09 NOTE — NUR
NURSE NOTES:



Patient removed her leads from cardiac monitor, gown, and IV. IV catheter is intact. Former 
IV site is asymptomatic. Patient back on restraints. Bath given, bedding changed, new gown 
on, cardiac monitor reading sinus rhythm. No s/s of acute distress. Will continue to 
monitor.

## 2019-02-09 NOTE — PULMONOLOGY PROGRESS NOTE
Assessment/Plan


Assessment/Plan


Pulmonary Progress Note





Plan


Problems:  


(1) COPD exacerbation


(2) Transaminitis


Assessment/Plan


Problem List:


1. Acute on chronic hypoxemic and hypercapnic respiratory failure


2. COPD w/ acute exacerbation


3. Weakness/fatigue


4. Transaminitis - BETTER





Plan: 


-bipap 12/5 PRN and qHS


-monitor ABG


-Titrate down FiO2 to keep SaO2 > 90%


-prednisone 60 mg (D2)


-Optimize pulmonary hygiene/mobilize as tolerated


-RTC and PRN DUOnebs   


-abx: ceftriaxone/azithro


-f/u cultures   


-monitor mental status


-Aspiration precautions


-DVT Px: Hep SQ





Subjective


Allergies:  


Coded Allergies:  


     No Known Allergies (Unverified , 2/5/19)


Subjective


AFVSS BiPAP -> 3L


Feels better


Less cough less SOB some wheezing no F/C no CP


CXR possible infiltrate RUL





Objective





Vital Signs Noted





General Appearance:  no acute distress, other - disheveled


HEENT:  normocephalic, atraumatic


Respiratory/Chest:  chest wall non-tender, no respiratory distress, rhonchi - 

scattered


Cardiovascular:  normal peripheral pulses, tachycardia


Abdomen:  normal bowel sounds, soft, non tender, no organomegaly, non distended

, no mass


Extremities:  no cyanosis, no clubbing, no edema





Microbiology








 Date/Time


Source Procedure


Growth Status


 


 


 2/5/19 15:35


Blood Blood Culture - Preliminary


NO GROWTH AFTER 24 HOURS Resulted


 


 2/5/19 15:20


Blood Blood Culture - Preliminary


NO GROWTH AFTER 24 HOURS Resulted


 


 2/5/19 15:20


Nasal Nares Influenza Types A,B Antigen (DANIELA) - Final Complete


 


 2/5/19 17:00


Rectum - Preliminary Resulted








Laboratory Tests


2/6/19 19:00: 


Arterial Blood pH 7.247*L, Arterial Blood Partial Pressure CO2 80.8*H, Arterial 

Blood Partial Pressure O2 152.2H, Arterial Blood HCO3 34.4H, Arterial Blood 

Oxygen Saturation 98.5, Arterial Blood Base Excess 4.4H, Fredy Test Positive


2/7/19 04:00: 


Arterial Blood pH 7.422, Arterial Blood Partial Pressure CO2 59.2*H, Arterial 

Blood Partial Pressure O2 62.8L, Arterial Blood HCO3 37.7H, Arterial Blood 

Oxygen Saturation 92.2L, Arterial Blood Base Excess 11.0*H, Fredy Test Positive


2/7/19 04:10: 


White Blood Count 15.7#H, Red Blood Count 4.24, Hemoglobin 12.3, Hematocrit 37.6

, Mean Corpuscular Volume 89, Mean Corpuscular Hemoglobin 29.1, Mean 

Corpuscular Hemoglobin Concent 32.8, Red Cell Distribution Width 13.3, Platelet 

Count 185, Mean Platelet Volume 6.6, Neutrophils (%) (Auto) , Lymphocytes (%) (

Auto) , Monocytes (%) (Auto) , Eosinophils (%) (Auto) , Basophils (%) (Auto) , 

Differential Total Cells Counted 100, Neutrophils % (Manual) 89H, Lymphocytes % 

(Manual) 10L, Monocytes % (Manual) 1, Eosinophils % (Manual) 0, Basophils % (

Manual) 0, Band Neutrophils 0, Platelet Estimate Adequate, Platelet Morphology 

Normal, Red Blood Cell Morphology Normal, Sodium Level 143, Potassium Level 4.0

, Chloride Level 105, Carbon Dioxide Level 34H, Anion Gap 4L, Blood Urea 

Nitrogen 18, Creatinine 0.7, Estimat Glomerular Filtration Rate > 60, Glucose 

Level 94, Calcium Level 8.9, Total Bilirubin 0.3, Aspartate Amino Transf (AST/

SGOT) 53H, Alanine Aminotransferase (ALT/SGPT) 64, Alkaline Phosphatase 58, 

Total Protein 5.8L, Albumin 2.6L, Globulin 3.2, Albumin/Globulin Ratio 0.8L, 

Hepatitis A IgM Antibody [Pending], Hepatitis B Surface Antigen [Pending], 

Hepatitis B Core IgM Antibody [Pending], Hepatitis C Antibody [Pending]





Current Medications








 Medications


  (Trade)  Dose


 Ordered  Sig/Mona


 Route


 PRN Reason  Start Time


 Stop Time Status Last Admin


Dose Admin


 


 Albuterol/


 Ipratropium


  (Albuterol/


 Ipratropium)  3 ml  Q4H  PRN


 HHN


 Shortness of Breath  2/5/19 20:45


 2/10/19 20:44   


 


 


 Albuterol/


 Ipratropium


  (Albuterol/


 Ipratropium)  3 ml  Q6HRT


 HHN


   2/6/19 19:00


 2/11/19 18:59  2/7/19 13:50


 


 


 Azithromycin


  (Zithromax)  250 mg  DAILY


 ORAL


   2/7/19 09:00


 2/14/19 08:59  2/7/19 08:42


 


 


 Ceftriaxone


 Sodium 1 gm/


 Dextrose  55 ml @ 


 110 mls/hr  Q24H


 IVPB


   2/6/19 14:30


 2/13/19 14:29  2/7/19 15:34


 


 


 Famotidine


  (Pepcid)  20 mg  DAILY


 ORAL


   2/8/19 09:00


 3/10/19 08:59   


 


 


 Ibuprofen


  (Advil)  400 mg  Q6H  PRN


 ORAL


 For Pain  2/5/19 20:45


 3/7/19 20:44   


 











Subjective


ROS Limited/Unobtainable:  No


Allergies:  


Coded Allergies:  


     No Known Allergies (Unverified , 2/5/19)





Objective





Last 24 Hour Vital Signs








  Date Time  Temp Pulse Resp B/P (MAP) Pulse Ox O2 Delivery O2 Flow Rate FiO2


 


2/9/19 08:00 97.5 95 18 128/68 (88) 96   


 


2/9/19 07:35  81 18  97 Nasal Cannula 3.0 32


 


2/9/19 07:30  81 20  96  3.0 32


 


2/9/19 07:30  81 20  99 Nasal Cannula 2.0 28


 


2/9/19 04:30  74 20  95  3.0 32


 


2/9/19 04:00 97.6 68 16 120/68 (85) 98   


 


2/9/19 04:00       3.0 


 


2/9/19 04:00      Bi-pap  


 


2/9/19 04:00  67      


 


2/9/19 03:10  73 24  97 Facial  30


 


2/9/19 01:26  77 17  97 Bi-pap  30


 


2/9/19 01:16  73 19  98 Nasal Cannula 2.0 28


 


2/9/19 01:15  73 19  98 Facial  40


 


2/9/19 00:00       2.0 


 


2/9/19 00:00      Bi-pap  


 


2/9/19 00:00  64      


 


2/9/19 00:00 97.7 64 19 118/72 (87) 98   


 


2/8/19 23:15  71 18  95 Facial  40


 


2/8/19 20:00      Bi-pap  


 


2/8/19 20:00       2.0 


 


2/8/19 20:00 98.9 89 20 124/63 (83) 93   


 


2/8/19 20:00  110      


 


2/8/19 19:18       3.0 


 


2/8/19 19:07  92 20  98 Nasal Cannula 2.0 28


 


2/8/19 18:58  88 20  94 Nasal Cannula 2.0 28


 


2/8/19 17:12  93 20  93   


 


2/8/19 16:01  78      


 


2/8/19 16:00      Bi-pap  


 


2/8/19 16:00       2.0 


 


2/8/19 16:00 98.4 85 20 120/76 (91) 96   


 


2/8/19 15:15  82 22  95 Facial  45


 


2/8/19 13:46  100 20  99 Venturi Mask 14.0 55


 


2/8/19 13:34  100 20  93 Nasal Cannula 3.0 32


 


2/8/19 13:10  90 22  93   


 


2/8/19 12:00      Bi-pap  


 


2/8/19 12:00  96      


 


2/8/19 12:00 97.8 87 18 130/68 (88) 99   


 


2/8/19 12:00        32


 


2/8/19 11:20  77 22  99   


 


2/8/19 09:16  89 20  98   

















Intake and Output  


 


 2/8/19 2/9/19





 19:00 07:00


 


Intake Total 500 ml 


 


Balance 500 ml 


 


  


 


Intake Oral 500 ml 


 


# Voids 2 2


 


# Bowel Movements 1 3








Laboratory Tests


2/9/19 04:40: 


White Blood Count 8.4, Red Blood Count 4.43, Hemoglobin 12.8, Hematocrit 38.7, 

Mean Corpuscular Volume 87, Mean Corpuscular Hemoglobin 28.8, Mean Corpuscular 

Hemoglobin Concent 33.0, Red Cell Distribution Width 13.0, Platelet Count 172, 

Mean Platelet Volume 7.2, Neutrophils (%) (Auto) 84.5H, Lymphocytes (%) (Auto) 

9.9L, Monocytes (%) (Auto) 5.4, Eosinophils (%) (Auto) 0.0, Basophils (%) (Auto

) 0.2, Sodium Level 139, Potassium Level 3.9, Chloride Level 102, Carbon 

Dioxide Level 31, Anion Gap 6, Blood Urea Nitrogen 13, Creatinine 0.6, Estimat 

Glomerular Filtration Rate > 60, Glucose Level 126H, Calcium Level 9.7, Total 

Bilirubin 0.9, Aspartate Amino Transf (AST/SGOT) 35, Alanine Aminotransferase (

ALT/SGPT) 61, Alkaline Phosphatase 60, Total Protein 6.6, Albumin 2.8L, 

Globulin 3.8, Albumin/Globulin Ratio 0.7L





Current Medications








 Medications


  (Trade)  Dose


 Ordered  Sig/Mona


 Route


 PRN Reason  Start Time


 Stop Time Status Last Admin


Dose Admin


 


 Albuterol/


 Ipratropium


  (Albuterol/


 Ipratropium)  3 ml  Q4H  PRN


 HHN


 Shortness of Breath  2/5/19 20:45


 2/10/19 20:44   


 


 


 Albuterol/


 Ipratropium


  (Albuterol/


 Ipratropium)  3 ml  Q6HRT


 HHN


   2/6/19 19:00


 2/11/19 18:59  2/9/19 07:30


 


 


 Azithromycin


  (Zithromax)  250 mg  DAILY


 ORAL


   2/7/19 09:00


 2/14/19 08:59  2/9/19 08:45


 


 


 Ceftriaxone


 Sodium 1 gm/


 Dextrose  55 ml @ 


 110 mls/hr  Q24H


 IVPB


   2/6/19 14:30


 2/13/19 14:29  2/8/19 13:54


 


 


 Famotidine


  (Pepcid)  20 mg  DAILY


 ORAL


   2/8/19 09:00


 3/10/19 08:59  2/9/19 08:45


 


 


 Haloperidol


 Lactate


  (Haldol)  5 mg  Q6H  PRN


 IM


 Agitation  2/8/19 20:00


 3/10/19 19:59  2/9/19 08:51


 


 


 Heparin Sodium


  (Porcine)


  (Heparin 5000


 units/ml)  5,000 units  EVERY 12  HOURS


 SUBQ


   2/7/19 21:00


 3/9/19 20:59  2/9/19 08:46


 


 


 Ibuprofen


  (Advil)  400 mg  Q6H  PRN


 ORAL


 For Pain  2/5/19 20:45


 3/7/19 20:44   


 


 


 Lorazepam


  (Ativan 2mg/ml


 1ml)  1 mg  Q4H  PRN


 IM


 For Anxiety  2/8/19 20:00


 2/15/19 19:59   


 


 


 Prednisone


  (predniSONE)  60 mg  Q24H


 ORAL


   2/8/19 18:00


 3/10/19 17:59  2/8/19 17:25


 


 


 Pyrethrins/


 Piperonyl Butoxide


  (Lice Treatment


 Soln)  1 applic  ONCE  ONCE


 TOPIC


   2/9/19 09:15


 2/9/19 09:16 Lenny Pabon MD Feb 9, 2019 09:14

## 2019-02-09 NOTE — NUR
CASE MANAGEMENT: REVIEW





SI: COPD EXACERBATION . PNA 

T 97.5 HR 95 RR 18 /68 SAT 96% BIPAP FIO2 40

NEUT 84.5 LYMPH 9.9 ALBUMIN 2.8 





IS: PREDNISONE PO QD

HEPARIN SQ Q12HR

AZITHROMYCIN PO QD

ALBUTEROL HHN Q6HR 

CEFTRIAXONE IV Q24HR





***STEP DOWN UNIT STATUR***

DCP: PATIENT REPORTS BEING HOMELESS

## 2019-02-09 NOTE — GI PROGRESS NOTE
Assessment/Plan


Problems:  


(1) Transaminitis


ICD Codes:  R74.0 - Nonspecific elevation of levels of transaminase and lactic 

acid dehydrogenase [LDH]


SNOMED:  019927522, [id number]


(2) COPD exacerbation


ICD Codes:  J44.1 - Chronic obstructive pulmonary disease with (acute) 

exacerbation


SNOMED:  960453157


Status:  unchanged


Status Narrative


Discussed with Dr. Ndiaye


Assessment/Plan


Transaminitis resolving


Patient now off BiPAP


Hepatitis panel reviewed.  Patient positive for hepatitis C





Symptomatic treatment


okay to advance to regular diet


Zofran as needed


ppi


fu labs


Outpatient GI procedures, outpatient hepatitis C tx





The patient was seen and examined at bedside and all new and available data was 

reviewed in the patients chart. I agree with the above findings, impression 

and plan.  (Patient seen earlier today. Signature stamp does not reflect 

patient encounter time.). - Juan Miguel Ndiaye MD





Subjective


Subjective


Denies any abdominal pain


Denies any nausea or vomiting


Denies any constipation or diarrhea


Tolerating food





Objective





Last 24 Hour Vital Signs








  Date Time  Temp Pulse Resp B/P (MAP) Pulse Ox O2 Delivery O2 Flow Rate FiO2


 


2/9/19 13:40      Nasal Cannula 2.0 


 


2/9/19 13:40      Nasal Cannula 2.0 


 


2/9/19 12:00      Bi-pap  


 


2/9/19 12:00 97.5 80 20 133/84 (100) 96   


 


2/9/19 12:00       3.0 


 


2/9/19 11:32  86      


 


2/9/19 09:44  83      


 


2/9/19 08:00      Bi-pap  


 


2/9/19 08:00 97.5 95 18 128/68 (88) 96   


 


2/9/19 08:00       3.0 


 


2/9/19 07:35  81 18  97 Nasal Cannula 3.0 32


 


2/9/19 07:30  81 20  96  3.0 32


 


2/9/19 07:30  81 20  99 Nasal Cannula 2.0 28


 


2/9/19 04:30  74 20  95  3.0 32


 


2/9/19 04:00 97.6 68 16 120/68 (85) 98   


 


2/9/19 04:00       3.0 


 


2/9/19 04:00      Bi-pap  


 


2/9/19 04:00  67      


 


2/9/19 03:10  73 24  97 Facial  30


 


2/9/19 01:26  77 17  97 Bi-pap  30


 


2/9/19 01:16  73 19  98 Nasal Cannula 2.0 28


 


2/9/19 01:15  73 19  98 Facial  40


 


2/9/19 00:00       2.0 


 


2/9/19 00:00      Bi-pap  


 


2/9/19 00:00  64      


 


2/9/19 00:00 97.7 64 19 118/72 (87) 98   


 


2/8/19 23:15  71 18  95 Facial  40


 


2/8/19 20:00      Bi-pap  


 


2/8/19 20:00       2.0 


 


2/8/19 20:00 98.9 89 20 124/63 (83) 93   


 


2/8/19 20:00  110      


 


2/8/19 19:18       3.0 


 


2/8/19 19:07  92 20  98 Nasal Cannula 2.0 28


 


2/8/19 18:58  88 20  94 Nasal Cannula 2.0 28


 


2/8/19 17:12  93 20  93   

















Intake and Output  


 


 2/8/19 2/9/19





 19:00 07:00


 


Intake Total 500 ml 


 


Balance 500 ml 


 


  


 


Intake Oral 500 ml 


 


# Voids 2 2


 


# Bowel Movements 1 3











Laboratory Tests








Test


  2/9/19


04:40


 


White Blood Count


  8.4 K/UL


(4.8-10.8)


 


Red Blood Count


  4.43 M/UL


(4.20-5.40)


 


Hemoglobin


  12.8 G/DL


(12.0-16.0)


 


Hematocrit


  38.7 %


(37.0-47.0)


 


Mean Corpuscular Volume 87 FL (80-99)  


 


Mean Corpuscular Hemoglobin


  28.8 PG


(27.0-31.0)


 


Mean Corpuscular Hemoglobin


Concent 33.0 G/DL


(32.0-36.0)


 


Red Cell Distribution Width


  13.0 %


(11.6-14.8)


 


Platelet Count


  172 K/UL


(150-450)


 


Mean Platelet Volume


  7.2 FL


(6.5-10.1)


 


Neutrophils (%) (Auto)


  84.5 %


(45.0-75.0)  H


 


Lymphocytes (%) (Auto)


  9.9 %


(20.0-45.0)  L


 


Monocytes (%) (Auto)


  5.4 %


(1.0-10.0)


 


Eosinophils (%) (Auto)


  0.0 %


(0.0-3.0)


 


Basophils (%) (Auto)


  0.2 %


(0.0-2.0)


 


Sodium Level


  139 MMOL/L


(136-145)


 


Potassium Level


  3.9 MMOL/L


(3.5-5.1)


 


Chloride Level


  102 MMOL/L


()


 


Carbon Dioxide Level


  31 MMOL/L


(21-32)


 


Anion Gap


  6 mmol/L


(5-15)


 


Blood Urea Nitrogen


  13 mg/dL


(7-18)


 


Creatinine


  0.6 MG/DL


(0.55-1.30)


 


Estimat Glomerular Filtration


Rate > 60 mL/min


(>60)


 


Glucose Level


  126 MG/DL


()  H


 


Calcium Level


  9.7 MG/DL


(8.5-10.1)


 


Total Bilirubin


  0.9 MG/DL


(0.2-1.0)


 


Aspartate Amino Transf


(AST/SGOT) 35 U/L (15-37)


 


 


Alanine Aminotransferase


(ALT/SGPT) 61 U/L (12-78)


 


 


Alkaline Phosphatase


  60 U/L


()


 


Total Protein


  6.6 G/DL


(6.4-8.2)


 


Albumin


  2.8 G/DL


(3.4-5.0)  L


 


Globulin 3.8 g/dL  


 


Albumin/Globulin Ratio


  0.7 (1.0-2.7)


L








Height (Feet):  5


Height (Inches):  5.00


Weight (Pounds):  123


General Appearance:  no apparent distress, alert, confused, agitated


Cardiovascular:  normal rate


Respiratory/Chest:  normal breath sounds, no respiratory distress


Abdominal Exam:  normal bowel sounds, non tender, soft


Extremities:  non-tender











Stacy Hernandez NP Feb 9, 2019 16:22

## 2019-02-09 NOTE — INFECTIOUS DISEASES PROG NOTE
Assessment/Plan


Assessment/Plan


antibiotics : ceftriaxone, azithromycin





A


1. pneumonia


2. COPD exacerbation


3. leucocytosis resolved





P


1. continue ceftriaxone, azithromycin


2. will follow up cultures





Subjective


Constitutional:  Denies: fever, chills


Respiratory:  Reports: shortness of breath, dry cough


Gastrointestinal/Abdominal:  Denies: nausea, vomiting, diarrhea


Musculoskeletal:  Denies: pain


Allergies:  


Coded Allergies:  


     No Known Allergies (Unverified , 2/5/19)





Objective


Vital Signs





Last 24 Hour Vital Signs








  Date Time  Temp Pulse Resp B/P (MAP) Pulse Ox O2 Delivery O2 Flow Rate FiO2


 


2/9/19 09:44  83      


 


2/9/19 08:00 97.5 95 18 128/68 (88) 96   


 


2/9/19 07:35  81 18  97 Nasal Cannula 3.0 32


 


2/9/19 07:30  81 20  96  3.0 32


 


2/9/19 07:30  81 20  99 Nasal Cannula 2.0 28


 


2/9/19 04:30  74 20  95  3.0 32


 


2/9/19 04:00 97.6 68 16 120/68 (85) 98   


 


2/9/19 04:00       3.0 


 


2/9/19 04:00      Bi-pap  


 


2/9/19 04:00  67      


 


2/9/19 03:10  73 24  97 Facial  30


 


2/9/19 01:26  77 17  97 Bi-pap  30


 


2/9/19 01:16  73 19  98 Nasal Cannula 2.0 28


 


2/9/19 01:15  73 19  98 Facial  40


 


2/9/19 00:00       2.0 


 


2/9/19 00:00      Bi-pap  


 


2/9/19 00:00  64      


 


2/9/19 00:00 97.7 64 19 118/72 (87) 98   


 


2/8/19 23:15  71 18  95 Facial  40


 


2/8/19 20:00      Bi-pap  


 


2/8/19 20:00       2.0 


 


2/8/19 20:00 98.9 89 20 124/63 (83) 93   


 


2/8/19 20:00  110      


 


2/8/19 19:18       3.0 


 


2/8/19 19:07  92 20  98 Nasal Cannula 2.0 28


 


2/8/19 18:58  88 20  94 Nasal Cannula 2.0 28


 


2/8/19 17:12  93 20  93   


 


2/8/19 16:01  78      


 


2/8/19 16:00      Bi-pap  


 


2/8/19 16:00       2.0 


 


2/8/19 16:00 98.4 85 20 120/76 (91) 96   


 


2/8/19 15:15  82 22  95 Facial  45


 


2/8/19 13:46  100 20  99 Venturi Mask 14.0 55


 


2/8/19 13:34  100 20  93 Nasal Cannula 3.0 32


 


2/8/19 13:10  90 22  93   


 


2/8/19 12:00      Bi-pap  


 


2/8/19 12:00  96      


 


2/8/19 12:00 97.8 87 18 130/68 (88) 99   


 


2/8/19 12:00        32


 


2/8/19 11:20  77 22  99   








Height (Feet):  5


Height (Inches):  5.00


Weight (Pounds):  123


Respiratory/Chest:  lungs clear


Cardiovascular:  normal rate, regular rhythm, no gallop/murmur


Abdomen:  soft, non tender


Extremities:  no edema





Laboratory Tests








Test


  2/9/19


04:40


 


White Blood Count


  8.4 K/UL


(4.8-10.8)


 


Red Blood Count


  4.43 M/UL


(4.20-5.40)


 


Hemoglobin


  12.8 G/DL


(12.0-16.0)


 


Hematocrit


  38.7 %


(37.0-47.0)


 


Mean Corpuscular Volume 87 FL (80-99)  


 


Mean Corpuscular Hemoglobin


  28.8 PG


(27.0-31.0)


 


Mean Corpuscular Hemoglobin


Concent 33.0 G/DL


(32.0-36.0)


 


Red Cell Distribution Width


  13.0 %


(11.6-14.8)


 


Platelet Count


  172 K/UL


(150-450)


 


Mean Platelet Volume


  7.2 FL


(6.5-10.1)


 


Neutrophils (%) (Auto)


  84.5 %


(45.0-75.0)  H


 


Lymphocytes (%) (Auto)


  9.9 %


(20.0-45.0)  L


 


Monocytes (%) (Auto)


  5.4 %


(1.0-10.0)


 


Eosinophils (%) (Auto)


  0.0 %


(0.0-3.0)


 


Basophils (%) (Auto)


  0.2 %


(0.0-2.0)


 


Sodium Level


  139 MMOL/L


(136-145)


 


Potassium Level


  3.9 MMOL/L


(3.5-5.1)


 


Chloride Level


  102 MMOL/L


()


 


Carbon Dioxide Level


  31 MMOL/L


(21-32)


 


Anion Gap


  6 mmol/L


(5-15)


 


Blood Urea Nitrogen


  13 mg/dL


(7-18)


 


Creatinine


  0.6 MG/DL


(0.55-1.30)


 


Estimat Glomerular Filtration


Rate > 60 mL/min


(>60)


 


Glucose Level


  126 MG/DL


()  H


 


Calcium Level


  9.7 MG/DL


(8.5-10.1)


 


Total Bilirubin


  0.9 MG/DL


(0.2-1.0)


 


Aspartate Amino Transf


(AST/SGOT) 35 U/L (15-37)


 


 


Alanine Aminotransferase


(ALT/SGPT) 61 U/L (12-78)


 


 


Alkaline Phosphatase


  60 U/L


()


 


Total Protein


  6.6 G/DL


(6.4-8.2)


 


Albumin


  2.8 G/DL


(3.4-5.0)  L


 


Globulin 3.8 g/dL  


 


Albumin/Globulin Ratio


  0.7 (1.0-2.7)


L











Current Medications








 Medications


  (Trade)  Dose


 Ordered  Sig/Mona


 Route


 PRN Reason  Start Time


 Stop Time Status Last Admin


Dose Admin


 


 Albuterol/


 Ipratropium


  (Albuterol/


 Ipratropium)  3 ml  Q4H  PRN


 HHN


 Shortness of Breath  2/5/19 20:45


 2/10/19 20:44   


 


 


 Albuterol/


 Ipratropium


  (Albuterol/


 Ipratropium)  3 ml  Q6HRT


 HHN


   2/6/19 19:00


 2/11/19 18:59  2/9/19 07:30


 


 


 Azithromycin


  (Zithromax)  250 mg  DAILY


 ORAL


   2/7/19 09:00


 2/14/19 08:59  2/9/19 08:45


 


 


 Ceftriaxone


 Sodium 1 gm/


 Dextrose  55 ml @ 


 110 mls/hr  Q24H


 IVPB


   2/6/19 14:30


 2/13/19 14:29  2/8/19 13:54


 


 


 Famotidine


  (Pepcid)  20 mg  DAILY


 ORAL


   2/8/19 09:00


 3/10/19 08:59  2/9/19 08:45


 


 


 Haloperidol


 Lactate


  (Haldol)  5 mg  Q6H  PRN


 IM


 Agitation  2/8/19 20:00


 3/10/19 19:59  2/9/19 08:51


 


 


 Heparin Sodium


  (Porcine)


  (Heparin 5000


 units/ml)  5,000 units  EVERY 12  HOURS


 SUBQ


   2/7/19 21:00


 3/9/19 20:59  2/9/19 08:46


 


 


 Ibuprofen


  (Advil)  400 mg  Q6H  PRN


 ORAL


 For Pain  2/5/19 20:45


 3/7/19 20:44   


 


 


 Lorazepam


  (Ativan 2mg/ml


 1ml)  1 mg  Q4H  PRN


 IM


 For Anxiety  2/8/19 20:00


 2/15/19 19:59   


 


 


 Prednisone


  (predniSONE)  60 mg  Q24H


 ORAL


   2/8/19 18:00


 3/10/19 17:59  2/8/19 17:25


 

















Kaia Becker MD Feb 9, 2019 10:44

## 2019-02-10 VITALS — DIASTOLIC BLOOD PRESSURE: 77 MMHG | SYSTOLIC BLOOD PRESSURE: 127 MMHG

## 2019-02-10 VITALS — DIASTOLIC BLOOD PRESSURE: 73 MMHG | SYSTOLIC BLOOD PRESSURE: 124 MMHG

## 2019-02-10 VITALS — SYSTOLIC BLOOD PRESSURE: 140 MMHG | DIASTOLIC BLOOD PRESSURE: 82 MMHG

## 2019-02-10 VITALS — DIASTOLIC BLOOD PRESSURE: 74 MMHG | SYSTOLIC BLOOD PRESSURE: 122 MMHG

## 2019-02-10 VITALS — SYSTOLIC BLOOD PRESSURE: 137 MMHG | DIASTOLIC BLOOD PRESSURE: 82 MMHG

## 2019-02-10 VITALS — DIASTOLIC BLOOD PRESSURE: 63 MMHG | SYSTOLIC BLOOD PRESSURE: 104 MMHG

## 2019-02-10 LAB
ADD MANUAL DIFF: NO
ANION GAP SERPL CALC-SCNC: 7 MMOL/L (ref 5–15)
BASOPHILS NFR BLD AUTO: 0.4 % (ref 0–2)
BUN SERPL-MCNC: 16 MG/DL (ref 7–18)
CALCIUM SERPL-MCNC: 9.5 MG/DL (ref 8.5–10.1)
CHLORIDE SERPL-SCNC: 105 MMOL/L (ref 98–107)
CO2 SERPL-SCNC: 29 MMOL/L (ref 21–32)
CREAT SERPL-MCNC: 0.7 MG/DL (ref 0.55–1.3)
EOSINOPHIL NFR BLD AUTO: 0.1 % (ref 0–3)
ERYTHROCYTE [DISTWIDTH] IN BLOOD BY AUTOMATED COUNT: 13.2 % (ref 11.6–14.8)
HCT VFR BLD CALC: 39.6 % (ref 37–47)
HGB BLD-MCNC: 12.8 G/DL (ref 12–16)
LYMPHOCYTES NFR BLD AUTO: 14.8 % (ref 20–45)
MCV RBC AUTO: 86 FL (ref 80–99)
MONOCYTES NFR BLD AUTO: 3.8 % (ref 1–10)
NEUTROPHILS NFR BLD AUTO: 80.9 % (ref 45–75)
PLATELET # BLD: 175 K/UL (ref 150–450)
POTASSIUM SERPL-SCNC: 4.5 MMOL/L (ref 3.5–5.1)
RBC # BLD AUTO: 4.58 M/UL (ref 4.2–5.4)
SODIUM SERPL-SCNC: 141 MMOL/L (ref 136–145)
WBC # BLD AUTO: 4.4 K/UL (ref 4.8–10.8)

## 2019-02-10 RX ADMIN — HEPARIN SODIUM SCH UNITS: 5000 INJECTION INTRAVENOUS; SUBCUTANEOUS at 21:31

## 2019-02-10 RX ADMIN — IPRATROPIUM BROMIDE AND ALBUTEROL SULFATE SCH ML: .5; 3 SOLUTION RESPIRATORY (INHALATION) at 01:10

## 2019-02-10 RX ADMIN — HEPARIN SODIUM SCH UNITS: 5000 INJECTION INTRAVENOUS; SUBCUTANEOUS at 09:31

## 2019-02-10 RX ADMIN — AZITHROMYCIN DIHYDRATE SCH MG: 250 TABLET, FILM COATED ORAL at 09:28

## 2019-02-10 RX ADMIN — IPRATROPIUM BROMIDE AND ALBUTEROL SULFATE SCH ML: .5; 3 SOLUTION RESPIRATORY (INHALATION) at 19:06

## 2019-02-10 RX ADMIN — SODIUM CHLORIDE SCH MLS/HR: 0.9 INJECTION INTRAVENOUS at 15:34

## 2019-02-10 RX ADMIN — IPRATROPIUM BROMIDE AND ALBUTEROL SULFATE SCH ML: .5; 3 SOLUTION RESPIRATORY (INHALATION) at 13:20

## 2019-02-10 RX ADMIN — IPRATROPIUM BROMIDE AND ALBUTEROL SULFATE SCH ML: .5; 3 SOLUTION RESPIRATORY (INHALATION) at 07:05

## 2019-02-10 NOTE — GENERAL PROGRESS NOTE
Assessment/Plan


Problem List:  


(1) Acute metabolic encephalopathy


ICD Codes:  G93.41 - Metabolic encephalopathy


SNOMED:  [id number], [id number]


Assessment/Plan


haldol im


ativan im


cont bilat restraints





Subjective


Neurologic/Psychiatric:  Reports: anxiety, depressed, emotional problems


Allergies:  


Coded Allergies:  


     No Known Allergies (Unverified , 2/5/19)





Objective





Last 24 Hour Vital Signs








  Date Time  Temp Pulse Resp B/P (MAP) Pulse Ox O2 Delivery O2 Flow Rate FiO2


 


2/10/19 20:00 97.5 81 24 122/74 (90) 97   


 


2/10/19 19:16  92 20  98 Nasal Cannula 3.0 32


 


2/10/19 19:05  90 20  94 Nasal Cannula 3.0 32


 


2/10/19 19:01  95 20   Nasal Cannula 3.0 32


 


2/10/19 16:00 98.2 88 35 137/82 (100) 95   


 


2/10/19 16:00  82      


 


2/10/19 16:00      Bi-pap  


 


2/10/19 16:00       3.0 


 


2/10/19 13:20      Nasal Cannula 2.0 


 


2/10/19 13:20      Nasal Cannula 2.0 


 


2/10/19 12:00       3.0 


 


2/10/19 12:00 97.7 85 22 127/77 (94) 94   


 


2/10/19 12:00      Bi-pap  


 


2/10/19 12:00  79      


 


2/10/19 09:00       3.0 


 


2/10/19 08:00 97.7 113 20 124/73 (90) 94   


 


2/10/19 08:00  95      


 


2/10/19 08:00      Bi-pap  


 


2/10/19 07:05      Nasal Cannula 2.0 


 


2/10/19 07:05      Nasal Cannula 2.0 


 


2/10/19 04:38  68 17  97 Facial  30


 


2/10/19 04:00        30


 


2/10/19 04:00      Bi-pap  


 


2/10/19 04:00  69      


 


2/10/19 04:00 97.5 88 20 140/82 (101) 98   


 


2/10/19 02:49  76 17  98 Facial  30


 


2/10/19 01:20  69 18  99 Bi-pap  30


 


2/10/19 01:10  93 20  98 Bi-pap  30


 


2/10/19 01:10  78 19  98 Facial  30


 


2/10/19 00:00        30


 


2/10/19 00:00 97.7 68 20 104/63 (77) 95   


 


2/10/19 00:00      Bi-pap  


 


2/10/19 00:00  68      


 


2/9/19 23:22  74 19  96 Facial  30

















Intake and Output  


 


 2/9/19 2/10/19





 19:00 07:00


 


Intake Total 555 ml 60 ml


 


Output Total  300 ml


 


Balance 555 ml -240 ml


 


  


 


Intake Oral 500 ml 60 ml


 


IV Total 55 ml 


 


Output Urine Total  300 ml


 


# Voids 2 


 


# Bowel Movements 2 








Laboratory Tests


2/10/19 04:20: 


White Blood Count 4.4L, Red Blood Count 4.58, Hemoglobin 12.8, Hematocrit 39.6, 

Mean Corpuscular Volume 86, Mean Corpuscular Hemoglobin 28.0, Mean Corpuscular 

Hemoglobin Concent 32.4, Red Cell Distribution Width 13.2, Platelet Count 175, 

Mean Platelet Volume 7.2, Neutrophils (%) (Auto) 80.9H, Lymphocytes (%) (Auto) 

14.8L, Monocytes (%) (Auto) 3.8, Eosinophils (%) (Auto) 0.1, Basophils (%) (Auto

) 0.4, Sodium Level 141, Potassium Level 4.5, Chloride Level 105, Carbon 

Dioxide Level 29, Anion Gap 7, Blood Urea Nitrogen 16, Creatinine 0.7, Estimat 

Glomerular Filtration Rate > 60, Glucose Level 149H, Calcium Level 9.5


Height (Feet):  5


Height (Inches):  5.00


Weight (Pounds):  123


General Appearance:  alert, confused, agitated











Eddie Irizarry MD Feb 10, 2019 21:35

## 2019-02-10 NOTE — NUR
NURSE NOTES:Calm, asleep; VSS, no distress. On Bipap at 12/5; fiO2 .30; sats 95%. Non 
behavioral restraints renewed

## 2019-02-10 NOTE — INFECTIOUS DISEASES PROG NOTE
Assessment/Plan


Assessment/Plan


A:


1. COPD exacerbation,


2. Hypercapnic respiratory failure.


3. Homelessness.


4. Elevated transaminase, likely hepatitis C


5. Leukocytosis resolved


6. ? pneumonia


P:


Continue Rocephin & Zithromax





Subjective


ROS Limited/Unobtainable:  Yes


Constitutional:  Reports: no symptoms


Respiratory:  Reports: dry cough


Neurologic:  Reports: confusion, other - on restraint


Allergies:  


Coded Allergies:  


     No Known Allergies (Unverified , 2/5/19)





Objective


Vital Signs





Last 24 Hour Vital Signs








  Date Time  Temp Pulse Resp B/P (MAP) Pulse Ox O2 Delivery O2 Flow Rate FiO2


 


2/10/19 09:00       3.0 


 


2/10/19 08:00 97.7 113 20 124/73 (90) 94   


 


2/10/19 08:00  95      


 


2/10/19 08:00      Bi-pap  


 


2/10/19 07:05      Nasal Cannula 2.0 


 


2/10/19 07:05      Nasal Cannula 2.0 


 


2/10/19 04:38  68 17  97 Facial  30


 


2/10/19 04:00        30


 


2/10/19 04:00      Bi-pap  


 


2/10/19 04:00  69      


 


2/10/19 04:00 97.5 88 20 140/82 (101) 98   


 


2/10/19 02:49  76 17  98 Facial  30


 


2/10/19 01:20  69 18  99 Bi-pap  30


 


2/10/19 01:10  93 20  98 Bi-pap  30


 


2/10/19 01:10  78 19  98 Facial  30


 


2/10/19 00:00        30


 


2/10/19 00:00 97.7 68 20 104/63 (77) 95   


 


2/10/19 00:00      Bi-pap  


 


2/10/19 00:00  68      


 


2/9/19 23:22  74 19  96 Facial  30


 


2/9/19 20:00 96.5 112 20 120/68 (85) 100   


 


2/9/19 20:00      Nasal Cannula 3.0 


 


2/9/19 20:00  93      


 


2/9/19 19:16  88 18  100 Nasal Cannula 3.0 32


 


2/9/19 19:04  93 20  98 Nasal Cannula 2.0 28


 


2/9/19 16:26  81      


 


2/9/19 16:00 97.7 88 20 133/84 (100) 100   


 


2/9/19 16:00       3.0 


 


2/9/19 16:00      Bi-pap  


 


2/9/19 13:40      Nasal Cannula 2.0 


 


2/9/19 13:40      Nasal Cannula 2.0 


 


2/9/19 12:00      Bi-pap  


 


2/9/19 12:00 97.5 80 20 133/84 (100) 96   


 


2/9/19 12:00       3.0 


 


2/9/19 11:32  86      








Height (Feet):  5


Height (Inches):  5.00


Weight (Pounds):  123


HEENT:  other - R subconjunctival bleeding


Respiratory/Chest:  lungs clear, other - oxygen by cannula


Cardiovascular:  normal rate


Abdomen:  soft, non tender


Extremities:  no edema


Neurologic/Psychiatric:  alert, responsive





Laboratory Tests








Test


  2/10/19


04:20


 


White Blood Count


  4.4 K/UL


(4.8-10.8)  L


 


Red Blood Count


  4.58 M/UL


(4.20-5.40)


 


Hemoglobin


  12.8 G/DL


(12.0-16.0)


 


Hematocrit


  39.6 %


(37.0-47.0)


 


Mean Corpuscular Volume 86 FL (80-99)  


 


Mean Corpuscular Hemoglobin


  28.0 PG


(27.0-31.0)


 


Mean Corpuscular Hemoglobin


Concent 32.4 G/DL


(32.0-36.0)


 


Red Cell Distribution Width


  13.2 %


(11.6-14.8)


 


Platelet Count


  175 K/UL


(150-450)


 


Mean Platelet Volume


  7.2 FL


(6.5-10.1)


 


Neutrophils (%) (Auto)


  80.9 %


(45.0-75.0)  H


 


Lymphocytes (%) (Auto)


  14.8 %


(20.0-45.0)  L


 


Monocytes (%) (Auto)


  3.8 %


(1.0-10.0)


 


Eosinophils (%) (Auto)


  0.1 %


(0.0-3.0)


 


Basophils (%) (Auto)


  0.4 %


(0.0-2.0)


 


Sodium Level


  141 MMOL/L


(136-145)


 


Potassium Level


  4.5 MMOL/L


(3.5-5.1)


 


Chloride Level


  105 MMOL/L


()


 


Carbon Dioxide Level


  29 MMOL/L


(21-32)


 


Anion Gap


  7 mmol/L


(5-15)


 


Blood Urea Nitrogen


  16 mg/dL


(7-18)


 


Creatinine


  0.7 MG/DL


(0.55-1.30)


 


Estimat Glomerular Filtration


Rate > 60 mL/min


(>60)


 


Glucose Level


  149 MG/DL


()  H


 


Calcium Level


  9.5 MG/DL


(8.5-10.1)











Current Medications








 Medications


  (Trade)  Dose


 Ordered  Sig/Mnoa


 Route


 PRN Reason  Start Time


 Stop Time Status Last Admin


Dose Admin


 


 Albuterol/


 Ipratropium


  (Albuterol/


 Ipratropium)  3 ml  Q4H  PRN


 HHN


 Shortness of Breath  2/5/19 20:45


 2/10/19 20:44   


 


 


 Albuterol/


 Ipratropium


  (Albuterol/


 Ipratropium)  3 ml  Q6HRT


 HHN


   2/6/19 19:00


 2/11/19 18:59  2/10/19 01:10


 


 


 Azithromycin


  (Zithromax)  250 mg  DAILY


 ORAL


   2/7/19 09:00


 2/14/19 08:59  2/10/19 09:28


 


 


 Ceftriaxone


 Sodium 1 gm/


 Dextrose  55 ml @ 


 110 mls/hr  Q24H


 IVPB


   2/6/19 14:30


 2/13/19 14:29  2/9/19 14:35


 


 


 Famotidine


  (Pepcid)  20 mg  DAILY


 ORAL


   2/8/19 09:00


 3/10/19 08:59  2/10/19 09:28


 


 


 Haloperidol


 Lactate


  (Haldol)  5 mg  Q6H  PRN


 IM


 Agitation  2/8/19 20:00


 3/10/19 19:59  2/9/19 21:20


 


 


 Heparin Sodium


  (Porcine)


  (Heparin 5000


 units/ml)  5,000 units  EVERY 12  HOURS


 SUBQ


   2/7/19 21:00


 3/9/19 20:59  2/10/19 09:31


 


 


 Ibuprofen


  (Advil)  400 mg  Q6H  PRN


 ORAL


 For Pain  2/5/19 20:45


 3/7/19 20:44   


 


 


 Lorazepam


  (Ativan 2mg/ml


 1ml)  1 mg  Q4H  PRN


 IM


 For Anxiety  2/8/19 20:00


 2/15/19 19:59   


 


 


 Prednisone


  (predniSONE)  60 mg  Q24H


 ORAL


   2/8/19 18:00


 3/10/19 17:59  2/9/19 17:30


 

















Elijah Lewis MD Feb 10, 2019 10:54

## 2019-02-10 NOTE — NUR
NURSE NOTES:

Received patient from GABINO Choi. Patient is awake in bed, alert and oriented x2. Patient is on 
3L nasal cannula, showing no signs and symptoms of pain and/or distress. RT is at bedside. 
Will continue plan of care.

## 2019-02-10 NOTE — PULMONOLOGY PROGRESS NOTE
Assessment/Plan


Assessment/Plan


Pulmonary Progress Note





Plan


Problems:  


(1) COPD exacerbation


(2) Transaminitis


Assessment/Plan


Problem List:


1. Acute on chronic hypoxemic and hypercapnic respiratory failure


2. COPD w/ acute exacerbation


3. Weakness/fatigue


4. Transaminitis - BETTER





Plan: 


-bipap 12/5 PRN and qHS


-monitor ABG


-Titrate down FiO2 to keep SaO2 > 90%


-prednisone 60 mg (D2)


-Optimize pulmonary hygiene/mobilize as tolerated


-RTC and PRN DUOnebs   


-abx: ceftriaxone/azithro


-f/u cultures   


-monitor mental status


-Aspiration precautions


-DVT Px: Hep SQ





Subjective


Allergies:  


Coded Allergies:  


     No Known Allergies (Unverified , 2/5/19)


Subjective


AFVSS BiPAP -> 3L


Feels better


Less cough less SOB some wheezing no F/C no CP


CXR possible infiltrate RUL





Objective





Vital Signs Noted





General Appearance:  no acute distress, other - disheveled


HEENT:  normocephalic, atraumatic


Respiratory/Chest:  chest wall non-tender, no respiratory distress, rhonchi - 

scattered


Cardiovascular:  normal peripheral pulses, tachycardia


Abdomen:  normal bowel sounds, soft, non tender, no organomegaly, non distended

, no mass


Extremities:  no cyanosis, no clubbing, no edema





Microbiology








 Date/Time


Source Procedure


Growth Status


 


 


 2/5/19 15:35


Blood Blood Culture - Preliminary


NO GROWTH AFTER 24 HOURS Resulted


 


 2/5/19 15:20


Blood Blood Culture - Preliminary


NO GROWTH AFTER 24 HOURS Resulted


 


 2/5/19 15:20


Nasal Nares Influenza Types A,B Antigen (DANIELA) - Final Complete


 


 2/5/19 17:00


Rectum - Preliminary Resulted








Laboratory Tests


2/6/19 19:00: 


Arterial Blood pH 7.247*L, Arterial Blood Partial Pressure CO2 80.8*H, Arterial 

Blood Partial Pressure O2 152.2H, Arterial Blood HCO3 34.4H, Arterial Blood 

Oxygen Saturation 98.5, Arterial Blood Base Excess 4.4H, Fredy Test Positive


2/7/19 04:00: 


Arterial Blood pH 7.422, Arterial Blood Partial Pressure CO2 59.2*H, Arterial 

Blood Partial Pressure O2 62.8L, Arterial Blood HCO3 37.7H, Arterial Blood 

Oxygen Saturation 92.2L, Arterial Blood Base Excess 11.0*H, Fredy Test Positive


2/7/19 04:10: 


White Blood Count 15.7#H, Red Blood Count 4.24, Hemoglobin 12.3, Hematocrit 37.6

, Mean Corpuscular Volume 89, Mean Corpuscular Hemoglobin 29.1, Mean 

Corpuscular Hemoglobin Concent 32.8, Red Cell Distribution Width 13.3, Platelet 

Count 185, Mean Platelet Volume 6.6, Neutrophils (%) (Auto) , Lymphocytes (%) (

Auto) , Monocytes (%) (Auto) , Eosinophils (%) (Auto) , Basophils (%) (Auto) , 

Differential Total Cells Counted 100, Neutrophils % (Manual) 89H, Lymphocytes % 

(Manual) 10L, Monocytes % (Manual) 1, Eosinophils % (Manual) 0, Basophils % (

Manual) 0, Band Neutrophils 0, Platelet Estimate Adequate, Platelet Morphology 

Normal, Red Blood Cell Morphology Normal, Sodium Level 143, Potassium Level 4.0

, Chloride Level 105, Carbon Dioxide Level 34H, Anion Gap 4L, Blood Urea 

Nitrogen 18, Creatinine 0.7, Estimat Glomerular Filtration Rate > 60, Glucose 

Level 94, Calcium Level 8.9, Total Bilirubin 0.3, Aspartate Amino Transf (AST/

SGOT) 53H, Alanine Aminotransferase (ALT/SGPT) 64, Alkaline Phosphatase 58, 

Total Protein 5.8L, Albumin 2.6L, Globulin 3.2, Albumin/Globulin Ratio 0.8L, 

Hepatitis A IgM Antibody [Pending], Hepatitis B Surface Antigen [Pending], 

Hepatitis B Core IgM Antibody [Pending], Hepatitis C Antibody [Pending]





Current Medications








 Medications


  (Trade)  Dose


 Ordered  Sig/Moan


 Route


 PRN Reason  Start Time


 Stop Time Status Last Admin


Dose Admin


 


 Albuterol/


 Ipratropium


  (Albuterol/


 Ipratropium)  3 ml  Q4H  PRN


 HHN


 Shortness of Breath  2/5/19 20:45


 2/10/19 20:44   


 


 


 Albuterol/


 Ipratropium


  (Albuterol/


 Ipratropium)  3 ml  Q6HRT


 HHN


   2/6/19 19:00


 2/11/19 18:59  2/7/19 13:50


 


 


 Azithromycin


  (Zithromax)  250 mg  DAILY


 ORAL


   2/7/19 09:00


 2/14/19 08:59  2/7/19 08:42


 


 


 Ceftriaxone


 Sodium 1 gm/


 Dextrose  55 ml @ 


 110 mls/hr  Q24H


 IVPB


   2/6/19 14:30


 2/13/19 14:29  2/7/19 15:34


 


 


 Famotidine


  (Pepcid)  20 mg  DAILY


 ORAL


   2/8/19 09:00


 3/10/19 08:59   


 


 


 Ibuprofen


  (Advil)  400 mg  Q6H  PRN


 ORAL


 For Pain  2/5/19 20:45


 3/7/19 20:44   


 











Subjective


ROS Limited/Unobtainable:  No


Allergies:  


Coded Allergies:  


     No Known Allergies (Unverified , 2/5/19)





Objective





Last 24 Hour Vital Signs








  Date Time  Temp Pulse Resp B/P (MAP) Pulse Ox O2 Delivery O2 Flow Rate FiO2


 


2/10/19 13:20      Nasal Cannula 2.0 


 


2/10/19 13:20      Nasal Cannula 2.0 


 


2/10/19 12:00       3.0 


 


2/10/19 12:00 97.7 85 22 127/77 (94) 94   


 


2/10/19 12:00      Bi-pap  


 


2/10/19 09:00       3.0 


 


2/10/19 08:00 97.7 113 20 124/73 (90) 94   


 


2/10/19 08:00  95      


 


2/10/19 08:00      Bi-pap  


 


2/10/19 07:05      Nasal Cannula 2.0 


 


2/10/19 07:05      Nasal Cannula 2.0 


 


2/10/19 04:38  68 17  97 Facial  30


 


2/10/19 04:00        30


 


2/10/19 04:00      Bi-pap  


 


2/10/19 04:00  69      


 


2/10/19 04:00 97.5 88 20 140/82 (101) 98   


 


2/10/19 02:49  76 17  98 Facial  30


 


2/10/19 01:20  69 18  99 Bi-pap  30


 


2/10/19 01:10  93 20  98 Bi-pap  30


 


2/10/19 01:10  78 19  98 Facial  30


 


2/10/19 00:00        30


 


2/10/19 00:00 97.7 68 20 104/63 (77) 95   


 


2/10/19 00:00      Bi-pap  


 


2/10/19 00:00  68      


 


2/9/19 23:22  74 19  96 Facial  30


 


2/9/19 20:00 96.5 112 20 120/68 (85) 100   


 


2/9/19 20:00      Nasal Cannula 3.0 


 


2/9/19 20:00  93      


 


2/9/19 19:16  88 18  100 Nasal Cannula 3.0 32


 


2/9/19 19:04  93 20  98 Nasal Cannula 2.0 28


 


2/9/19 16:26  81      


 


2/9/19 16:00 97.7 88 20 133/84 (100) 100   


 


2/9/19 16:00       3.0 


 


2/9/19 16:00      Bi-pap  

















Intake and Output  


 


 2/9/19 2/10/19





 19:00 07:00


 


Intake Total 555 ml 60 ml


 


Output Total  300 ml


 


Balance 555 ml -240 ml


 


  


 


Intake Oral 500 ml 60 ml


 


IV Total 55 ml 


 


Output Urine Total  300 ml


 


# Voids 2 


 


# Bowel Movements 2 








Laboratory Tests


2/10/19 04:20: 


White Blood Count 4.4L, Red Blood Count 4.58, Hemoglobin 12.8, Hematocrit 39.6, 

Mean Corpuscular Volume 86, Mean Corpuscular Hemoglobin 28.0, Mean Corpuscular 

Hemoglobin Concent 32.4, Red Cell Distribution Width 13.2, Platelet Count 175, 

Mean Platelet Volume 7.2, Neutrophils (%) (Auto) 80.9H, Lymphocytes (%) (Auto) 

14.8L, Monocytes (%) (Auto) 3.8, Eosinophils (%) (Auto) 0.1, Basophils (%) (Auto

) 0.4, Sodium Level 141, Potassium Level 4.5, Chloride Level 105, Carbon 

Dioxide Level 29, Anion Gap 7, Blood Urea Nitrogen 16, Creatinine 0.7, Estimat 

Glomerular Filtration Rate > 60, Glucose Level 149H, Calcium Level 9.5





Current Medications








 Medications


  (Trade)  Dose


 Ordered  Sig/Mona


 Route


 PRN Reason  Start Time


 Stop Time Status Last Admin


Dose Admin


 


 Albuterol/


 Ipratropium


  (Albuterol/


 Ipratropium)  3 ml  Q4H  PRN


 HHN


 Shortness of Breath  2/5/19 20:45


 2/10/19 20:44   


 


 


 Albuterol/


 Ipratropium


  (Albuterol/


 Ipratropium)  3 ml  Q6HRT


 HHN


   2/6/19 19:00


 2/11/19 18:59  2/10/19 01:10


 


 


 Azithromycin


  (Zithromax)  250 mg  DAILY


 ORAL


   2/7/19 09:00


 2/14/19 08:59  2/10/19 09:28


 


 


 Ceftriaxone


 Sodium 1 gm/


 Dextrose  55 ml @ 


 110 mls/hr  Q24H


 IVPB


   2/6/19 14:30


 2/13/19 14:29  2/10/19 15:34


 


 


 Famotidine


  (Pepcid)  20 mg  DAILY


 ORAL


   2/8/19 09:00


 3/10/19 08:59  2/10/19 09:28


 


 


 Haloperidol


 Lactate


  (Haldol)  5 mg  Q6H  PRN


 IM


 Agitation  2/8/19 20:00


 3/10/19 19:59  2/9/19 21:20


 


 


 Heparin Sodium


  (Porcine)


  (Heparin 5000


 units/ml)  5,000 units  EVERY 12  HOURS


 SUBQ


   2/7/19 21:00


 3/9/19 20:59  2/10/19 09:31


 


 


 Ibuprofen


  (Advil)  400 mg  Q6H  PRN


 ORAL


 For Pain  2/5/19 20:45


 3/7/19 20:44   


 


 


 Lorazepam


  (Ativan 2mg/ml


 1ml)  1 mg  Q4H  PRN


 IM


 For Anxiety  2/8/19 20:00


 2/15/19 19:59   


 


 


 Prednisone


  (predniSONE)  60 mg  Q24H


 ORAL


   2/8/19 18:00


 3/10/19 17:59  2/9/19 17:30


 

















Lenny Durand MD Feb 10, 2019 15:55

## 2019-02-10 NOTE — NUR
CASE MANAGEMENT: REVIEW



2/10/2019



SI: COPD EXACERBATION. PNA.

T 97.5 HR 69 RR 20 B/P 140/82 SATS 98% ON BIPAP FiO2 30 

WBC 4.4  





IS: PREDNISONE PO QD

HEPARIN SUBQ Q12H

AZITHROMYCIN PO QD

ALBUTEROL HHN Q6H

CEFTRIAXONE IV Q24H





***STEP DOWN UNIT STATUS***



DCP: PATIENT REPORTS BEING HOMELESS

## 2019-02-10 NOTE — NUR
NURSE NOTES:RECEIVED REPORT FROM HÉCTOR LLOYD OF ICU DPT.RECEIVED PT WITH HOB ELEVATED 45 DEGREE 
AWAKE VERY CONFUSED USING O2 VIA N/C EL/MINTS,SAT 94%,ASSISTED TO EAT DURING BREAKFAST 
TIME,TOLERATING WELL REGULAR SOUP LIKE THICKINED DIET 75%.PT WITH BILAT SOFT WRIST RESTRAINS 
RELEASED AND PROVIDE PASSIVE ROM  TO ALL EXTREMITIES ALSO REPOSITIONED IN BED TO PROVIDE 
COMFORT AND TO PREVENT SKIN BREAK DOWN.RE-APPLIED BILAT SOFT WRIST RESTRAINS.PT REMAINS FREE 
OF INJURIES AT THIS TIME.WILL CONT TO MONITOR.

## 2019-02-10 NOTE — NUR
NURSE NOTES:

Sleeping with Bipap on. Calm. Awakened for Vital signs; offered AM care but pt refused. 
Wants her breakfast now. HL patent. Afebrile, NSR; no respiratory distress. Sats 98% on 30% 
fiO2

## 2019-02-10 NOTE — GI PROGRESS NOTE
Assessment/Plan


Problems:  


(1) Transaminitis


ICD Codes:  R74.0 - Nonspecific elevation of levels of transaminase and lactic 

acid dehydrogenase [LDH]


SNOMED:  403701139, 039404158


(2) COPD exacerbation


ICD Codes:  J44.1 - Chronic obstructive pulmonary disease with (acute) 

exacerbation


SNOMED:  898031903


Status:  progressing


Status Narrative


Discussed with Dr. Ndiaye


Assessment/Plan


Transaminitis resolved


Patient now off BiPAP


Hepatitis panel reviewed.  Patient positive for hepatitis C





Symptomatic treatment


okay to advance to regular diet, tolerating


Zofran as needed


ppi


fu labs


Outpatient GI procedures, outpatient hepatitis C tx





The patient was seen and examined at bedside and all new and available data was 

reviewed in the patients chart. I agree with the above findings, impression 

and plan.  (Patient seen earlier today. Signature stamp does not reflect 

patient encounter time.). - Juan Miguel Ndiaye MD





Subjective


Subjective


Denies any abdominal pain


Denies any nausea or vomiting


Denies any constipation or diarrhea


Tolerating food





Objective





Last 24 Hour Vital Signs








  Date Time  Temp Pulse Resp B/P (MAP) Pulse Ox O2 Delivery O2 Flow Rate FiO2


 


2/10/19 13:20      Nasal Cannula 2.0 


 


2/10/19 13:20      Nasal Cannula 2.0 


 


2/10/19 12:00       3.0 


 


2/10/19 12:00 97.7 85 22 127/77 (94) 94   


 


2/10/19 12:00      Bi-pap  


 


2/10/19 09:00       3.0 


 


2/10/19 08:00 97.7 113 20 124/73 (90) 94   


 


2/10/19 08:00  95      


 


2/10/19 08:00      Bi-pap  


 


2/10/19 07:05      Nasal Cannula 2.0 


 


2/10/19 07:05      Nasal Cannula 2.0 


 


2/10/19 04:38  68 17  97 Facial  30


 


2/10/19 04:00        30


 


2/10/19 04:00      Bi-pap  


 


2/10/19 04:00  69      


 


2/10/19 04:00 97.5 88 20 140/82 (101) 98   


 


2/10/19 02:49  76 17  98 Facial  30


 


2/10/19 01:20  69 18  99 Bi-pap  30


 


2/10/19 01:10  93 20  98 Bi-pap  30


 


2/10/19 01:10  78 19  98 Facial  30


 


2/10/19 00:00        30


 


2/10/19 00:00 97.7 68 20 104/63 (77) 95   


 


2/10/19 00:00      Bi-pap  


 


2/10/19 00:00  68      


 


2/9/19 23:22  74 19  96 Facial  30


 


2/9/19 20:00 96.5 112 20 120/68 (85) 100   


 


2/9/19 20:00      Nasal Cannula 3.0 


 


2/9/19 20:00  93      


 


2/9/19 19:16  88 18  100 Nasal Cannula 3.0 32


 


2/9/19 19:04  93 20  98 Nasal Cannula 2.0 28


 


2/9/19 16:26  81      


 


2/9/19 16:00 97.7 88 20 133/84 (100) 100   


 


2/9/19 16:00       3.0 


 


2/9/19 16:00      Bi-pap  

















Intake and Output  


 


 2/9/19 2/10/19





 19:00 07:00


 


Intake Total 555 ml 60 ml


 


Output Total  300 ml


 


Balance 555 ml -240 ml


 


  


 


Intake Oral 500 ml 60 ml


 


IV Total 55 ml 


 


Output Urine Total  300 ml


 


# Voids 2 


 


# Bowel Movements 2 











Laboratory Tests








Test


  2/10/19


04:20


 


White Blood Count


  4.4 K/UL


(4.8-10.8)  L


 


Red Blood Count


  4.58 M/UL


(4.20-5.40)


 


Hemoglobin


  12.8 G/DL


(12.0-16.0)


 


Hematocrit


  39.6 %


(37.0-47.0)


 


Mean Corpuscular Volume 86 FL (80-99)  


 


Mean Corpuscular Hemoglobin


  28.0 PG


(27.0-31.0)


 


Mean Corpuscular Hemoglobin


Concent 32.4 G/DL


(32.0-36.0)


 


Red Cell Distribution Width


  13.2 %


(11.6-14.8)


 


Platelet Count


  175 K/UL


(150-450)


 


Mean Platelet Volume


  7.2 FL


(6.5-10.1)


 


Neutrophils (%) (Auto)


  80.9 %


(45.0-75.0)  H


 


Lymphocytes (%) (Auto)


  14.8 %


(20.0-45.0)  L


 


Monocytes (%) (Auto)


  3.8 %


(1.0-10.0)


 


Eosinophils (%) (Auto)


  0.1 %


(0.0-3.0)


 


Basophils (%) (Auto)


  0.4 %


(0.0-2.0)


 


Sodium Level


  141 MMOL/L


(136-145)


 


Potassium Level


  4.5 MMOL/L


(3.5-5.1)


 


Chloride Level


  105 MMOL/L


()


 


Carbon Dioxide Level


  29 MMOL/L


(21-32)


 


Anion Gap


  7 mmol/L


(5-15)


 


Blood Urea Nitrogen


  16 mg/dL


(7-18)


 


Creatinine


  0.7 MG/DL


(0.55-1.30)


 


Estimat Glomerular Filtration


Rate > 60 mL/min


(>60)


 


Glucose Level


  149 MG/DL


()  H


 


Calcium Level


  9.5 MG/DL


(8.5-10.1)








Height (Feet):  5


Height (Inches):  5.00


Weight (Pounds):  123


General Appearance:  WD/WN, no apparent distress, alert


Cardiovascular:  normal rate


Respiratory/Chest:  normal breath sounds, no respiratory distress


Abdominal Exam:  normal bowel sounds, non tender, soft


Extremities:  non-tender











Stacy Hernandez NP Feb 10, 2019 15:25

## 2019-02-10 NOTE — GENERAL PROGRESS NOTE
Assessment/Plan


Problem List:  


(1) COPD exacerbation


ICD Codes:  J44.1 - Chronic obstructive pulmonary disease with (acute) 

exacerbation


SNOMED:  399019171


(2) Transaminitis


ICD Codes:  R74.0 - Nonspecific elevation of levels of transaminase and lactic 

acid dehydrogenase [LDH]


SNOMED:  519244437, 399529504


Status:  progressing


Assessment/Plan


transfer to [facility] surg


afebrile


no wheezing


copd exacerbation improving





Subjective


ROS Limited/Unobtainable:  Yes


Allergies:  


Coded Allergies:  


     No Known Allergies (Unverified , 2/5/19)





Objective





Last 24 Hour Vital Signs








  Date Time  Temp Pulse Resp B/P (MAP) Pulse Ox O2 Delivery O2 Flow Rate FiO2


 


2/10/19 20:00 97.5 81 24 122/74 (90) 97   


 


2/10/19 19:16  92 20  98 Nasal Cannula 3.0 32


 


2/10/19 19:05  90 20  94 Nasal Cannula 3.0 32


 


2/10/19 19:01  95 20   Nasal Cannula 3.0 32


 


2/10/19 16:00 98.2 88 35 137/82 (100) 95   


 


2/10/19 16:00  82      


 


2/10/19 16:00      Bi-pap  


 


2/10/19 16:00       3.0 


 


2/10/19 13:20      Nasal Cannula 2.0 


 


2/10/19 13:20      Nasal Cannula 2.0 


 


2/10/19 12:00       3.0 


 


2/10/19 12:00 97.7 85 22 127/77 (94) 94   


 


2/10/19 12:00      Bi-pap  


 


2/10/19 12:00  79      


 


2/10/19 09:00       3.0 


 


2/10/19 08:00 97.7 113 20 124/73 (90) 94   


 


2/10/19 08:00  95      


 


2/10/19 08:00      Bi-pap  


 


2/10/19 07:05      Nasal Cannula 2.0 


 


2/10/19 07:05      Nasal Cannula 2.0 


 


2/10/19 04:38  68 17  97 Facial  30


 


2/10/19 04:00        30


 


2/10/19 04:00      Bi-pap  


 


2/10/19 04:00  69      


 


2/10/19 04:00 97.5 88 20 140/82 (101) 98   


 


2/10/19 02:49  76 17  98 Facial  30


 


2/10/19 01:20  69 18  99 Bi-pap  30


 


2/10/19 01:10  93 20  98 Bi-pap  30


 


2/10/19 01:10  78 19  98 Facial  30


 


2/10/19 00:00        30


 


2/10/19 00:00 97.7 68 20 104/63 (77) 95   


 


2/10/19 00:00      Bi-pap  


 


2/10/19 00:00  68      


 


2/9/19 23:22  74 19  96 Facial  30

















Intake and Output  


 


 2/9/19 2/10/19





 19:00 07:00


 


Intake Total 555 ml 60 ml


 


Output Total  300 ml


 


Balance 555 ml -240 ml


 


  


 


Intake Oral 500 ml 60 ml


 


IV Total 55 ml 


 


Output Urine Total  300 ml


 


# Voids 2 


 


# Bowel Movements 2 








Laboratory Tests


2/10/19 04:20: 


White Blood Count 4.4L, Red Blood Count 4.58, Hemoglobin 12.8, Hematocrit 39.6, 

Mean Corpuscular Volume 86, Mean Corpuscular Hemoglobin 28.0, Mean Corpuscular 

Hemoglobin Concent 32.4, Red Cell Distribution Width 13.2, Platelet Count 175, 

Mean Platelet Volume 7.2, Neutrophils (%) (Auto) 80.9H, Lymphocytes (%) (Auto) 

14.8L, Monocytes (%) (Auto) 3.8, Eosinophils (%) (Auto) 0.1, Basophils (%) (Auto

) 0.4, Sodium Level 141, Potassium Level 4.5, Chloride Level 105, Carbon 

Dioxide Level 29, Anion Gap 7, Blood Urea Nitrogen 16, Creatinine 0.7, Estimat 

Glomerular Filtration Rate > 60, Glucose Level 149H, Calcium Level 9.5


Height (Feet):  5


Height (Inches):  5.00


Weight (Pounds):  123


Neck:  supple


Cardiovascular:  normal rate


Respiratory/Chest:  lungs clear


Abdomen:  soft











Melia Coombs MD Feb 10, 2019 21:09

## 2019-02-11 VITALS — SYSTOLIC BLOOD PRESSURE: 124 MMHG | DIASTOLIC BLOOD PRESSURE: 76 MMHG

## 2019-02-11 VITALS — DIASTOLIC BLOOD PRESSURE: 72 MMHG | SYSTOLIC BLOOD PRESSURE: 130 MMHG

## 2019-02-11 VITALS — SYSTOLIC BLOOD PRESSURE: 126 MMHG | DIASTOLIC BLOOD PRESSURE: 68 MMHG

## 2019-02-11 VITALS — SYSTOLIC BLOOD PRESSURE: 115 MMHG | DIASTOLIC BLOOD PRESSURE: 78 MMHG

## 2019-02-11 VITALS — DIASTOLIC BLOOD PRESSURE: 77 MMHG | SYSTOLIC BLOOD PRESSURE: 121 MMHG

## 2019-02-11 VITALS — SYSTOLIC BLOOD PRESSURE: 124 MMHG | DIASTOLIC BLOOD PRESSURE: 73 MMHG

## 2019-02-11 RX ADMIN — HEPARIN SODIUM SCH UNITS: 5000 INJECTION INTRAVENOUS; SUBCUTANEOUS at 09:29

## 2019-02-11 RX ADMIN — AZITHROMYCIN DIHYDRATE SCH MG: 250 TABLET, FILM COATED ORAL at 09:29

## 2019-02-11 RX ADMIN — IPRATROPIUM BROMIDE AND ALBUTEROL SULFATE SCH ML: .5; 3 SOLUTION RESPIRATORY (INHALATION) at 07:00

## 2019-02-11 RX ADMIN — IPRATROPIUM BROMIDE AND ALBUTEROL SULFATE SCH ML: .5; 3 SOLUTION RESPIRATORY (INHALATION) at 01:12

## 2019-02-11 RX ADMIN — SODIUM CHLORIDE SCH MLS/HR: 0.9 INJECTION INTRAVENOUS at 13:50

## 2019-02-11 RX ADMIN — HEPARIN SODIUM SCH UNITS: 5000 INJECTION INTRAVENOUS; SUBCUTANEOUS at 21:53

## 2019-02-11 RX ADMIN — IPRATROPIUM BROMIDE AND ALBUTEROL SULFATE SCH ML: .5; 3 SOLUTION RESPIRATORY (INHALATION) at 13:00

## 2019-02-11 NOTE — NUR
SI:     COPD EXACERBATION

T. 97.5 HR 71 RR 16 B/P 124/61

3L NC 







IS:     ROCEPHIN IV

ZITHROMAX PO

PEPCID PO

HEPARIN SUBC

********MED/SURG STATUS*****

## 2019-02-11 NOTE — NUR
NURSE NOTES:

 received patient resting comfortably , no sign of distress, HL patent , on bilateral  soft 
wrist restraints on, on fall and aspiration precaution observed and maintained, bed in 
lowest position, call light w/n reach 



maggarao, rn

## 2019-02-11 NOTE — GENERAL PROGRESS NOTE
Assessment/Plan


Problem List:  


(1) COPD exacerbation


ICD Codes:  J44.1 - Chronic obstructive pulmonary disease with (acute) 

exacerbation


SNOMED:  012083403


(2) Transaminitis


ICD Codes:  R74.0 - Nonspecific elevation of levels of transaminase and lactic 

acid dehydrogenase [LDH]


SNOMED:  468610263, 294683913


Status:  progressing


Assessment/Plan


copd improving


not hypoxic


reviewed chart and labs


lft are improving


r





Subjective


ROS Limited/Unobtainable:  Yes


Allergies:  


Coded Allergies:  


     No Known Allergies (Unverified , 2/5/19)





Objective





Last 24 Hour Vital Signs








  Date Time  Temp Pulse Resp B/P (MAP) Pulse Ox O2 Delivery O2 Flow Rate FiO2


 


2/11/19 16:03       3.0 


 


2/11/19 16:00 98.1 84 18 124/76 (92) 94   


 


2/11/19 15:26  81 18  99 Nasal Cannula 3.0 32


 


2/11/19 15:02  80 18  92 Nasal Cannula 3.0 32


 


2/11/19 12:01       3.0 


 


2/11/19 11:55 97.7 78 18 124/73 (90) 98   


 


2/11/19 08:11  75 16  99 Nasal Cannula 3.0 32


 


2/11/19 08:00       3.0 


 


2/11/19 08:00      Bi-pap  


 


2/11/19 08:00 97.4 72 18 121/77 (92) 100   


 


2/11/19 08:00      Bi-pap  


 


2/11/19 07:53  71 16  95 Nasal Cannula 3.0 32


 


2/11/19 04:00       3.0 


 


2/11/19 04:00      Bi-pap  


 


2/11/19 04:00 97.5 68 22 126/68 (87) 100   


 


2/11/19 03:15  79      


 


2/11/19 01:18  68 20  99 Nasal Cannula 3.0 32


 


2/11/19 01:11  73 20  98 Nasal Cannula 3.0 32


 


2/11/19 00:00 97.2 77 22 115/78 (90) 95   


 


2/11/19 00:00      Bi-pap  


 


2/10/19 23:38  79      

















Intake and Output  


 


 2/10/19 2/11/19





 19:00 07:00


 


Intake Total 1005 ml 


 


Balance 1005 ml 


 


  


 


Intake Oral 950 ml 


 


IV Total 55 ml 


 


# Voids 3 2








Height (Feet):  5


Height (Inches):  5.00


Weight (Pounds):  123


Cardiovascular:  normal rate


Respiratory/Chest:  lungs clear


Abdomen:  soft











Melia Coombs MD Feb 11, 2019 21:27

## 2019-02-11 NOTE — NUR
NURSE NOTES:

Transferred patient to [facility]. Report given to GABINO Givens. Patient is stable, belongings 
were accounted for at bedside.

## 2019-02-11 NOTE — NUR
HAND-OFF: 

Report given to GABINO THACKER.

Patient calm and quiet,free from injury, no sign of distress



gabino gomez

## 2019-02-11 NOTE — PULMONOLOGY PROGRESS NOTE
Assessment/Plan


Assessment/Plan


1. Acute hypoxemic and hypercapnic respiratory failure


2. COPD w/ acute exacerbation


3. Weakness/fatigue


4. Transaminitis





Plan:


-bipap 12/5 qhs and prn


-last dose prednisone today


-Monitor LFTs


-abx: ceftriaxone/azithro


-f/u cultures


-monitor mental status





Subjective


ROS Limited/Unobtainable:  Yes


Interval Events:  Seems confused but states breathing better.  Less altered.


Constitutional:  Reports: no symptoms


HEENT:  Repors: no symptoms


Respiratory:  Reports: shortness of breath


Cardiovascular:  Reports: no symptoms


Gastrointestinal/Abdominal:  Reports: no symptoms


Allergies:  


Coded Allergies:  


     No Known Allergies (Unverified , 2/5/19)





Objective





Last 24 Hour Vital Signs








  Date Time  Temp Pulse Resp B/P (MAP) Pulse Ox O2 Delivery O2 Flow Rate FiO2


 


2/11/19 16:03       3.0 


 


2/11/19 16:00 98.1 84 18 124/76 (92) 94   


 


2/11/19 15:26  81 18  99 Nasal Cannula 3.0 32


 


2/11/19 15:02  80 18  92 Nasal Cannula 3.0 32


 


2/11/19 12:01       3.0 


 


2/11/19 11:55 97.7 78 18 124/73 (90) 98   


 


2/11/19 08:11  75 16  99 Nasal Cannula 3.0 32


 


2/11/19 08:00       3.0 


 


2/11/19 08:00      Bi-pap  


 


2/11/19 08:00 97.4 72 18 121/77 (92) 100   


 


2/11/19 08:00      Bi-pap  


 


2/11/19 07:53  71 16  95 Nasal Cannula 3.0 32


 


2/11/19 04:00       3.0 


 


2/11/19 04:00      Bi-pap  


 


2/11/19 04:00 97.5 68 22 126/68 (87) 100   


 


2/11/19 03:15  79      


 


2/11/19 01:18  68 20  99 Nasal Cannula 3.0 32


 


2/11/19 01:11  73 20  98 Nasal Cannula 3.0 32


 


2/11/19 00:00 97.2 77 22 115/78 (90) 95   


 


2/11/19 00:00      Bi-pap  


 


2/10/19 23:38  79      


 


2/10/19 20:00 97.5 81 24 122/74 (90) 97   


 


2/10/19 20:00       3.0 


 


2/10/19 20:00      Bi-pap  


 


2/10/19 19:24  83      


 


2/10/19 19:16  92 20  98 Nasal Cannula 3.0 32


 


2/10/19 19:05  90 20  94 Nasal Cannula 3.0 32


 


2/10/19 19:01  95 20   Nasal Cannula 3.0 32

















Intake and Output  


 


 2/10/19 2/11/19





 19:00 07:00


 


Intake Total 1005 ml 


 


Balance 1005 ml 


 


  


 


Intake Oral 950 ml 


 


IV Total 55 ml 


 


# Voids 3 2








General Appearance:  no acute distress


HEENT:  PERRL


Respiratory/Chest:  expiratory wheezing


Cardiovascular:  regular rhythm


Abdomen:  soft, non tender


Extremities:  no edema





Current Medications








 Medications


  (Trade)  Dose


 Ordered  Sig/Mona


 Route


 PRN Reason  Start Time


 Stop Time Status Last Admin


Dose Admin


 


 Albuterol/


 Ipratropium


  (Albuterol/


 Ipratropium)  3 ml  Q6HRT


 HHN


   2/11/19 07:00


 2/11/19 18:59  2/11/19 13:00


 


 


 Azithromycin


  (Zithromax)  250 mg  DAILY


 ORAL


   2/11/19 09:00


 2/14/19 08:59  2/11/19 09:29


 


 


 Ceftriaxone


 Sodium 1 gm/


 Dextrose  55 ml @ 


 110 mls/hr  Q24H


 IVPB


   2/11/19 14:30


 2/13/19 14:29  2/11/19 13:50


 


 


 Famotidine


  (Pepcid)  20 mg  DAILY


 ORAL


   2/11/19 09:00


 3/10/19 08:59  2/11/19 09:28


 


 


 Haloperidol


 Lactate


  (Haldol)  5 mg  Q6H  PRN


 IM


 Agitation  2/11/19 08:00


 3/10/19 19:59   


 


 


 Heparin Sodium


  (Porcine)


  (Heparin 5000


 units/ml)  5,000 units  EVERY 12  HOURS


 SUBQ


   2/11/19 09:00


 3/9/19 20:59  2/11/19 09:29


 


 


 Ibuprofen


  (Advil)  400 mg  Q6H  PRN


 ORAL


 For Pain  2/11/19 08:45


 3/7/19 20:44   


 


 


 Lorazepam


  (Ativan 2mg/ml


 1ml)  1 mg  Q4H  PRN


 IM


 For Anxiety  2/11/19 08:00


 2/15/19 19:59   


 


 


 Prednisone


  (predniSONE)  60 mg  Q24H


 ORAL


   2/11/19 18:00


 3/10/19 17:59  2/11/19 17:16


 

















Benji Rodrgiuez MD Feb 11, 2019 17:39

## 2019-02-11 NOTE — GI PROGRESS NOTE
Assessment/Plan


Problems:  


(1) Transaminitis


ICD Codes:  R74.0 - Nonspecific elevation of levels of transaminase and lactic 

acid dehydrogenase [LDH]


SNOMED:  307904672, 101686798


(2) COPD exacerbation


ICD Codes:  J44.1 - Chronic obstructive pulmonary disease with (acute) 

exacerbation


SNOMED:  765472658


Status:  unchanged


Status Narrative


Discussed with Dr. Ndiaye


Assessment/Plan


Transaminitis resolved


Patient now off BiPAP


Hepatitis panel reviewed.  Patient positive for hepatitis C





Symptomatic treatment


okay to advance to regular diet, tolerating


Zofran as needed


ppi


fu labs


Outpatient GI procedures, outpatient hepatitis C tx





The patient was seen and examined at bedside and all new and available data was 

reviewed in the patients chart. I agree with the above findings, impression 

and plan.  (Patient seen earlier today. Signature stamp does not reflect 

patient encounter time.). - Juan Miguel Ndiaye MD





Subjective


Subjective


Denies any abdominal pain


Denies any nausea or vomiting


Denies any constipation or diarrhea


Tolerating food





Objective





Last 24 Hour Vital Signs








  Date Time  Temp Pulse Resp B/P (MAP) Pulse Ox O2 Delivery O2 Flow Rate FiO2


 


2/11/19 08:11  75 16  99 Nasal Cannula 3.0 32


 


2/11/19 08:00       3.0 


 


2/11/19 08:00      Bi-pap  


 


2/11/19 08:00 97.4 72 18 121/77 (92) 100   


 


2/11/19 08:00      Bi-pap  


 


2/11/19 07:53  71 16  95 Nasal Cannula 3.0 32


 


2/11/19 04:00       3.0 


 


2/11/19 04:00      Bi-pap  


 


2/11/19 04:00 97.5 68 22 126/68 (87) 100   


 


2/11/19 03:15  79      


 


2/11/19 01:18  68 20  99 Nasal Cannula 3.0 32


 


2/11/19 01:11  73 20  98 Nasal Cannula 3.0 32


 


2/11/19 00:00 97.2 77 22 115/78 (90) 95   


 


2/11/19 00:00      Bi-pap  


 


2/10/19 23:38  79      


 


2/10/19 20:00 97.5 81 24 122/74 (90) 97   


 


2/10/19 20:00       3.0 


 


2/10/19 20:00      Bi-pap  


 


2/10/19 19:24  83      


 


2/10/19 19:16  92 20  98 Nasal Cannula 3.0 32


 


2/10/19 19:05  90 20  94 Nasal Cannula 3.0 32


 


2/10/19 19:01  95 20   Nasal Cannula 3.0 32


 


2/10/19 16:00 98.2 88 35 137/82 (100) 95   


 


2/10/19 16:00  82      


 


2/10/19 16:00      Bi-pap  


 


2/10/19 16:00       3.0 


 


2/10/19 13:20      Nasal Cannula 2.0 


 


2/10/19 13:20      Nasal Cannula 2.0 


 


2/10/19 12:00       3.0 


 


2/10/19 12:00 97.7 85 22 127/77 (94) 94   


 


2/10/19 12:00      Bi-pap  


 


2/10/19 12:00  79      

















Intake and Output  


 


 2/10/19 2/11/19





 18:59 06:59


 


Intake Total 1005 ml 


 


Balance 1005 ml 


 


  


 


Intake Oral 950 ml 


 


IV Total 55 ml 


 


# Voids 3 2








Height (Feet):  5


Height (Inches):  5.00


Weight (Pounds):  123


General Appearance:  WD/WN, no apparent distress, alert


Cardiovascular:  normal rate


Respiratory/Chest:  normal breath sounds, no respiratory distress


Abdominal Exam:  normal bowel sounds, non tender, soft


Extremities:  normal range of motion, non-tender











Stacy Hernandez NP Feb 11, 2019 10:36

## 2019-02-11 NOTE — GENERAL PROGRESS NOTE
Assessment/Plan


Problem List:  


(1) Acute metabolic encephalopathy


ICD Codes:  G93.41 - Metabolic encephalopathy


SNOMED:  17427428, 599082516


Assessment/Plan


haldol im


ativan im


cont bilat restraints





Subjective


Neurologic/Psychiatric:  Reports: anxiety, depressed, emotional problems


Allergies:  


Coded Allergies:  


     No Known Allergies (Unverified , 2/5/19)





Objective





Last 24 Hour Vital Signs








  Date Time  Temp Pulse Resp B/P (MAP) Pulse Ox O2 Delivery O2 Flow Rate FiO2


 


2/11/19 08:11  75 16  99 Nasal Cannula 3.0 32


 


2/11/19 08:00       3.0 


 


2/11/19 08:00      Bi-pap  


 


2/11/19 08:00 97.4 72 18 121/77 (92) 100   


 


2/11/19 08:00      Bi-pap  


 


2/11/19 07:53  71 16  95 Nasal Cannula 3.0 32


 


2/11/19 04:00       3.0 


 


2/11/19 04:00      Bi-pap  


 


2/11/19 04:00 97.5 68 22 126/68 (87) 100   


 


2/11/19 03:15  79      


 


2/11/19 01:18  68 20  99 Nasal Cannula 3.0 32


 


2/11/19 01:11  73 20  98 Nasal Cannula 3.0 32


 


2/11/19 00:00 97.2 77 22 115/78 (90) 95   


 


2/11/19 00:00      Bi-pap  


 


2/10/19 23:38  79      


 


2/10/19 20:00 97.5 81 24 122/74 (90) 97   


 


2/10/19 20:00       3.0 


 


2/10/19 20:00      Bi-pap  


 


2/10/19 19:24  83      


 


2/10/19 19:16  92 20  98 Nasal Cannula 3.0 32


 


2/10/19 19:05  90 20  94 Nasal Cannula 3.0 32


 


2/10/19 19:01  95 20   Nasal Cannula 3.0 32


 


2/10/19 16:00 98.2 88 35 137/82 (100) 95   


 


2/10/19 16:00  82      


 


2/10/19 16:00      Bi-pap  


 


2/10/19 16:00       3.0 


 


2/10/19 13:20      Nasal Cannula 2.0 


 


2/10/19 13:20      Nasal Cannula 2.0 


 


2/10/19 12:00       3.0 


 


2/10/19 12:00 97.7 85 22 127/77 (94) 94   


 


2/10/19 12:00      Bi-pap  


 


2/10/19 12:00  79      

















Intake and Output  


 


 2/10/19 2/11/19





 18:59 06:59


 


Intake Total 1005 ml 


 


Balance 1005 ml 


 


  


 


Intake Oral 950 ml 


 


IV Total 55 ml 


 


# Voids 3 2








Height (Feet):  5


Height (Inches):  5.00


Weight (Pounds):  123


General Appearance:  alert, agitated











Eddie Irizarry MD Feb 11, 2019 10:40

## 2019-02-11 NOTE — INFECTIOUS DISEASES PROG NOTE
Assessment/Plan


Assessment/Plan


A:


1. COPD exacerbation,


2. Hypercapnic respiratory failure.


3. Homelessness.


4. Elevated transaminase, likely hepatitis C


5. Leukocytosis resolved


6. ? pneumonia


P:


Continue Rocephin & Zithromax





Subjective


ROS Limited/Unobtainable:  Yes


Respiratory:  Reports: dry cough


Cardiovascular:  Reports: no symptoms


Gastrointestinal/Abdominal:  Reports: no symptoms


Genitourinary:  Reports: no symptoms


Allergies:  


Coded Allergies:  


     No Known Allergies (Unverified , 2/5/19)





Objective


Vital Signs





Last 24 Hour Vital Signs








  Date Time  Temp Pulse Resp B/P (MAP) Pulse Ox O2 Delivery O2 Flow Rate FiO2


 


2/11/19 12:01       3.0 


 


2/11/19 11:55 97.7 78 18 124/73 (90) 98   


 


2/11/19 08:11  75 16  99 Nasal Cannula 3.0 32


 


2/11/19 08:00       3.0 


 


2/11/19 08:00      Bi-pap  


 


2/11/19 08:00 97.4 72 18 121/77 (92) 100   


 


2/11/19 08:00      Bi-pap  


 


2/11/19 07:53  71 16  95 Nasal Cannula 3.0 32


 


2/11/19 04:00       3.0 


 


2/11/19 04:00      Bi-pap  


 


2/11/19 04:00 97.5 68 22 126/68 (87) 100   


 


2/11/19 03:15  79      


 


2/11/19 01:18  68 20  99 Nasal Cannula 3.0 32


 


2/11/19 01:11  73 20  98 Nasal Cannula 3.0 32


 


2/11/19 00:00 97.2 77 22 115/78 (90) 95   


 


2/11/19 00:00      Bi-pap  


 


2/10/19 23:38  79      


 


2/10/19 20:00 97.5 81 24 122/74 (90) 97   


 


2/10/19 20:00       3.0 


 


2/10/19 20:00      Bi-pap  


 


2/10/19 19:24  83      


 


2/10/19 19:16  92 20  98 Nasal Cannula 3.0 32


 


2/10/19 19:05  90 20  94 Nasal Cannula 3.0 32


 


2/10/19 19:01  95 20   Nasal Cannula 3.0 32


 


2/10/19 16:00 98.2 88 35 137/82 (100) 95   


 


2/10/19 16:00  82      


 


2/10/19 16:00      Bi-pap  


 


2/10/19 16:00       3.0 


 


2/10/19 13:20      Nasal Cannula 2.0 


 


2/10/19 13:20      Nasal Cannula 2.0 








Height (Feet):  5


Height (Inches):  5.00


Weight (Pounds):  123


General Appearance:  no acute distress


HEENT:  mucous membranes moist


Respiratory/Chest:  decreased breath sounds


Cardiovascular:  normal rate


Abdomen:  soft, non tender


Extremities:  no edema


Neurologic/Psychiatric:  alert, responsive





Current Medications








 Medications


  (Trade)  Dose


 Ordered  Sig/Mona


 Route


 PRN Reason  Start Time


 Stop Time Status Last Admin


Dose Admin


 


 Albuterol/


 Ipratropium


  (Albuterol/


 Ipratropium)  3 ml  Q6HRT


 HHN


   2/11/19 07:00


 2/11/19 18:59  2/11/19 07:00


 


 


 Azithromycin


  (Zithromax)  250 mg  DAILY


 ORAL


   2/11/19 09:00


 2/14/19 08:59  2/11/19 09:29


 


 


 Ceftriaxone


 Sodium 1 gm/


 Dextrose  55 ml @ 


 110 mls/hr  Q24H


 IVPB


   2/11/19 14:30


 2/13/19 14:29   


 


 


 Famotidine


  (Pepcid)  20 mg  DAILY


 ORAL


   2/11/19 09:00


 3/10/19 08:59  2/11/19 09:28


 


 


 Haloperidol


 Lactate


  (Haldol)  5 mg  Q6H  PRN


 IM


 Agitation  2/11/19 08:00


 3/10/19 19:59   


 


 


 Heparin Sodium


  (Porcine)


  (Heparin 5000


 units/ml)  5,000 units  EVERY 12  HOURS


 SUBQ


   2/11/19 09:00


 3/9/19 20:59  2/11/19 09:29


 


 


 Ibuprofen


  (Advil)  400 mg  Q6H  PRN


 ORAL


 For Pain  2/11/19 08:45


 3/7/19 20:44   


 


 


 Lorazepam


  (Ativan 2mg/ml


 1ml)  1 mg  Q4H  PRN


 IM


 For Anxiety  2/11/19 08:00


 2/15/19 19:59   


 


 


 Prednisone


  (predniSONE)  60 mg  Q24H


 ORAL


   2/11/19 18:00


 3/10/19 17:59   


 

















Elijah Lewis MD Feb 11, 2019 12:16

## 2019-02-11 NOTE — NUR
NURSE NOTES:

Pt received from JESSICA from GABINO Magana, pt stable with soft wrist restraints, brought up with 
BIPAP machine, SCDs, IV on right forearm, pt talkative, bed in lowest position, no signs of 
distress or signs pain at the moment.

## 2019-02-11 NOTE — NUR
NURSE NOTES:



Received patient awake,alert,verbal,follows simple command,cooperative at this time.

## 2019-02-12 VITALS — DIASTOLIC BLOOD PRESSURE: 88 MMHG | SYSTOLIC BLOOD PRESSURE: 130 MMHG

## 2019-02-12 VITALS — DIASTOLIC BLOOD PRESSURE: 83 MMHG | SYSTOLIC BLOOD PRESSURE: 124 MMHG

## 2019-02-12 VITALS — SYSTOLIC BLOOD PRESSURE: 125 MMHG | DIASTOLIC BLOOD PRESSURE: 74 MMHG

## 2019-02-12 VITALS — DIASTOLIC BLOOD PRESSURE: 76 MMHG | SYSTOLIC BLOOD PRESSURE: 124 MMHG

## 2019-02-12 VITALS — DIASTOLIC BLOOD PRESSURE: 79 MMHG | SYSTOLIC BLOOD PRESSURE: 131 MMHG

## 2019-02-12 VITALS — DIASTOLIC BLOOD PRESSURE: 75 MMHG | SYSTOLIC BLOOD PRESSURE: 132 MMHG

## 2019-02-12 LAB
ADD MANUAL DIFF: NO
ANION GAP SERPL CALC-SCNC: 7 MMOL/L (ref 5–15)
BASOPHILS NFR BLD AUTO: 0.7 % (ref 0–2)
BUN SERPL-MCNC: 15 MG/DL (ref 7–18)
CALCIUM SERPL-MCNC: 9.6 MG/DL (ref 8.5–10.1)
CHLORIDE SERPL-SCNC: 104 MMOL/L (ref 98–107)
CO2 SERPL-SCNC: 30 MMOL/L (ref 21–32)
CREAT SERPL-MCNC: 0.6 MG/DL (ref 0.55–1.3)
EOSINOPHIL NFR BLD AUTO: 0 % (ref 0–3)
ERYTHROCYTE [DISTWIDTH] IN BLOOD BY AUTOMATED COUNT: 12.9 % (ref 11.6–14.8)
HCT VFR BLD CALC: 41.3 % (ref 37–47)
HGB BLD-MCNC: 13.5 G/DL (ref 12–16)
LYMPHOCYTES NFR BLD AUTO: 14.8 % (ref 20–45)
MCV RBC AUTO: 86 FL (ref 80–99)
MONOCYTES NFR BLD AUTO: 6 % (ref 1–10)
NEUTROPHILS NFR BLD AUTO: 78.5 % (ref 45–75)
PLATELET # BLD: 174 K/UL (ref 150–450)
POTASSIUM SERPL-SCNC: 4.5 MMOL/L (ref 3.5–5.1)
RBC # BLD AUTO: 4.78 M/UL (ref 4.2–5.4)
SODIUM SERPL-SCNC: 141 MMOL/L (ref 136–145)
WBC # BLD AUTO: 4.9 K/UL (ref 4.8–10.8)

## 2019-02-12 RX ADMIN — SODIUM CHLORIDE SCH MLS/HR: 0.9 INJECTION INTRAVENOUS at 15:16

## 2019-02-12 RX ADMIN — AZITHROMYCIN DIHYDRATE SCH MG: 250 TABLET, FILM COATED ORAL at 09:06

## 2019-02-12 RX ADMIN — HEPARIN SODIUM SCH UNITS: 5000 INJECTION INTRAVENOUS; SUBCUTANEOUS at 09:08

## 2019-02-12 RX ADMIN — SODIUM CHLORIDE SCH MLS/HR: 0.9 INJECTION INTRAVENOUS at 16:24

## 2019-02-12 RX ADMIN — HEPARIN SODIUM SCH UNITS: 5000 INJECTION INTRAVENOUS; SUBCUTANEOUS at 21:00

## 2019-02-12 NOTE — PULMONOLOGY PROGRESS NOTE
Assessment/Plan


Assessment/Plan


1. Acute hypoxemic and hypercapnic respiratory failure


2. COPD w/ acute exacerbation


3. Weakness/fatigue


4. Transaminitis





Plan:


-bipap 12/5 qhs and prn


-monitor off prednisone


-Monitor LFTs


-abx: ceftriaxone/azithro


-f/u cultures


-monitor mental status





Subjective


ROS Limited/Unobtainable:  Yes


Interval Events:  No acute events.  Breathing stable.  Remains on oxygen.  

Sleeping


Allergies:  


Coded Allergies:  


     No Known Allergies (Unverified , 2/5/19)





Objective





Last 24 Hour Vital Signs








  Date Time  Temp Pulse Resp B/P (MAP) Pulse Ox O2 Delivery O2 Flow Rate FiO2


 


2/12/19 05:10  81 16  94   


 


2/12/19 04:00        30


 


2/12/19 04:00 97.5 74 20 132/75 (94) 93   


 


2/12/19 02:30  72 16  93 Facial  30


 


2/12/19 01:30  75 18  94 Facial  30


 


2/12/19 00:00        30


 


2/12/19 00:00 98.0 59 20 125/74 (91) 100   


 


2/11/19 23:27  70 16  93 Facial  30


 


2/11/19 21:55  77 20  94 Facial  30


 


2/11/19 21:00      Bi-pap  


 


2/11/19 20:00 98.4 89 18 130/72 (91) 92   


 


2/11/19 20:00       3.0 


 


2/11/19 16:03       3.0 


 


2/11/19 16:00 98.1 84 18 124/76 (92) 94   


 


2/11/19 15:26  81 18  99 Nasal Cannula 3.0 32


 


2/11/19 15:02  80 18  92 Nasal Cannula 3.0 32


 


2/11/19 12:01       3.0 


 


2/11/19 11:55 97.7 78 18 124/73 (90) 98   

















Intake and Output  


 


 2/11/19 2/12/19





 19:00 07:00


 


Intake Total 535 ml 


 


Output Total  500 ml


 


Balance 535 ml -500 ml


 


  


 


Intake Oral 480 ml 


 


IV Total 55 ml 


 


Output Urine Total  500 ml


 


# Voids 2 


 


# Bowel Movements 1 








General Appearance:  no acute distress


HEENT:  atraumatic


Respiratory/Chest:  lungs clear


Cardiovascular:  regular rhythm


Abdomen:  soft, non tender


Extremities:  no edema


Laboratory Tests


2/12/19 06:25: 


White Blood Count 4.9, Red Blood Count 4.78, Hemoglobin 13.5, Hematocrit 41.3, 

Mean Corpuscular Volume 86, Mean Corpuscular Hemoglobin 28.2, Mean Corpuscular 

Hemoglobin Concent 32.7, Red Cell Distribution Width 12.9, Platelet Count 174, 

Mean Platelet Volume 7.8, Neutrophils (%) (Auto) 78.5H, Lymphocytes (%) (Auto) 

14.8L, Monocytes (%) (Auto) 6.0, Eosinophils (%) (Auto) 0.0, Basophils (%) (Auto

) 0.7, Sodium Level 141, Potassium Level 4.5, Chloride Level 104, Carbon 

Dioxide Level 30, Anion Gap 7, Blood Urea Nitrogen 15, Creatinine 0.6, Estimat 

Glomerular Filtration Rate > 60, Glucose Level 130H, Calcium Level 9.6, 

Hepatitis C Antibody [Pending], Hepatitis C RNA (PCR) IUs/ml [Pending], 

Hepatitis C RNA (PCR) log IUs/ml [Pending]





Current Medications








 Medications


  (Trade)  Dose


 Ordered  Sig/Mona


 Route


 PRN Reason  Start Time


 Stop Time Status Last Admin


Dose Admin


 


 Azithromycin


  (Zithromax)  250 mg  DAILY


 ORAL


   2/11/19 09:00


 2/14/19 08:59  2/12/19 09:06


 


 


 Ceftriaxone


 Sodium 1 gm/


 Dextrose  55 ml @ 


 110 mls/hr  Q24H


 IVPB


   2/11/19 14:30


 2/13/19 14:29  2/11/19 13:50


 


 


 Famotidine


  (Pepcid)  20 mg  DAILY


 ORAL


   2/11/19 09:00


 3/10/19 08:59  2/12/19 09:06


 


 


 Haloperidol


 Lactate


  (Haldol)  5 mg  Q6H  PRN


 IM


 Agitation  2/11/19 08:00


 3/10/19 19:59   


 


 


 Heparin Sodium


  (Porcine)


  (Heparin 5000


 units/ml)  5,000 units  EVERY 12  HOURS


 SUBQ


   2/11/19 09:00


 3/9/19 20:59  2/12/19 09:08


 


 


 Ibuprofen


  (Advil)  400 mg  Q6H  PRN


 ORAL


 For Pain  2/11/19 08:45


 3/7/19 20:44   


 


 


 Lorazepam


  (Ativan 2mg/ml


 1ml)  1 mg  Q4H  PRN


 IM


 For Anxiety  2/11/19 08:00


 2/15/19 19:59   


 


 


 Prednisone


  (predniSONE)  60 mg  Q24H


 ORAL


   2/11/19 18:00


 3/10/19 17:59  2/11/19 17:16


 

















Benji Rodriguez MD Feb 12, 2019 11:31

## 2019-02-12 NOTE — GI PROGRESS NOTE
Assessment/Plan


Problems:  


(1) Transaminitis


ICD Codes:  R74.0 - Nonspecific elevation of levels of transaminase and lactic 

acid dehydrogenase [LDH]


SNOMED:  417172447, 958277563


(2) COPD exacerbation


ICD Codes:  J44.1 - Chronic obstructive pulmonary disease with (acute) 

exacerbation


SNOMED:  352220952


Status:  stable


Status Narrative


Discussed with Dr. Ndiaye


Assessment/Plan


Transaminitis resolved


Patient now off BiPAP


Hepatitis panel reviewed.  Patient positive for hepatitis C





Symptomatic treatment


okay to advance to regular diet, tolerating


Zofran as needed


ppi


fu labs


Outpatient GI procedures, outpatient hepatitis C tx





The patient was seen and examined at bedside and all new and available data was 

reviewed in the patients chart. I agree with the above findings, impression 

and plan.  (Patient seen earlier today. Signature stamp does not reflect 

patient encounter time.). - Juan Miguel Ndiaye MD





Subjective


Subjective


Denies any abdominal pain


Denies any nausea or vomiting


Denies any constipation or diarrhea


Tolerating food





Objective





Last 24 Hour Vital Signs








  Date Time  Temp Pulse Resp B/P (MAP) Pulse Ox O2 Delivery O2 Flow Rate FiO2


 


2/12/19 12:00 98.2 109 20 131/79 (96) 100   


 


2/12/19 09:00      Nasal Cannula 2.0 


 


2/12/19 09:00      Bi-pap  


 


2/12/19 08:00 96.8 82 18 124/76 (92) 93   


 


2/12/19 05:10  81 16  94   


 


2/12/19 04:00        30


 


2/12/19 04:00 97.5 74 20 132/75 (94) 93   


 


2/12/19 02:30  72 16  93 Facial  30


 


2/12/19 01:30  75 18  94 Facial  30


 


2/12/19 00:00        30


 


2/12/19 00:00 98.0 59 20 125/74 (91) 100   


 


2/11/19 23:27  70 16  93 Facial  30


 


2/11/19 21:55  77 20  94 Facial  30


 


2/11/19 21:00      Bi-pap  


 


2/11/19 20:00 98.4 89 18 130/72 (91) 92   


 


2/11/19 20:00       3.0 


 


2/11/19 16:03       3.0 


 


2/11/19 16:00 98.1 84 18 124/76 (92) 94   


 


2/11/19 15:26  81 18  99 Nasal Cannula 3.0 32

















Intake and Output  


 


 2/11/19 2/12/19





 18:59 06:59


 


Intake Total 535 ml 


 


Output Total  500 ml


 


Balance 535 ml -500 ml


 


  


 


Intake Oral 480 ml 


 


IV Total 55 ml 


 


Output Urine Total  500 ml


 


# Voids 2 


 


# Bowel Movements 1 











Laboratory Tests








Test


  2/12/19


06:25


 


White Blood Count


  4.9 K/UL


(4.8-10.8)


 


Red Blood Count


  4.78 M/UL


(4.20-5.40)


 


Hemoglobin


  13.5 G/DL


(12.0-16.0)


 


Hematocrit


  41.3 %


(37.0-47.0)


 


Mean Corpuscular Volume 86 FL (80-99)  


 


Mean Corpuscular Hemoglobin


  28.2 PG


(27.0-31.0)


 


Mean Corpuscular Hemoglobin


Concent 32.7 G/DL


(32.0-36.0)


 


Red Cell Distribution Width


  12.9 %


(11.6-14.8)


 


Platelet Count


  174 K/UL


(150-450)


 


Mean Platelet Volume


  7.8 FL


(6.5-10.1)


 


Neutrophils (%) (Auto)


  78.5 %


(45.0-75.0)  H


 


Lymphocytes (%) (Auto)


  14.8 %


(20.0-45.0)  L


 


Monocytes (%) (Auto)


  6.0 %


(1.0-10.0)


 


Eosinophils (%) (Auto)


  0.0 %


(0.0-3.0)


 


Basophils (%) (Auto)


  0.7 %


(0.0-2.0)


 


Sodium Level


  141 MMOL/L


(136-145)


 


Potassium Level


  4.5 MMOL/L


(3.5-5.1)


 


Chloride Level


  104 MMOL/L


()


 


Carbon Dioxide Level


  30 MMOL/L


(21-32)


 


Anion Gap


  7 mmol/L


(5-15)


 


Blood Urea Nitrogen


  15 mg/dL


(7-18)


 


Creatinine


  0.6 MG/DL


(0.55-1.30)


 


Estimat Glomerular Filtration


Rate > 60 mL/min


(>60)


 


Glucose Level


  130 MG/DL


()  H


 


Calcium Level


  9.6 MG/DL


(8.5-10.1)


 


Hepatitis C Antibody Pending  


 


Hepatitis C RNA (PCR) IUs/ml Pending  


 


Hepatitis C RNA (PCR) log


IUs/ml Pending  


 








Height (Feet):  5


Height (Inches):  5.00


Weight (Pounds):  123


General Appearance:  WD/WN, no apparent distress, alert


Cardiovascular:  normal rate


Respiratory/Chest:  normal breath sounds, no respiratory distress


Abdominal Exam:  normal bowel sounds, non tender, soft


Extremities:  normal range of motion, non-tender











Stacy Hernandez NP Feb 12, 2019 15:05

## 2019-02-12 NOTE — NUR
NURSE NOTES:

 Patient is alert  to name, respirations are unlabored ,N o complaints at this time,call 
light within reach,bed alarm is on.

## 2019-02-12 NOTE — GENERAL PROGRESS NOTE
Assessment/Plan


Problem List:  


(1) Acute metabolic encephalopathy


ICD Codes:  G93.41 - Metabolic encephalopathy


SNOMED:  63296677, 145339966


Assessment/Plan


haldol im


ativan im


cont bilat restraints





Subjective


Neurologic/Psychiatric:  Reports: anxiety, depressed, emotional problems


Allergies:  


Coded Allergies:  


     No Known Allergies (Unverified , 2/5/19)





Objective





Last 24 Hour Vital Signs








  Date Time  Temp Pulse Resp B/P (MAP) Pulse Ox O2 Delivery O2 Flow Rate FiO2


 


2/12/19 05:10  81 16  94   


 


2/12/19 04:00        30


 


2/12/19 04:00 97.5 74 20 132/75 (94) 93   


 


2/12/19 02:30  72 16  93 Facial  30


 


2/12/19 01:30  75 18  94 Facial  30


 


2/12/19 00:00        30


 


2/12/19 00:00 98.0 59 20 125/74 (91) 100   


 


2/11/19 23:27  70 16  93 Facial  30


 


2/11/19 21:55  77 20  94 Facial  30


 


2/11/19 21:00      Bi-pap  


 


2/11/19 20:00 98.4 89 18 130/72 (91) 92   


 


2/11/19 20:00       3.0 


 


2/11/19 16:03       3.0 


 


2/11/19 16:00 98.1 84 18 124/76 (92) 94   


 


2/11/19 15:26  81 18  99 Nasal Cannula 3.0 32


 


2/11/19 15:02  80 18  92 Nasal Cannula 3.0 32

















Intake and Output  


 


 2/11/19 2/12/19





 18:59 06:59


 


Intake Total 535 ml 


 


Output Total  500 ml


 


Balance 535 ml -500 ml


 


  


 


Intake Oral 480 ml 


 


IV Total 55 ml 


 


Output Urine Total  500 ml


 


# Voids 2 


 


# Bowel Movements 1 








Laboratory Tests


2/12/19 06:25: 


White Blood Count 4.9, Red Blood Count 4.78, Hemoglobin 13.5, Hematocrit 41.3, 

Mean Corpuscular Volume 86, Mean Corpuscular Hemoglobin 28.2, Mean Corpuscular 

Hemoglobin Concent 32.7, Red Cell Distribution Width 12.9, Platelet Count 174, 

Mean Platelet Volume 7.8, Neutrophils (%) (Auto) 78.5H, Lymphocytes (%) (Auto) 

14.8L, Monocytes (%) (Auto) 6.0, Eosinophils (%) (Auto) 0.0, Basophils (%) (Auto

) 0.7, Sodium Level 141, Potassium Level 4.5, Chloride Level 104, Carbon 

Dioxide Level 30, Anion Gap 7, Blood Urea Nitrogen 15, Creatinine 0.6, Estimat 

Glomerular Filtration Rate > 60, Glucose Level 130H, Calcium Level 9.6, 

Hepatitis C Antibody [Pending], Hepatitis C RNA (PCR) IUs/ml [Pending], 

Hepatitis C RNA (PCR) log IUs/ml [Pending]


Height (Feet):  5


Height (Inches):  5.00


Weight (Pounds):  123











Eddie Irizarry MD Feb 12, 2019 12:54

## 2019-02-12 NOTE — NUR
NURSE NOTES:

Pt is in bed, awake and verbal. No acute distress noted. IV site on right arm is patent and 
intact. Pt was assisted to the bathroom to void.Pt is more calm and cooperative. Bilat soft 
wrist restraints were removed. Bed low in position,side rails up and call light within 
reach. Pt will be monitored. Pt has A discharge order to SNF.

## 2019-02-12 NOTE — NUR
NURSE NOTES:

Per conversation with Meka, called [facility] and talked to Franklin. Asked 
to fax over Pt's medication sheet. Fax  number provided

## 2019-02-12 NOTE — INFECTIOUS DISEASES PROG NOTE
Assessment/Plan


Assessment/Plan


A:


1. COPD exacerbation,


2. Hypercapnic respiratory failure.


3. Homelessness.


4. Elevated transaminase, likely hepatitis C


5. Leukocytosis resolved


6. ? pneumonia


P:


Continue Rocephin & Zithromax





Subjective


ROS Limited/Unobtainable:  Yes


Constitutional:  Reports: other - doing beter


Respiratory:  Reports: no symptoms


Cardiovascular:  Reports: no symptoms


Gastrointestinal/Abdominal:  Reports: no symptoms


Genitourinary:  Reports: no symptoms


Allergies:  


Coded Allergies:  


     No Known Allergies (Unverified , 2/5/19)





Objective


Vital Signs





Last 24 Hour Vital Signs








  Date Time  Temp Pulse Resp B/P (MAP) Pulse Ox O2 Delivery O2 Flow Rate FiO2


 


2/12/19 09:00      Nasal Cannula 2.0 


 


2/12/19 09:00      Bi-pap  


 


2/12/19 05:10  81 16  94   


 


2/12/19 04:00        30


 


2/12/19 04:00 97.5 74 20 132/75 (94) 93   


 


2/12/19 02:30  72 16  93 Facial  30


 


2/12/19 01:30  75 18  94 Facial  30


 


2/12/19 00:00        30


 


2/12/19 00:00 98.0 59 20 125/74 (91) 100   


 


2/11/19 23:27  70 16  93 Facial  30


 


2/11/19 21:55  77 20  94 Facial  30


 


2/11/19 21:00      Bi-pap  


 


2/11/19 20:00 98.4 89 18 130/72 (91) 92   


 


2/11/19 20:00       3.0 


 


2/11/19 16:03       3.0 


 


2/11/19 16:00 98.1 84 18 124/76 (92) 94   


 


2/11/19 15:26  81 18  99 Nasal Cannula 3.0 32


 


2/11/19 15:02  80 18  92 Nasal Cannula 3.0 32








Height (Feet):  5


Height (Inches):  5.00


Weight (Pounds):  123


HEENT:  mucous membranes moist


Respiratory/Chest:  lungs clear


Cardiovascular:  normal rate


Abdomen:  soft, non tender


Extremities:  no edema


Neurologic/Psychiatric:  alert, responsive





Laboratory Tests








Test


  2/12/19


06:25


 


White Blood Count


  4.9 K/UL


(4.8-10.8)


 


Red Blood Count


  4.78 M/UL


(4.20-5.40)


 


Hemoglobin


  13.5 G/DL


(12.0-16.0)


 


Hematocrit


  41.3 %


(37.0-47.0)


 


Mean Corpuscular Volume 86 FL (80-99)  


 


Mean Corpuscular Hemoglobin


  28.2 PG


(27.0-31.0)


 


Mean Corpuscular Hemoglobin


Concent 32.7 G/DL


(32.0-36.0)


 


Red Cell Distribution Width


  12.9 %


(11.6-14.8)


 


Platelet Count


  174 K/UL


(150-450)


 


Mean Platelet Volume


  7.8 FL


(6.5-10.1)


 


Neutrophils (%) (Auto)


  78.5 %


(45.0-75.0)  H


 


Lymphocytes (%) (Auto)


  14.8 %


(20.0-45.0)  L


 


Monocytes (%) (Auto)


  6.0 %


(1.0-10.0)


 


Eosinophils (%) (Auto)


  0.0 %


(0.0-3.0)


 


Basophils (%) (Auto)


  0.7 %


(0.0-2.0)


 


Sodium Level


  141 MMOL/L


(136-145)


 


Potassium Level


  4.5 MMOL/L


(3.5-5.1)


 


Chloride Level


  104 MMOL/L


()


 


Carbon Dioxide Level


  30 MMOL/L


(21-32)


 


Anion Gap


  7 mmol/L


(5-15)


 


Blood Urea Nitrogen


  15 mg/dL


(7-18)


 


Creatinine


  0.6 MG/DL


(0.55-1.30)


 


Estimat Glomerular Filtration


Rate > 60 mL/min


(>60)


 


Glucose Level


  130 MG/DL


()  H


 


Calcium Level


  9.6 MG/DL


(8.5-10.1)


 


Hepatitis C Antibody Pending  


 


Hepatitis C RNA (PCR) IUs/ml Pending  


 


Hepatitis C RNA (PCR) log


IUs/ml Pending  


 











Current Medications








 Medications


  (Trade)  Dose


 Ordered  Sig/Mona


 Route


 PRN Reason  Start Time


 Stop Time Status Last Admin


Dose Admin


 


 Azithromycin


  (Zithromax)  250 mg  DAILY


 ORAL


   2/11/19 09:00


 2/14/19 08:59  2/12/19 09:06


 


 


 Ceftriaxone


 Sodium 1 gm/


 Dextrose  55 ml @ 


 110 mls/hr  Q24H


 IVPB


   2/11/19 14:30


 2/13/19 14:29  2/11/19 13:50


 


 


 Famotidine


  (Pepcid)  20 mg  DAILY


 ORAL


   2/11/19 09:00


 3/10/19 08:59  2/12/19 09:06


 


 


 Haloperidol


 Lactate


  (Haldol)  5 mg  Q6H  PRN


 IM


 Agitation  2/11/19 08:00


 3/10/19 19:59   


 


 


 Heparin Sodium


  (Porcine)


  (Heparin 5000


 units/ml)  5,000 units  EVERY 12  HOURS


 SUBQ


   2/11/19 09:00


 3/9/19 20:59  2/12/19 09:08


 


 


 Ibuprofen


  (Advil)  400 mg  Q6H  PRN


 ORAL


 For Pain  2/11/19 08:45


 3/7/19 20:44   


 


 


 Lorazepam


  (Ativan 2mg/ml


 1ml)  1 mg  Q4H  PRN


 IM


 For Anxiety  2/11/19 08:00


 2/15/19 19:59   


 


 


 Prednisone


  (predniSONE)  60 mg  Q24H


 ORAL


   2/11/19 18:00


 3/10/19 17:59  2/11/19 17:16


 

















Elijah Lewis MD Feb 12, 2019 13:14

## 2019-02-12 NOTE — NUR
NURSE NOTES:

Called and spoke to Franklin, asked to fax Pt's medication list. Verbalized understanding and 
promised to fax it over

## 2019-02-12 NOTE — NUR
Social Service Note



SHANTAL met with patient to reassess mentation.  Patient states her name is Melia Rios date 
of birth [birth year].  Patient is unable to provide year of birth.  SHANTAL contacted missing person's 
[phone].  Patient was listed as missing.  SHANTAL spoke with Tino Ferrer [phone] and 
obtained .  Patient residing at [facility] 
[phone].  SHANTAL spoke with Franklin who states patient has been missing over a week.  Franklin 
will have staff member visit patient to confirm identity.  Patient has a history of psych 
and has resided at facility for 7 years.  # date of birth [birth year].  Franklin 
states patient's sister Dixie Rios [phone] has not been in contact with patient in 
years.  SHANTAL unable to reach her at this time.  Patient's information to be updated by 
admitting.  Will monitor

## 2019-02-12 NOTE — NUR
ST NOTE: SWALLOW STATUS



PT SEEN AT BEDSIDE IN PM. ASLEEP. PT WITH NC(2L).

PER CNA, PT TOLERATED CURRENT DIET, LIQUIFIED PUREED, LIKE NECTAR THICK 

SOUP CONSISTENCY WITH NECTAR THICK LIQUIDS WITHOUT OVERT S/S OF ASPIRATION.

PT MET PO INTAKE GOALS, AROUND 75%. '



D/W THE STAFF.

## 2019-02-12 NOTE — GENERAL PROGRESS NOTE
Assessment/Plan


Problem List:  


(1) COPD exacerbation


ICD Codes:  J44.1 - Chronic obstructive pulmonary disease with (acute) 

exacerbation


SNOMED:  088728688


(2) Transaminitis


ICD Codes:  R74.0 - Nonspecific elevation of levels of transaminase and lactic 

acid dehydrogenase [LDH]


SNOMED:  390253458, 699246803


Status:  progressing


Assessment/Plan


copd improving


psych patient


afebrile


reviewed chart and labs


no wheezing


vitals stable





Subjective


ROS Limited/Unobtainable:  Yes


Allergies:  


Coded Allergies:  


     No Known Allergies (Unverified , 2/5/19)





Objective





Last 24 Hour Vital Signs








  Date Time  Temp Pulse Resp B/P (MAP) Pulse Ox O2 Delivery O2 Flow Rate FiO2


 


2/12/19 20:00 98.4 81 18 124/83 (97) 98   


 


2/12/19 16:00 98.8 82 20 130/88 (102) 95   


 


2/12/19 12:00 98.2 109 20 131/79 (96) 100   


 


2/12/19 09:00      Nasal Cannula 2.0 


 


2/12/19 09:00      Bi-pap  


 


2/12/19 08:00 96.8 82 18 124/76 (92) 93   


 


2/12/19 05:10  81 16  94   


 


2/12/19 04:00        30


 


2/12/19 04:00 97.5 74 20 132/75 (94) 93   


 


2/12/19 02:30  72 16  93 Facial  30


 


2/12/19 01:30  75 18  94 Facial  30


 


2/12/19 00:00        30


 


2/12/19 00:00 98.0 59 20 125/74 (91) 100   


 


2/11/19 23:27  70 16  93 Facial  30


 


2/11/19 21:55  77 20  94 Facial  30

















Intake and Output  


 


 2/11/19 2/12/19





 19:00 07:00


 


Intake Total 535 ml 


 


Output Total  500 ml


 


Balance 535 ml -500 ml


 


  


 


Intake Oral 480 ml 


 


IV Total 55 ml 


 


Output Urine Total  500 ml


 


# Voids 2 


 


# Bowel Movements 1 








Laboratory Tests


2/12/19 06:25: 


White Blood Count 4.9, Red Blood Count 4.78, Hemoglobin 13.5, Hematocrit 41.3, 

Mean Corpuscular Volume 86, Mean Corpuscular Hemoglobin 28.2, Mean Corpuscular 

Hemoglobin Concent 32.7, Red Cell Distribution Width 12.9, Platelet Count 174, 

Mean Platelet Volume 7.8, Neutrophils (%) (Auto) 78.5H, Lymphocytes (%) (Auto) 

14.8L, Monocytes (%) (Auto) 6.0, Eosinophils (%) (Auto) 0.0, Basophils (%) (Auto

) 0.7, Sodium Level 141, Potassium Level 4.5, Chloride Level 104, Carbon 

Dioxide Level 30, Anion Gap 7, Blood Urea Nitrogen 15, Creatinine 0.6, Estimat 

Glomerular Filtration Rate > 60, Glucose Level 130H, Calcium Level 9.6, 

Hepatitis C Antibody [Pending], Hepatitis C RNA (PCR) IUs/ml [Pending], 

Hepatitis C RNA (PCR) log IUs/ml [Pending]


Height (Feet):  5


Height (Inches):  5.00


Weight (Pounds):  123


Neck:  supple


Cardiovascular:  normal rate


Respiratory/Chest:  lungs clear


Abdomen:  soft











Melia Coombs MD Feb 12, 2019 21:37

## 2019-02-13 VITALS — SYSTOLIC BLOOD PRESSURE: 126 MMHG | DIASTOLIC BLOOD PRESSURE: 71 MMHG

## 2019-02-13 VITALS — SYSTOLIC BLOOD PRESSURE: 128 MMHG | DIASTOLIC BLOOD PRESSURE: 80 MMHG

## 2019-02-13 VITALS — DIASTOLIC BLOOD PRESSURE: 66 MMHG | SYSTOLIC BLOOD PRESSURE: 114 MMHG

## 2019-02-13 VITALS — DIASTOLIC BLOOD PRESSURE: 79 MMHG | SYSTOLIC BLOOD PRESSURE: 111 MMHG

## 2019-02-13 VITALS — SYSTOLIC BLOOD PRESSURE: 118 MMHG | DIASTOLIC BLOOD PRESSURE: 66 MMHG

## 2019-02-13 VITALS — DIASTOLIC BLOOD PRESSURE: 80 MMHG | SYSTOLIC BLOOD PRESSURE: 136 MMHG

## 2019-02-13 LAB
ADD MANUAL DIFF: NO
ANION GAP SERPL CALC-SCNC: 4 MMOL/L (ref 5–15)
BASOPHILS NFR BLD AUTO: 0.9 % (ref 0–2)
BUN SERPL-MCNC: 20 MG/DL (ref 7–18)
CALCIUM SERPL-MCNC: 9.1 MG/DL (ref 8.5–10.1)
CHLORIDE SERPL-SCNC: 103 MMOL/L (ref 98–107)
CO2 SERPL-SCNC: 33 MMOL/L (ref 21–32)
CREAT SERPL-MCNC: 0.8 MG/DL (ref 0.55–1.3)
EOSINOPHIL NFR BLD AUTO: 0.1 % (ref 0–3)
ERYTHROCYTE [DISTWIDTH] IN BLOOD BY AUTOMATED COUNT: 13.2 % (ref 11.6–14.8)
HCT VFR BLD CALC: 42.6 % (ref 37–47)
HGB BLD-MCNC: 13.7 G/DL (ref 12–16)
LYMPHOCYTES NFR BLD AUTO: 15.8 % (ref 20–45)
MCV RBC AUTO: 88 FL (ref 80–99)
MONOCYTES NFR BLD AUTO: 6.4 % (ref 1–10)
NEUTROPHILS NFR BLD AUTO: 76.8 % (ref 45–75)
PLATELET # BLD: 170 K/UL (ref 150–450)
POTASSIUM SERPL-SCNC: 4.4 MMOL/L (ref 3.5–5.1)
RBC # BLD AUTO: 4.82 M/UL (ref 4.2–5.4)
SODIUM SERPL-SCNC: 140 MMOL/L (ref 136–145)
WBC # BLD AUTO: 5.5 K/UL (ref 4.8–10.8)

## 2019-02-13 RX ADMIN — AZITHROMYCIN DIHYDRATE SCH MG: 250 TABLET, FILM COATED ORAL at 08:18

## 2019-02-13 RX ADMIN — HEPARIN SODIUM SCH UNITS: 5000 INJECTION INTRAVENOUS; SUBCUTANEOUS at 08:14

## 2019-02-13 RX ADMIN — HEPARIN SODIUM SCH UNITS: 5000 INJECTION INTRAVENOUS; SUBCUTANEOUS at 21:07

## 2019-02-13 NOTE — GENERAL PROGRESS NOTE
Assessment/Plan


Problem List:  


(1) Acute metabolic encephalopathy


ICD Codes:  G93.41 - Metabolic encephalopathy


SNOMED:  35427853, 849497409


Assessment/Plan


haldol im


ativan im


risperdal 2mg po qhs


cont bilat restraints





Subjective


Neurologic/Psychiatric:  Reports: anxiety, depressed, emotional problems


Allergies:  


Coded Allergies:  


     No Known Allergies (Unverified , 2/5/19)


Subjective


more alert but still disorganized and disoriented.





Objective





Last 24 Hour Vital Signs








  Date Time  Temp Pulse Resp B/P (MAP) Pulse Ox O2 Delivery O2 Flow Rate FiO2


 


2/13/19 08:00 97.9 81 20 136/80 (98) 92   


 


2/13/19 04:00 97.8 74 18 118/66 (83) 92   


 


2/13/19 01:58  74 21  93 Facial  30


 


2/13/19 00:00 98.6 72 16 114/66 (82) 94   


 


2/12/19 22:30  70 18  92 Facial  30


 


2/12/19 21:00      Bi-pap  


 


2/12/19 21:00      Nasal Cannula 2.0 


 


2/12/19 20:00 98.4 81 18 124/83 (97) 98   


 


2/12/19 16:00 98.8 82 20 130/88 (102) 95   


 


2/12/19 12:00 98.2 109 20 131/79 (96) 100   

















Intake and Output  


 


 2/12/19 2/13/19





 19:00 07:00


 


Intake Total 720 ml 


 


Balance 720 ml 


 


  


 


Intake Oral 720 ml 


 


# Voids 5 4








Laboratory Tests


2/13/19 06:10: 


White Blood Count 5.5, Red Blood Count 4.82, Hemoglobin 13.7, Hematocrit 42.6, 

Mean Corpuscular Volume 88, Mean Corpuscular Hemoglobin 28.5, Mean Corpuscular 

Hemoglobin Concent 32.2, Red Cell Distribution Width 13.2, Platelet Count 170, 

Mean Platelet Volume 8.0, Neutrophils (%) (Auto) 76.8H, Lymphocytes (%) (Auto) 

15.8L, Monocytes (%) (Auto) 6.4, Eosinophils (%) (Auto) 0.1, Basophils (%) (Auto

) 0.9, Sodium Level 140, Potassium Level 4.4, Chloride Level 103, Carbon 

Dioxide Level 33H, Anion Gap 4L, Blood Urea Nitrogen 20H, Creatinine 0.8, 

Estimat Glomerular Filtration Rate > 60, Glucose Level 185H, Calcium Level 9.1


Height (Feet):  5


Height (Inches):  5.00


Weight (Pounds):  123


General Appearance:  alert, confused, agitated











Eddie Irizarry MD Feb 13, 2019 11:50

## 2019-02-13 NOTE — INFECTIOUS DISEASES PROG NOTE
Assessment/Plan


Assessment/Plan


A:


1. COPD exacerbation,


2. Hypercapnic respiratory failure.


3. Homelessness.


4. Elevated transaminase, likely hepatitis C


5. Leukocytosis resolved


6. ? pneumonia


P:


Discontinue Rocephin & Zithromax


Observe off antibiotic


Agree with discharge to board & care





Subjective


ROS Limited/Unobtainable:  Yes


Respiratory:  Reports: no symptoms


Gastrointestinal/Abdominal:  Reports: no symptoms


Genitourinary:  Reports: no symptoms


Allergies:  


Coded Allergies:  


     No Known Allergies (Unverified , 2/5/19)





Objective


Vital Signs





Last 24 Hour Vital Signs








  Date Time  Temp Pulse Resp B/P (MAP) Pulse Ox O2 Delivery O2 Flow Rate FiO2


 


2/13/19 04:00 97.8 74 18 118/66 (83) 92   


 


2/13/19 01:58  74 21  93 Facial  30


 


2/13/19 00:00 98.6 72 16 114/66 (82) 94   


 


2/12/19 22:30  70 18  92 Facial  30


 


2/12/19 21:00      Bi-pap  


 


2/12/19 21:00      Nasal Cannula 2.0 


 


2/12/19 20:00 98.4 81 18 124/83 (97) 98   


 


2/12/19 16:00 98.8 82 20 130/88 (102) 95   


 


2/12/19 12:00 98.2 109 20 131/79 (96) 100   








Height (Feet):  5


Height (Inches):  5.00


Weight (Pounds):  123


General Appearance:  no acute distress


HEENT:  mucous membranes moist


Respiratory/Chest:  lungs clear


Cardiovascular:  normal rate


Abdomen:  soft, non tender


Extremities:  no edema


Neurologic/Psychiatric:  alert, responsive





Laboratory Tests








Test


  2/13/19


06:10


 


White Blood Count


  5.5 K/UL


(4.8-10.8)


 


Red Blood Count


  4.82 M/UL


(4.20-5.40)


 


Hemoglobin


  13.7 G/DL


(12.0-16.0)


 


Hematocrit


  42.6 %


(37.0-47.0)


 


Mean Corpuscular Volume 88 FL (80-99)  


 


Mean Corpuscular Hemoglobin


  28.5 PG


(27.0-31.0)


 


Mean Corpuscular Hemoglobin


Concent 32.2 G/DL


(32.0-36.0)


 


Red Cell Distribution Width


  13.2 %


(11.6-14.8)


 


Platelet Count


  170 K/UL


(150-450)


 


Mean Platelet Volume


  8.0 FL


(6.5-10.1)


 


Neutrophils (%) (Auto)


  76.8 %


(45.0-75.0)  H


 


Lymphocytes (%) (Auto)


  15.8 %


(20.0-45.0)  L


 


Monocytes (%) (Auto)


  6.4 %


(1.0-10.0)


 


Eosinophils (%) (Auto)


  0.1 %


(0.0-3.0)


 


Basophils (%) (Auto)


  0.9 %


(0.0-2.0)


 


Sodium Level


  140 MMOL/L


(136-145)


 


Potassium Level


  4.4 MMOL/L


(3.5-5.1)


 


Chloride Level


  103 MMOL/L


()


 


Carbon Dioxide Level


  33 MMOL/L


(21-32)  H


 


Anion Gap


  4 mmol/L


(5-15)  L


 


Blood Urea Nitrogen


  20 mg/dL


(7-18)  H


 


Creatinine


  0.8 MG/DL


(0.55-1.30)


 


Estimat Glomerular Filtration


Rate > 60 mL/min


(>60)


 


Glucose Level


  185 MG/DL


()  H


 


Calcium Level


  9.1 MG/DL


(8.5-10.1)











Current Medications








 Medications


  (Trade)  Dose


 Ordered  Sig/Mona


 Route


 PRN Reason  Start Time


 Stop Time Status Last Admin


Dose Admin


 


 Azithromycin


  (Zithromax)  250 mg  DAILY


 ORAL


   2/11/19 09:00


 2/14/19 08:59  2/13/19 08:18


 


 


 Ceftriaxone


 Sodium 1 gm/


 Dextrose  55 ml @ 


 110 mls/hr  Q24H


 IVPB


   2/11/19 14:30


 2/13/19 14:29  2/12/19 16:24


 


 


 Famotidine


  (Pepcid)  20 mg  DAILY


 ORAL


   2/11/19 09:00


 3/10/19 08:59  2/13/19 08:18


 


 


 Haloperidol


 Lactate


  (Haldol)  5 mg  Q6H  PRN


 IM


 Agitation  2/11/19 08:00


 3/10/19 19:59   


 


 


 Heparin Sodium


  (Porcine)


  (Heparin 5000


 units/ml)  5,000 units  EVERY 12  HOURS


 SUBQ


   2/11/19 09:00


 3/9/19 20:59  2/12/19 09:08


 


 


 Ibuprofen


  (Advil)  400 mg  Q6H  PRN


 ORAL


 For Pain  2/11/19 08:45


 3/7/19 20:44   


 


 


 Lorazepam


  (Ativan 2mg/ml


 1ml)  1 mg  Q4H  PRN


 IM


 For Anxiety  2/11/19 08:00


 2/15/19 19:59   


 


 


 Prednisone


  (predniSONE)  60 mg  Q24H


 ORAL


   2/11/19 18:00


 3/10/19 17:59  2/12/19 18:50


 

















Elijah Lewis MD Feb 13, 2019 11:10

## 2019-02-13 NOTE — NUR
RD ASSESSMENT & RECOMMENDATIONS

SEE CARE ACTIVITY FOR COMPLETE ASSESSMENT



DAILY ESTIMATED NEEDS:

Needs based on Pulmonary/ 54kg 

25-30  kcals/kg 

3808-0644  total kcals

1-1.5  g protein/kg

54-81  g total protein 

25-30  mL/kg

0757-8203  total fluid mLs



NUTRITION DIAGNOSIS:

* Swallowing difficulty R/T dysphagia, respiratory status as evidenced by

SLP recommends liquify pureed, NTL texture, pt on BIPAP at night time.

* Altered nutrition related lab values R/T steroidal med as evidenced by

elev BGs (185 130 149), pt on prednisone.





CURRENT DIET:REGULAR, liquify pureed, NTL    





PO DIET RECOMMENDATIONS:

Consider CCHO MED diet while on steroidal med/ texture per SLP 





ADDITIONAL RECOMMENDATIONS:

* Calibrated bedscale wt for accurate CBW 

* Monitor BGs closely while on steroidal med, need for hypoglycemic agents 

* A1C for eval of glycemic control 

* Glucerna 1 tetra nannette TID while on liquify pureed texture

## 2019-02-13 NOTE — NUR
REHAB MED PT NOTE



CONSULT RECEIVED, EVAL COMPLTED, PATIENT WILL BENEFIT FROM SKILLED PT 

DURING STAY FOR RETURN TO PLOF. RECOMMEND SNF AT [place]. PLAN OF CARE 

INITIATED. 



CHETNA HENDERSON PT DPT

## 2019-02-13 NOTE — GENERAL PROGRESS NOTE
Assessment/Plan


Problem List:  


(1) COPD exacerbation


ICD Codes:  J44.1 - Chronic obstructive pulmonary disease with (acute) 

exacerbation


SNOMED:  827221860


(2) Transaminitis


ICD Codes:  R74.0 - Nonspecific elevation of levels of transaminase and lactic 

acid dehydrogenase [LDH]


SNOMED:  271097853, 377977031


Status:  progressing


Assessment/Plan


copd improving


psych patient


no change


afebrile


lft was improved


vitals stable





Subjective


ROS Limited/Unobtainable:  Yes


Allergies:  


Coded Allergies:  


     No Known Allergies (Unverified , 2/5/19)





Objective





Last 24 Hour Vital Signs








  Date Time  Temp Pulse Resp B/P (MAP) Pulse Ox O2 Delivery O2 Flow Rate FiO2


 


2/13/19 20:21      Nasal Cannula 2.0 


 


2/13/19 20:11 98.0 81 18 111/79 (90) 97   


 


2/13/19 16:00 98.0 78 18 128/80 (96) 97   


 


2/13/19 12:00 97.5 80 18 126/71 (89) 97   


 


2/13/19 09:00      Bi-pap  


 


2/13/19 09:00      Nasal Cannula 2.0 


 


2/13/19 08:00 97.9 81 20 136/80 (98) 92   


 


2/13/19 04:00 97.8 74 18 118/66 (83) 92   


 


2/13/19 01:58  74 21  93 Facial  30


 


2/13/19 00:00 98.6 72 16 114/66 (82) 94   


 


2/12/19 22:30  70 18  92 Facial  30

















Intake and Output  


 


 2/12/19 2/13/19





 18:59 06:59


 


Intake Total 720 ml 


 


Balance 720 ml 


 


  


 


Intake Oral 720 ml 


 


# Voids 5 4








Laboratory Tests


2/13/19 06:10: 


White Blood Count 5.5, Red Blood Count 4.82, Hemoglobin 13.7, Hematocrit 42.6, 

Mean Corpuscular Volume 88, Mean Corpuscular Hemoglobin 28.5, Mean Corpuscular 

Hemoglobin Concent 32.2, Red Cell Distribution Width 13.2, Platelet Count 170, 

Mean Platelet Volume 8.0, Neutrophils (%) (Auto) 76.8H, Lymphocytes (%) (Auto) 

15.8L, Monocytes (%) (Auto) 6.4, Eosinophils (%) (Auto) 0.1, Basophils (%) (Auto

) 0.9, Sodium Level 140, Potassium Level 4.4, Chloride Level 103, Carbon 

Dioxide Level 33H, Anion Gap 4L, Blood Urea Nitrogen 20H, Creatinine 0.8, 

Estimat Glomerular Filtration Rate > 60, Glucose Level 185H, Calcium Level 9.1


Height (Feet):  5


Height (Inches):  5.00


Weight (Pounds):  123


Neck:  supple


Cardiovascular:  normal rate


Respiratory/Chest:  lungs clear


Abdomen:  soft











Melia Coombs MD Feb 13, 2019 21:21

## 2019-02-13 NOTE — PULMONOLOGY PROGRESS NOTE
Assessment/Plan


Assessment/Plan


1. Acute hypoxemic and hypercapnic respiratory failure


2. COPD w/ acute exacerbation


3. Weakness/fatigue


4. Transaminitis





Plan:


-monitor off prednisone


-Monitor LFTs


-abx: ceftriaxone/azithro


-f/u cultures


-monitor mental status


-d/c planning, will see intermittently, please call with any questions





Subjective


ROS Limited/Unobtainable:  Yes


Interval Events:  cough, denies shortness of breath


Allergies:  


Coded Allergies:  


     No Known Allergies (Unverified , 2/5/19)





Objective





Last 24 Hour Vital Signs








  Date Time  Temp Pulse Resp B/P (MAP) Pulse Ox O2 Delivery O2 Flow Rate FiO2


 


2/13/19 12:00 97.5 80 18 126/71 (89) 97   


 


2/13/19 09:00      Bi-pap  


 


2/13/19 09:00      Nasal Cannula 2.0 


 


2/13/19 08:00 97.9 81 20 136/80 (98) 92   


 


2/13/19 04:00 97.8 74 18 118/66 (83) 92   


 


2/13/19 01:58  74 21  93 Facial  30


 


2/13/19 00:00 98.6 72 16 114/66 (82) 94   


 


2/12/19 22:30  70 18  92 Facial  30


 


2/12/19 21:00      Bi-pap  


 


2/12/19 21:00      Nasal Cannula 2.0 


 


2/12/19 20:00 98.4 81 18 124/83 (97) 98   


 


2/12/19 16:00 98.8 82 20 130/88 (102) 95   

















Intake and Output  


 


 2/12/19 2/13/19





 18:59 06:59


 


Intake Total 720 ml 


 


Balance 720 ml 


 


  


 


Intake Oral 720 ml 


 


# Voids 5 4








General Appearance:  no acute distress


HEENT:  atraumatic


Respiratory/Chest:  crackles/rales


Cardiovascular:  normal rate, regular rhythm


Abdomen:  soft, non tender


Extremities:  no edema


Laboratory Tests


2/13/19 06:10: 


White Blood Count 5.5, Red Blood Count 4.82, Hemoglobin 13.7, Hematocrit 42.6, 

Mean Corpuscular Volume 88, Mean Corpuscular Hemoglobin 28.5, Mean Corpuscular 

Hemoglobin Concent 32.2, Red Cell Distribution Width 13.2, Platelet Count 170, 

Mean Platelet Volume 8.0, Neutrophils (%) (Auto) 76.8H, Lymphocytes (%) (Auto) 

15.8L, Monocytes (%) (Auto) 6.4, Eosinophils (%) (Auto) 0.1, Basophils (%) (Auto

) 0.9, Sodium Level 140, Potassium Level 4.4, Chloride Level 103, Carbon 

Dioxide Level 33H, Anion Gap 4L, Blood Urea Nitrogen 20H, Creatinine 0.8, 

Estimat Glomerular Filtration Rate > 60, Glucose Level 185H, Calcium Level 9.1





Current Medications








 Medications


  (Trade)  Dose


 Ordered  Sig/Mona


 Route


 PRN Reason  Start Time


 Stop Time Status Last Admin


Dose Admin


 


 Famotidine


  (Pepcid)  20 mg  DAILY


 ORAL


   2/11/19 09:00


 3/10/19 08:59  2/13/19 08:18


 


 


 Haloperidol


 Lactate


  (Haldol)  5 mg  Q6H  PRN


 IM


 Agitation  2/11/19 08:00


 3/10/19 19:59   


 


 


 Heparin Sodium


  (Porcine)


  (Heparin 5000


 units/ml)  5,000 units  EVERY 12  HOURS


 SUBQ


   2/11/19 09:00


 3/9/19 20:59  2/12/19 09:08


 


 


 Ibuprofen


  (Advil)  400 mg  Q6H  PRN


 ORAL


 For Pain  2/11/19 08:45


 3/7/19 20:44   


 


 


 Lorazepam


  (Ativan 2mg/ml


 1ml)  1 mg  Q4H  PRN


 IM


 For Anxiety  2/11/19 08:00


 2/15/19 19:59   


 


 


 Prednisone


  (predniSONE)  60 mg  Q24H


 ORAL


   2/11/19 18:00


 3/10/19 17:59  2/12/19 18:50


 


 


 Risperidone


  (RisperDAL)  2 mg  QHS


 ORAL


   2/13/19 21:00


 3/15/19 20:59   


 

















Benji Rodriguez MD Feb 13, 2019 15:08

## 2019-02-13 NOTE — NUR
*-*INSURANCE *-*



ALL CLINICALS AND REVIEWS FAXED TO:



[facility]

NCM:PENDING

P:[phone]

F:[phone]

-------------------------------------------------------------------------------

Addendum: 02/13/19 at 0915 by MARY COLÓN CM

-------------------------------------------------------------------------------

REF#[id number]

## 2019-02-13 NOTE — GI PROGRESS NOTE
Assessment/Plan


Problems:  


(1) Transaminitis


ICD Codes:  R74.0 - Nonspecific elevation of levels of transaminase and lactic 

acid dehydrogenase [LDH]


SNOMED:  015908207, 922314781


(2) COPD exacerbation


ICD Codes:  J44.1 - Chronic obstructive pulmonary disease with (acute) 

exacerbation


SNOMED:  314673210


Status:  stable


Status Narrative


Discussed with Dr. Ndiaye


Assessment/Plan


Transaminitis resolved


Patient now off BiPAP


Hepatitis panel reviewed.  Patient positive for hepatitis C





Symptomatic treatment


okay to advance to regular diet, tolerating


Zofran as needed


ppi


fu labs


Outpatient GI procedures, outpatient hepatitis C tx





The patient was seen and examined at bedside and all new and available data was 

reviewed in the patients chart. I agree with the above findings, impression 

and plan.  (Patient seen earlier today. Signature stamp does not reflect 

patient encounter time.). - Juan Miguel Ndiaye MD





Subjective


Subjective


Denies any abdominal pain


Denies any nausea or vomiting


Denies any constipation or diarrhea


Tolerating food





Objective





Last 24 Hour Vital Signs








  Date Time  Temp Pulse Resp B/P (MAP) Pulse Ox O2 Delivery O2 Flow Rate FiO2


 


2/13/19 04:00 97.8 74 18 118/66 (83) 92   


 


2/13/19 01:58  74 21  93 Facial  30


 


2/13/19 00:00 98.6 72 16 114/66 (82) 94   


 


2/12/19 22:30  70 18  92 Facial  30


 


2/12/19 21:00      Bi-pap  


 


2/12/19 21:00      Nasal Cannula 2.0 


 


2/12/19 20:00 98.4 81 18 124/83 (97) 98   


 


2/12/19 16:00 98.8 82 20 130/88 (102) 95   


 


2/12/19 12:00 98.2 109 20 131/79 (96) 100   

















Intake and Output  


 


 2/12/19 2/13/19





 19:00 07:00


 


Intake Total 720 ml 


 


Balance 720 ml 


 


  


 


Intake Oral 720 ml 


 


# Voids 5 4











Laboratory Tests








Test


  2/13/19


06:10


 


White Blood Count


  5.5 K/UL


(4.8-10.8)


 


Red Blood Count


  4.82 M/UL


(4.20-5.40)


 


Hemoglobin


  13.7 G/DL


(12.0-16.0)


 


Hematocrit


  42.6 %


(37.0-47.0)


 


Mean Corpuscular Volume 88 FL (80-99)  


 


Mean Corpuscular Hemoglobin


  28.5 PG


(27.0-31.0)


 


Mean Corpuscular Hemoglobin


Concent 32.2 G/DL


(32.0-36.0)


 


Red Cell Distribution Width


  13.2 %


(11.6-14.8)


 


Platelet Count


  170 K/UL


(150-450)


 


Mean Platelet Volume


  8.0 FL


(6.5-10.1)


 


Neutrophils (%) (Auto)


  76.8 %


(45.0-75.0)  H


 


Lymphocytes (%) (Auto)


  15.8 %


(20.0-45.0)  L


 


Monocytes (%) (Auto)


  6.4 %


(1.0-10.0)


 


Eosinophils (%) (Auto)


  0.1 %


(0.0-3.0)


 


Basophils (%) (Auto)


  0.9 %


(0.0-2.0)


 


Sodium Level


  140 MMOL/L


(136-145)


 


Potassium Level


  4.4 MMOL/L


(3.5-5.1)


 


Chloride Level


  103 MMOL/L


()


 


Carbon Dioxide Level


  33 MMOL/L


(21-32)  H


 


Anion Gap


  4 mmol/L


(5-15)  L


 


Blood Urea Nitrogen


  20 mg/dL


(7-18)  H


 


Creatinine


  0.8 MG/DL


(0.55-1.30)


 


Estimat Glomerular Filtration


Rate > 60 mL/min


(>60)


 


Glucose Level


  185 MG/DL


()  H


 


Calcium Level


  9.1 MG/DL


(8.5-10.1)








Height (Feet):  5


Height (Inches):  5.00


Weight (Pounds):  123


General Appearance:  WD/WN, no apparent distress, alert, thin


Cardiovascular:  normal rate


Respiratory/Chest:  normal breath sounds, no respiratory distress


Abdominal Exam:  normal bowel sounds, non tender, soft


Extremities:  normal range of motion, non-tender











Stacy Hernandez NP Feb 13, 2019 11:08

## 2019-02-13 NOTE — NUR
NURSE NOTES:

Patient is awake and alert,respirations are  unlabored,patient sitting up in bed and eating 
breakfast.Call light within reach,bed alarm is on.

## 2019-02-14 VITALS — DIASTOLIC BLOOD PRESSURE: 86 MMHG | SYSTOLIC BLOOD PRESSURE: 120 MMHG

## 2019-02-14 VITALS — DIASTOLIC BLOOD PRESSURE: 78 MMHG | SYSTOLIC BLOOD PRESSURE: 125 MMHG

## 2019-02-14 VITALS — DIASTOLIC BLOOD PRESSURE: 79 MMHG | SYSTOLIC BLOOD PRESSURE: 111 MMHG

## 2019-02-14 VITALS — SYSTOLIC BLOOD PRESSURE: 142 MMHG | DIASTOLIC BLOOD PRESSURE: 77 MMHG

## 2019-02-14 VITALS — DIASTOLIC BLOOD PRESSURE: 68 MMHG | SYSTOLIC BLOOD PRESSURE: 106 MMHG

## 2019-02-14 VITALS — DIASTOLIC BLOOD PRESSURE: 82 MMHG | SYSTOLIC BLOOD PRESSURE: 124 MMHG

## 2019-02-14 LAB
ADD MANUAL DIFF: NO
ALBUMIN SERPL-MCNC: 2.5 G/DL (ref 3.4–5)
ALBUMIN/GLOB SERPL: 0.7 {RATIO} (ref 1–2.7)
ALP SERPL-CCNC: 58 U/L (ref 46–116)
ALT SERPL-CCNC: 48 U/L (ref 12–78)
ANION GAP SERPL CALC-SCNC: 3 MMOL/L (ref 5–15)
AST SERPL-CCNC: 35 U/L (ref 15–37)
BASOPHILS NFR BLD AUTO: 0.5 % (ref 0–2)
BILIRUB SERPL-MCNC: 0.5 MG/DL (ref 0.2–1)
BUN SERPL-MCNC: 17 MG/DL (ref 7–18)
CALCIUM SERPL-MCNC: 9.5 MG/DL (ref 8.5–10.1)
CHLORIDE SERPL-SCNC: 104 MMOL/L (ref 98–107)
CO2 SERPL-SCNC: 33 MMOL/L (ref 21–32)
CREAT SERPL-MCNC: 0.6 MG/DL (ref 0.55–1.3)
EOSINOPHIL NFR BLD AUTO: 0.2 % (ref 0–3)
ERYTHROCYTE [DISTWIDTH] IN BLOOD BY AUTOMATED COUNT: 13 % (ref 11.6–14.8)
GLOBULIN SER-MCNC: 3.6 G/DL
HCT VFR BLD CALC: 42.5 % (ref 37–47)
HGB BLD-MCNC: 13.7 G/DL (ref 12–16)
LYMPHOCYTES NFR BLD AUTO: 15.5 % (ref 20–45)
MCV RBC AUTO: 88 FL (ref 80–99)
MONOCYTES NFR BLD AUTO: 6.1 % (ref 1–10)
NEUTROPHILS NFR BLD AUTO: 77.8 % (ref 45–75)
PLATELET # BLD: 202 K/UL (ref 150–450)
POTASSIUM SERPL-SCNC: 4.3 MMOL/L (ref 3.5–5.1)
RBC # BLD AUTO: 4.86 M/UL (ref 4.2–5.4)
SODIUM SERPL-SCNC: 140 MMOL/L (ref 136–145)
WBC # BLD AUTO: 5.8 K/UL (ref 4.8–10.8)

## 2019-02-14 RX ADMIN — HEPARIN SODIUM SCH UNITS: 5000 INJECTION INTRAVENOUS; SUBCUTANEOUS at 20:14

## 2019-02-14 RX ADMIN — HEPARIN SODIUM SCH UNITS: 5000 INJECTION INTRAVENOUS; SUBCUTANEOUS at 08:30

## 2019-02-14 NOTE — GENERAL PROGRESS NOTE
Assessment/Plan


Problem List:  


(1) Acute metabolic encephalopathy


ICD Codes:  G93.41 - Metabolic encephalopathy


SNOMED:  95657302, 506929494


Status:  stable


Assessment/Plan


haldol im


ativan im


risperdal 2mg po qhs


cont bilat restraints





Subjective


Neurologic/Psychiatric:  Reports: anxiety, depressed, emotional problems


Allergies:  


Coded Allergies:  


     No Known Allergies (Unverified , 2/5/19)


Subjective


 alert but still disorganized and disoriented.





Objective





Last 24 Hour Vital Signs








  Date Time  Temp Pulse Resp B/P (MAP) Pulse Ox O2 Delivery O2 Flow Rate FiO2


 


2/14/19 12:00 97.9 82 18 124/82 (96) 100   


 


2/14/19 08:10      Nasal Cannula 2.0 28


 


2/14/19 08:10     99 Nasal Cannula 2.0 28


 


2/14/19 07:49      Nasal Cannula 2.0 


 


2/14/19 07:45 97.9 110 18 106/68 (81) 100   


 


2/14/19 04:00 97.9 79 18 125/78 (94) 100   


 


2/14/19 01:25  75 19  92 Facial  30


 


2/14/19 00:00 98.2 78 18 111/79 (90) 99   


 


2/13/19 23:28  79 16  93 Facial  30


 


2/13/19 20:21      Nasal Cannula 2.0 


 


2/13/19 20:11 98.0 81 18 111/79 (90) 97   


 


2/13/19 16:00 98.0 78 18 128/80 (96) 97   

















Intake and Output  


 


 2/13/19 2/14/19





 19:00 07:00


 


Intake Total 940 ml 


 


Balance 940 ml 


 


  


 


Intake Oral 940 ml 


 


# Voids 5 4








Laboratory Tests


2/14/19 06:15: 


White Blood Count 5.8, Red Blood Count 4.86, Hemoglobin 13.7, Hematocrit 42.5, 

Mean Corpuscular Volume 88, Mean Corpuscular Hemoglobin 28.2, Mean Corpuscular 

Hemoglobin Concent 32.2, Red Cell Distribution Width 13.0, Platelet Count 202, 

Mean Platelet Volume 8.1, Neutrophils (%) (Auto) 77.8H, Lymphocytes (%) (Auto) 

15.5L, Monocytes (%) (Auto) 6.1, Eosinophils (%) (Auto) 0.2, Basophils (%) (Auto

) 0.5, Sodium Level 140, Potassium Level 4.3, Chloride Level 104, Carbon 

Dioxide Level 33H, Anion Gap 3L, Blood Urea Nitrogen 17, Creatinine 0.6, 

Estimat Glomerular Filtration Rate > 60, Glucose Level 143H, Calcium Level 9.5, 

Total Bilirubin 0.5, Aspartate Amino Transf (AST/SGOT) 35, Alanine 

Aminotransferase (ALT/SGPT) 48, Alkaline Phosphatase 58, Total Protein 6.1L, 

Albumin 2.5L, Globulin 3.6, Albumin/Globulin Ratio 0.7L


Height (Feet):  5


Height (Inches):  5.00


Weight (Pounds):  123


General Appearance:  alert, moderate distress


Neurologic:  oriented x 3, responsive, depressed affect











Eddie Irizarry MD Feb 14, 2019 13:29

## 2019-02-14 NOTE — INFECTIOUS DISEASES PROG NOTE
Assessment/Plan


Assessment/Plan


A:


1. COPD exacerbation,


2. Hypercapnic respiratory failure.


3. Homelessness.


4. Elevated transaminase, likely hepatitis C


5. Leukocytosis resolved


6. ? pneumonia


P:


Observe off antibiotic


Agree with discharge to board & care





Subjective


ROS Limited/Unobtainable:  Yes


Constitutional:  Reports: no symptoms


Respiratory:  Reports: no symptoms


Allergies:  


Coded Allergies:  


     No Known Allergies (Unverified , 2/5/19)





Objective


Vital Signs





Last 24 Hour Vital Signs








  Date Time  Temp Pulse Resp B/P (MAP) Pulse Ox O2 Delivery O2 Flow Rate FiO2


 


2/14/19 12:00 97.9 82 18 124/82 (96) 100   


 


2/14/19 08:10      Nasal Cannula 2.0 28


 


2/14/19 08:10     99 Nasal Cannula 2.0 28


 


2/14/19 07:49      Nasal Cannula 2.0 


 


2/14/19 07:45 97.9 110 18 106/68 (81) 100   


 


2/14/19 04:00 97.9 79 18 125/78 (94) 100   


 


2/14/19 01:25  75 19  92 Facial  30


 


2/14/19 00:00 98.2 78 18 111/79 (90) 99   


 


2/13/19 23:28  79 16  93 Facial  30


 


2/13/19 20:21      Nasal Cannula 2.0 


 


2/13/19 20:11 98.0 81 18 111/79 (90) 97   


 


2/13/19 16:00 98.0 78 18 128/80 (96) 97   








Height (Feet):  5


Height (Inches):  5.00


Weight (Pounds):  123


General Appearance:  no acute distress


HEENT:  mucous membranes moist


Respiratory/Chest:  lungs clear


Cardiovascular:  normal rate


Abdomen:  soft, non tender


Extremities:  no edema


Neurologic/Psychiatric:  alert, responsive





Laboratory Tests








Test


  2/14/19


06:15


 


White Blood Count


  5.8 K/UL


(4.8-10.8)


 


Red Blood Count


  4.86 M/UL


(4.20-5.40)


 


Hemoglobin


  13.7 G/DL


(12.0-16.0)


 


Hematocrit


  42.5 %


(37.0-47.0)


 


Mean Corpuscular Volume 88 FL (80-99)  


 


Mean Corpuscular Hemoglobin


  28.2 PG


(27.0-31.0)


 


Mean Corpuscular Hemoglobin


Concent 32.2 G/DL


(32.0-36.0)


 


Red Cell Distribution Width


  13.0 %


(11.6-14.8)


 


Platelet Count


  202 K/UL


(150-450)


 


Mean Platelet Volume


  8.1 FL


(6.5-10.1)


 


Neutrophils (%) (Auto)


  77.8 %


(45.0-75.0)  H


 


Lymphocytes (%) (Auto)


  15.5 %


(20.0-45.0)  L


 


Monocytes (%) (Auto)


  6.1 %


(1.0-10.0)


 


Eosinophils (%) (Auto)


  0.2 %


(0.0-3.0)


 


Basophils (%) (Auto)


  0.5 %


(0.0-2.0)


 


Sodium Level


  140 MMOL/L


(136-145)


 


Potassium Level


  4.3 MMOL/L


(3.5-5.1)


 


Chloride Level


  104 MMOL/L


()


 


Carbon Dioxide Level


  33 MMOL/L


(21-32)  H


 


Anion Gap


  3 mmol/L


(5-15)  L


 


Blood Urea Nitrogen


  17 mg/dL


(7-18)


 


Creatinine


  0.6 MG/DL


(0.55-1.30)


 


Estimat Glomerular Filtration


Rate > 60 mL/min


(>60)


 


Glucose Level


  143 MG/DL


()  H


 


Calcium Level


  9.5 MG/DL


(8.5-10.1)


 


Total Bilirubin


  0.5 MG/DL


(0.2-1.0)


 


Aspartate Amino Transf


(AST/SGOT) 35 U/L (15-37)


 


 


Alanine Aminotransferase


(ALT/SGPT) 48 U/L (12-78)


 


 


Alkaline Phosphatase


  58 U/L


()


 


Total Protein


  6.1 G/DL


(6.4-8.2)  L


 


Albumin


  2.5 G/DL


(3.4-5.0)  L


 


Globulin 3.6 g/dL  


 


Albumin/Globulin Ratio


  0.7 (1.0-2.7)


L











Current Medications








 Medications


  (Trade)  Dose


 Ordered  Sig/Mona


 Route


 PRN Reason  Start Time


 Stop Time Status Last Admin


Dose Admin


 


 Famotidine


  (Pepcid)  20 mg  DAILY


 ORAL


   2/11/19 09:00


 3/10/19 08:59  2/14/19 08:29


 


 


 Haloperidol


 Lactate


  (Haldol)  5 mg  Q6H  PRN


 IM


 Agitation  2/11/19 08:00


 3/10/19 19:59   


 


 


 Heparin Sodium


  (Porcine)


  (Heparin 5000


 units/ml)  5,000 units  EVERY 12  HOURS


 SUBQ


   2/11/19 09:00


 3/9/19 20:59  2/14/19 08:30


 


 


 Ibuprofen


  (Advil)  400 mg  Q6H  PRN


 ORAL


 For Pain  2/11/19 08:45


 3/7/19 20:44   


 


 


 Lorazepam


  (Ativan 2mg/ml


 1ml)  1 mg  Q4H  PRN


 IM


 For Anxiety  2/11/19 08:00


 2/15/19 19:59   


 


 


 Prednisone


  (predniSONE)  60 mg  Q24H


 ORAL


   2/11/19 18:00


 3/10/19 17:59  2/13/19 18:28


 


 


 Risperidone


  (RisperDAL)  2 mg  QHS


 ORAL


   2/13/19 21:00


 3/15/19 20:59  2/13/19 21:06


 

















Elijah Lewis MD Feb 14, 2019 12:53

## 2019-02-14 NOTE — GENERAL PROGRESS NOTE
Assessment/Plan


Problem List:  


(1) COPD exacerbation


ICD Codes:  J44.1 - Chronic obstructive pulmonary disease with (acute) 

exacerbation


SNOMED:  031551520


(2) Transaminitis


ICD Codes:  R74.0 - Nonspecific elevation of levels of transaminase and lactic 

acid dehydrogenase [LDH]


SNOMED:  987537909, 461363235


Status:  progressing


Assessment/Plan


copd improving


agitated at times





lft was improved


no acute events





Subjective


ROS Limited/Unobtainable:  Yes


Allergies:  


Coded Allergies:  


     No Known Allergies (Unverified , 2/5/19)





Objective





Last 24 Hour Vital Signs








  Date Time  Temp Pulse Resp B/P (MAP) Pulse Ox O2 Delivery O2 Flow Rate FiO2


 


2/14/19 08:10      Nasal Cannula 2.0 28


 


2/14/19 08:10     99 Nasal Cannula 2.0 28


 


2/14/19 07:49      Nasal Cannula 2.0 


 


2/14/19 07:45 97.9 110 18 106/68 (81) 100   


 


2/14/19 04:00 97.9 79 18 125/78 (94) 100   


 


2/14/19 01:25  75 19  92 Facial  30


 


2/14/19 00:00 98.2 78 18 111/79 (90) 99   


 


2/13/19 23:28  79 16  93 Facial  30


 


2/13/19 20:21      Nasal Cannula 2.0 


 


2/13/19 20:11 98.0 81 18 111/79 (90) 97   


 


2/13/19 16:00 98.0 78 18 128/80 (96) 97   


 


2/13/19 12:00 97.5 80 18 126/71 (89) 97   

















Intake and Output  


 


 2/13/19 2/14/19





 19:00 07:00


 


Intake Total 940 ml 


 


Balance 940 ml 


 


  


 


Intake Oral 940 ml 


 


# Voids 5 4








Laboratory Tests


2/14/19 06:15: 


White Blood Count 5.8, Red Blood Count 4.86, Hemoglobin 13.7, Hematocrit 42.5, 

Mean Corpuscular Volume 88, Mean Corpuscular Hemoglobin 28.2, Mean Corpuscular 

Hemoglobin Concent 32.2, Red Cell Distribution Width 13.0, Platelet Count 202, 

Mean Platelet Volume 8.1, Neutrophils (%) (Auto) 77.8H, Lymphocytes (%) (Auto) 

15.5L, Monocytes (%) (Auto) 6.1, Eosinophils (%) (Auto) 0.2, Basophils (%) (Auto

) 0.5, Sodium Level 140, Potassium Level 4.3, Chloride Level 104, Carbon 

Dioxide Level 33H, Anion Gap 3L, Blood Urea Nitrogen 17, Creatinine 0.6, 

Estimat Glomerular Filtration Rate > 60, Glucose Level 143H, Calcium Level 9.5, 

Total Bilirubin 0.5, Aspartate Amino Transf (AST/SGOT) 35, Alanine 

Aminotransferase (ALT/SGPT) 48, Alkaline Phosphatase 58, Total Protein 6.1L, 

Albumin 2.5L, Globulin 3.6, Albumin/Globulin Ratio 0.7L


Height (Feet):  5


Height (Inches):  5.00


Weight (Pounds):  123


Neck:  supple


Cardiovascular:  normal rate


Respiratory/Chest:  lungs clear


Abdomen:  soft











Melia Coombs MD Feb 14, 2019 11:04

## 2019-02-14 NOTE — NUR
PT NOTE:

Attempted to see patient for PT treatment. Patient declining to participate with PT at this 
time, states she wants to sleep. Patient's request respected, RN notified, will re-attempt 
tomorrow.

## 2019-02-14 NOTE — NUR
CASE MANAGEMENT: REVIEW



2/14/2019



SI: COPD EXACERBATION. PNA.

T 97.9 HR 62 RR 18 B/P 120/86 SATS 100% ON 2L/NC 

CO2 33 





IS: PREDNISONE PO QD

HEPARIN SUBQ Q12H

AZITHROMYCIN PO QD

ALBUTEROL HHN Q6H

CEFTRIAXONE IV Q24H





***MED/SURG STATUS**

## 2019-02-14 NOTE — NUR
NURSE NOTES:

pt awake alert, no distress. call light within reach. bed in lowest position, locked. will 
monitor.

## 2019-02-14 NOTE — GI PROGRESS NOTE
Assessment/Plan


Problems:  


(1) Transaminitis


ICD Codes:  R74.0 - Nonspecific elevation of levels of transaminase and lactic 

acid dehydrogenase [LDH]


SNOMED:  870167970, 182896169


(2) COPD exacerbation


ICD Codes:  J44.1 - Chronic obstructive pulmonary disease with (acute) 

exacerbation


SNOMED:  005757130


Status:  stable


Status Narrative


Discussed with Dr. Ndiaye


Assessment/Plan


Transaminitis resolved


Patient now off BiPAP


Hepatitis panel reviewed.  Patient positive for hepatitis C





Symptomatic treatment


okay to advance to regular diet, tolerating


Zofran as needed


ppi


fu labs


PT evaluation


Outpatient GI procedures, outpatient hepatitis C tx





The patient was seen and examined at bedside and all new and available data was 

reviewed in the patients chart. I agree with the above findings, impression 

and plan.  (Patient seen earlier today. Signature stamp does not reflect 

patient encounter time.). - Juan Miguel Ndiaye MD





Subjective


Subjective


Denies any abdominal pain


Denies any nausea or vomiting


Denies any constipation or diarrhea


Tolerating food





Objective





Last 24 Hour Vital Signs








  Date Time  Temp Pulse Resp B/P (MAP) Pulse Ox O2 Delivery O2 Flow Rate FiO2


 


2/14/19 08:10      Nasal Cannula 2.0 28


 


2/14/19 08:10     99 Nasal Cannula 2.0 28


 


2/14/19 07:49      Nasal Cannula 2.0 


 


2/14/19 07:45 97.9 110 18 106/68 (81) 100   


 


2/14/19 04:00 97.9 79 18 125/78 (94) 100   


 


2/14/19 01:25  75 19  92 Facial  30


 


2/14/19 00:00 98.2 78 18 111/79 (90) 99   


 


2/13/19 23:28  79 16  93 Facial  30


 


2/13/19 20:21      Nasal Cannula 2.0 


 


2/13/19 20:11 98.0 81 18 111/79 (90) 97   


 


2/13/19 16:00 98.0 78 18 128/80 (96) 97   


 


2/13/19 12:00 97.5 80 18 126/71 (89) 97   

















Intake and Output  


 


 2/13/19 2/14/19





 19:00 07:00


 


Intake Total 940 ml 


 


Balance 940 ml 


 


  


 


Intake Oral 940 ml 


 


# Voids 5 4











Laboratory Tests








Test


  2/14/19


06:15


 


White Blood Count


  5.8 K/UL


(4.8-10.8)


 


Red Blood Count


  4.86 M/UL


(4.20-5.40)


 


Hemoglobin


  13.7 G/DL


(12.0-16.0)


 


Hematocrit


  42.5 %


(37.0-47.0)


 


Mean Corpuscular Volume 88 FL (80-99)  


 


Mean Corpuscular Hemoglobin


  28.2 PG


(27.0-31.0)


 


Mean Corpuscular Hemoglobin


Concent 32.2 G/DL


(32.0-36.0)


 


Red Cell Distribution Width


  13.0 %


(11.6-14.8)


 


Platelet Count


  202 K/UL


(150-450)


 


Mean Platelet Volume


  8.1 FL


(6.5-10.1)


 


Neutrophils (%) (Auto)


  77.8 %


(45.0-75.0)  H


 


Lymphocytes (%) (Auto)


  15.5 %


(20.0-45.0)  L


 


Monocytes (%) (Auto)


  6.1 %


(1.0-10.0)


 


Eosinophils (%) (Auto)


  0.2 %


(0.0-3.0)


 


Basophils (%) (Auto)


  0.5 %


(0.0-2.0)


 


Sodium Level


  140 MMOL/L


(136-145)


 


Potassium Level


  4.3 MMOL/L


(3.5-5.1)


 


Chloride Level


  104 MMOL/L


()


 


Carbon Dioxide Level


  33 MMOL/L


(21-32)  H


 


Anion Gap


  3 mmol/L


(5-15)  L


 


Blood Urea Nitrogen


  17 mg/dL


(7-18)


 


Creatinine


  0.6 MG/DL


(0.55-1.30)


 


Estimat Glomerular Filtration


Rate > 60 mL/min


(>60)


 


Glucose Level


  143 MG/DL


()  H


 


Calcium Level


  9.5 MG/DL


(8.5-10.1)


 


Total Bilirubin


  0.5 MG/DL


(0.2-1.0)


 


Aspartate Amino Transf


(AST/SGOT) 35 U/L (15-37)


 


 


Alanine Aminotransferase


(ALT/SGPT) 48 U/L (12-78)


 


 


Alkaline Phosphatase


  58 U/L


()


 


Total Protein


  6.1 G/DL


(6.4-8.2)  L


 


Albumin


  2.5 G/DL


(3.4-5.0)  L


 


Globulin 3.6 g/dL  


 


Albumin/Globulin Ratio


  0.7 (1.0-2.7)


L








Height (Feet):  5


Height (Inches):  5.00


Weight (Pounds):  123


General Appearance:  WD/WN, no apparent distress, alert


Cardiovascular:  normal rate


Respiratory/Chest:  normal breath sounds, no respiratory distress


Abdominal Exam:  normal bowel sounds, non tender, soft


Extremities:  normal range of motion, non-tender











Stacy Hernandez NP Feb 14, 2019 10:40

## 2019-02-15 VITALS — DIASTOLIC BLOOD PRESSURE: 78 MMHG | SYSTOLIC BLOOD PRESSURE: 137 MMHG

## 2019-02-15 VITALS — DIASTOLIC BLOOD PRESSURE: 86 MMHG | SYSTOLIC BLOOD PRESSURE: 147 MMHG

## 2019-02-15 VITALS — DIASTOLIC BLOOD PRESSURE: 70 MMHG | SYSTOLIC BLOOD PRESSURE: 128 MMHG

## 2019-02-15 VITALS — DIASTOLIC BLOOD PRESSURE: 61 MMHG | SYSTOLIC BLOOD PRESSURE: 102 MMHG

## 2019-02-15 VITALS — SYSTOLIC BLOOD PRESSURE: 123 MMHG | DIASTOLIC BLOOD PRESSURE: 71 MMHG

## 2019-02-15 VITALS — DIASTOLIC BLOOD PRESSURE: 69 MMHG | SYSTOLIC BLOOD PRESSURE: 124 MMHG

## 2019-02-15 LAB
ADD MANUAL DIFF: NO
ANION GAP SERPL CALC-SCNC: 4 MMOL/L (ref 5–15)
BASOPHILS NFR BLD AUTO: 0.2 % (ref 0–2)
BUN SERPL-MCNC: 18 MG/DL (ref 7–18)
CALCIUM SERPL-MCNC: 8.9 MG/DL (ref 8.5–10.1)
CHLORIDE SERPL-SCNC: 105 MMOL/L (ref 98–107)
CO2 SERPL-SCNC: 34 MMOL/L (ref 21–32)
CREAT SERPL-MCNC: 0.6 MG/DL (ref 0.55–1.3)
EOSINOPHIL NFR BLD AUTO: 0.1 % (ref 0–3)
ERYTHROCYTE [DISTWIDTH] IN BLOOD BY AUTOMATED COUNT: 12.7 % (ref 11.6–14.8)
HCT VFR BLD CALC: 39 % (ref 37–47)
HGB BLD-MCNC: 12.5 G/DL (ref 12–16)
LYMPHOCYTES NFR BLD AUTO: 17.8 % (ref 20–45)
MCV RBC AUTO: 88 FL (ref 80–99)
MONOCYTES NFR BLD AUTO: 6.6 % (ref 1–10)
NEUTROPHILS NFR BLD AUTO: 75.3 % (ref 45–75)
PLATELET # BLD: 213 K/UL (ref 150–450)
POTASSIUM SERPL-SCNC: 4.1 MMOL/L (ref 3.5–5.1)
RBC # BLD AUTO: 4.44 M/UL (ref 4.2–5.4)
SODIUM SERPL-SCNC: 143 MMOL/L (ref 136–145)
WBC # BLD AUTO: 5.5 K/UL (ref 4.8–10.8)

## 2019-02-15 RX ADMIN — HEPARIN SODIUM SCH UNITS: 5000 INJECTION INTRAVENOUS; SUBCUTANEOUS at 22:19

## 2019-02-15 RX ADMIN — HEPARIN SODIUM SCH UNITS: 5000 INJECTION INTRAVENOUS; SUBCUTANEOUS at 08:47

## 2019-02-15 NOTE — GENERAL PROGRESS NOTE
Assessment/Plan


Problem List:  


(1) Acute metabolic encephalopathy


ICD Codes:  G93.41 - Metabolic encephalopathy


SNOMED:  37188595, 081343934


Assessment/Plan


haldol im


ativan im


risperdal 2mg po qhs


cont bilat restraints





Subjective


Neurologic/Psychiatric:  Reports: anxiety, depressed, emotional problems


Allergies:  


Coded Allergies:  


     No Known Allergies (Unverified , 2/5/19)


Subjective


 alert but still disorganized and disoriented.





Objective





Last 24 Hour Vital Signs








  Date Time  Temp Pulse Resp B/P (MAP) Pulse Ox O2 Delivery O2 Flow Rate FiO2


 


2/15/19 16:00 98.0 72 20 147/86 (106) 97   


 


2/15/19 12:00 97.5 64 20 137/78 (97) 93   


 


2/15/19 08:46      Nasal Cannula 2.0 


 


2/15/19 08:00 97.6 92 20 123/71 (88) 96   


 


2/15/19 05:20  71 18  92 Facial  30


 


2/15/19 04:00 97.5 71 20 128/70 (89) 99   


 


2/15/19 03:27  69 17  93 Facial  30


 


2/15/19 01:30  65 16  94 Facial  30


 


2/15/19 00:16 98.0 70 20 102/61 (75) 95   


 


2/14/19 22:30  70 18  92 Facial  30

















Intake and Output  


 


 2/14/19 2/15/19





 19:00 07:00


 


Intake Total 680 ml 


 


Balance 680 ml 


 


  


 


Intake Oral 680 ml 


 


# Voids 3 3








Laboratory Tests


2/15/19 05:13: 


White Blood Count 5.5, Red Blood Count 4.44, Hemoglobin 12.5, Hematocrit 39.0, 

Mean Corpuscular Volume 88, Mean Corpuscular Hemoglobin 28.1, Mean Corpuscular 

Hemoglobin Concent 32.0, Red Cell Distribution Width 12.7, Platelet Count 213, 

Mean Platelet Volume 6.9, Neutrophils (%) (Auto) 75.3H, Lymphocytes (%) (Auto) 

17.8L, Monocytes (%) (Auto) 6.6, Eosinophils (%) (Auto) 0.1, Basophils (%) (Auto

) 0.2, Sodium Level 143, Potassium Level 4.1, Chloride Level 105, Carbon 

Dioxide Level 34H, Anion Gap 4L, Blood Urea Nitrogen 18, Creatinine 0.6, 

Estimat Glomerular Filtration Rate > 60, Glucose Level 124H, Calcium Level 8.9


Height (Feet):  5


Height (Inches):  5.00


Weight (Pounds):  123


General Appearance:  no apparent distress, alert, agitated











Eddie Irizarry MD Feb 15, 2019 20:25

## 2019-02-15 NOTE — NUR
RESPIRATORY NOTE:

pt requested to be on Bipap with current settings around 1000. took pt off bipap at 1220 and 
placed on 2L NC.

## 2019-02-15 NOTE — NUR
Social Service Note



SW spoke with [facility] Kayla [phone] regarding placement.  Clinicals faxed 
[phone].  Provider list of contracted facilities is not up to date.



No longer contracted with:

[facility]



Referrals faxed to

[facility] [phone] (p) [phone] (f) possible bed next week

[facility] [phone] (p) [phone] (f) possible bed next week

## 2019-02-15 NOTE — GENERAL PROGRESS NOTE
Assessment/Plan


Problem List:  


(1) COPD exacerbation


ICD Codes:  J44.1 - Chronic obstructive pulmonary disease with (acute) 

exacerbation


SNOMED:  418438385


(2) Transaminitis


ICD Codes:  R74.0 - Nonspecific elevation of levels of transaminase and lactic 

acid dehydrogenase [LDH]


SNOMED:  606591078, 504210352


Status:  progressing


Assessment/Plan


copd improving


agitated at times


needs placment


reviewed chart and labs





Subjective


ROS Limited/Unobtainable:  Yes


Allergies:  


Coded Allergies:  


     No Known Allergies (Unverified , 2/5/19)





Objective





Last 24 Hour Vital Signs








  Date Time  Temp Pulse Resp B/P (MAP) Pulse Ox O2 Delivery O2 Flow Rate FiO2


 


2/15/19 08:46      Nasal Cannula 2.0 


 


2/15/19 08:00 97.6 92 20 123/71 (88) 96   


 


2/15/19 05:20  71 18  92 Facial  30


 


2/15/19 04:00 97.5 71 20 128/70 (89) 99   


 


2/15/19 03:27  69 17  93 Facial  30


 


2/15/19 01:30  65 16  94 Facial  30


 


2/15/19 00:16 98.0 70 20 102/61 (75) 95   


 


2/14/19 22:30  70 18  92 Facial  30


 


2/14/19 20:03      Nasal Cannula 2.0 


 


2/14/19 20:00 97.9 80 20 142/77 (98) 99   


 


2/14/19 16:00 97.9 62 18 120/86 (97) 100   

















Intake and Output  


 


 2/14/19 2/15/19





 18:59 06:59


 


Intake Total 680 ml 


 


Balance 680 ml 


 


  


 


Intake Oral 680 ml 


 


# Voids 3 3








Laboratory Tests


2/15/19 05:13: 


White Blood Count 5.5, Red Blood Count 4.44, Hemoglobin 12.5, Hematocrit 39.0, 

Mean Corpuscular Volume 88, Mean Corpuscular Hemoglobin 28.1, Mean Corpuscular 

Hemoglobin Concent 32.0, Red Cell Distribution Width 12.7, Platelet Count 213, 

Mean Platelet Volume 6.9, Neutrophils (%) (Auto) 75.3H, Lymphocytes (%) (Auto) 

17.8L, Monocytes (%) (Auto) 6.6, Eosinophils (%) (Auto) 0.1, Basophils (%) (Auto

) 0.2, Sodium Level 143, Potassium Level 4.1, Chloride Level 105, Carbon 

Dioxide Level 34H, Anion Gap 4L, Blood Urea Nitrogen 18, Creatinine 0.6, 

Estimat Glomerular Filtration Rate > 60, Glucose Level 124H, Calcium Level 8.9


Height (Feet):  5


Height (Inches):  5.00


Weight (Pounds):  123


Cardiovascular:  normal rate


Respiratory/Chest:  lungs clear


Abdomen:  soft











Melia Coombs MD Feb 15, 2019 12:17

## 2019-02-15 NOTE — NUR
NURSE NOTES: RECEIVED PATIENT LYING IN BED, AWAKE, ALERT/ORIENTED X3, NOTED WITH PERIODS OF 
CONFUSION, DENIES PAIN. NO SIGNS AND SYMPTOMS OF ACUTE CARDIO RESPIRATORY DISTRESS/SHORTNESS 
OF BREATH, DENIES CHEST PAIN, NO PERIPHERAL EDEMA NOTED. NO REPORT OF GI DISTRESS, CONTINENT 
OF B/B. SIDE RAILS UP X3/BED IN LOWEST POSITION FOR SAFETY, ENCOURAGED PATIENT TO UTILIZE 
CALL LIGHT FOR ASSISTANCE, VERBALIZED UNDERSTANDING. NAD.

## 2019-02-15 NOTE — GENERAL PROGRESS NOTE
Assessment/Plan


Problem List:  


(1) Hepatitis C


ICD Codes:  B19.20 - Unspecified viral hepatitis C without hepatic coma


SNOMED:  54159037


(2) Transaminitis


ICD Codes:  R74.0 - Nonspecific elevation of levels of transaminase and lactic 

acid dehydrogenase [LDH]


SNOMED:  449693739, 354566862


(3) COPD exacerbation


ICD Codes:  J44.1 - Chronic obstructive pulmonary disease with (acute) 

exacerbation


SNOMED:  294909716


(4) Acute metabolic encephalopathy


ICD Codes:  G93.41 - Metabolic encephalopathy


SNOMED:  26117073, 942946427


Assessment/Plan


Transaminitis resolved


Patient now off BiPAP


Hepatitis panel reviewed.  Patient positive for hepatitis C





Symptomatic treatment


on soft diet


Zofran as needed


ppi


fu labs


PT evaluation


Outpatient GI procedures, outpatient hepatitis C tx





Subjective


ROS Limited/Unobtainable:  No


Allergies:  


Coded Allergies:  


     No Known Allergies (Unverified , 2/5/19)





Objective





Last 24 Hour Vital Signs








  Date Time  Temp Pulse Resp B/P (MAP) Pulse Ox O2 Delivery O2 Flow Rate FiO2


 


2/15/19 08:46      Nasal Cannula 2.0 


 


2/15/19 08:00 97.6 92 20 123/71 (88) 96   


 


2/15/19 05:20  71 18  92 Facial  30


 


2/15/19 04:00 97.5 71 20 128/70 (89) 99   


 


2/15/19 03:27  69 17  93 Facial  30


 


2/15/19 01:30  65 16  94 Facial  30


 


2/15/19 00:16 98.0 70 20 102/61 (75) 95   


 


2/14/19 22:30  70 18  92 Facial  30


 


2/14/19 20:03      Nasal Cannula 2.0 


 


2/14/19 20:00 97.9 80 20 142/77 (98) 99   


 


2/14/19 16:00 97.9 62 18 120/86 (97) 100   


 


2/14/19 12:00 97.9 82 18 124/82 (96) 100   

















Intake and Output  


 


 2/14/19 2/15/19





 18:59 06:59


 


Intake Total 680 ml 


 


Balance 680 ml 


 


  


 


Intake Oral 680 ml 


 


# Voids 3 3








Laboratory Tests


2/15/19 05:13: 


White Blood Count 5.5, Red Blood Count 4.44, Hemoglobin 12.5, Hematocrit 39.0, 

Mean Corpuscular Volume 88, Mean Corpuscular Hemoglobin 28.1, Mean Corpuscular 

Hemoglobin Concent 32.0, Red Cell Distribution Width 12.7, Platelet Count 213, 

Mean Platelet Volume 6.9, Neutrophils (%) (Auto) 75.3H, Lymphocytes (%) (Auto) 

17.8L, Monocytes (%) (Auto) 6.6, Eosinophils (%) (Auto) 0.1, Basophils (%) (Auto

) 0.2, Sodium Level 143, Potassium Level 4.1, Chloride Level 105, Carbon 

Dioxide Level 34H, Anion Gap 4L, Blood Urea Nitrogen 18, Creatinine 0.6, 

Estimat Glomerular Filtration Rate > 60, Glucose Level 124H, Calcium Level 8.9


Height (Feet):  5


Height (Inches):  5.00


Weight (Pounds):  123


General Appearance:  no apparent distress


EENT:  normal ENT inspection


Neck:  supple


Cardiovascular:  normal rate


Respiratory/Chest:  decreased breath sounds


Abdomen:  normal bowel sounds, non tender, soft


Extremities:  non-tender











Juan Miguel Ndiaye MD Feb 15, 2019 10:43

## 2019-02-15 NOTE — NUR
*-*INSURANCE *-*



ALL CLINICALS AND REVIEWS FAXED TO:



[facility]

NCM:PENDING

P:[phone]

F:[phone]

REF#[id number]

## 2019-02-15 NOTE — INFECTIOUS DISEASES PROG NOTE
Assessment/Plan


Assessment/Plan


A:


1. COPD exacerbation,


2. Hypercapnic respiratory failure.


3. Homelessness.


4. Elevated transaminase, likely hepatitis C


5. Leukocytosis resolved


6. ? pneumonia


P:


Observe off antibiotic


HIV test





Subjective


ROS Limited/Unobtainable:  Yes


Respiratory:  Reports: shortness of breath


Neurologic:  Reports: confusion


Allergies:  


Coded Allergies:  


     No Known Allergies (Unverified , 2/5/19)





Objective


Vital Signs





Last 24 Hour Vital Signs








  Date Time  Temp Pulse Resp B/P (MAP) Pulse Ox O2 Delivery O2 Flow Rate FiO2


 


2/15/19 08:46      Nasal Cannula 2.0 


 


2/15/19 08:00 97.6 92 20 123/71 (88) 96   


 


2/15/19 05:20  71 18  92 Facial  30


 


2/15/19 04:00 97.5 71 20 128/70 (89) 99   


 


2/15/19 03:27  69 17  93 Facial  30


 


2/15/19 01:30  65 16  94 Facial  30


 


2/15/19 00:16 98.0 70 20 102/61 (75) 95   


 


2/14/19 22:30  70 18  92 Facial  30


 


2/14/19 20:03      Nasal Cannula 2.0 


 


2/14/19 20:00 97.9 80 20 142/77 (98) 99   


 


2/14/19 16:00 97.9 62 18 120/86 (97) 100   








Height (Feet):  5


Height (Inches):  5.00


Weight (Pounds):  123


HEENT:  other - on BIPAP


Cardiovascular:  normal rate


Abdomen:  soft, non tender


Extremities:  no edema


Neurologic/Psychiatric:  alert, responsive





Laboratory Tests








Test


  2/15/19


05:13


 


White Blood Count


  5.5 K/UL


(4.8-10.8)


 


Red Blood Count


  4.44 M/UL


(4.20-5.40)


 


Hemoglobin


  12.5 G/DL


(12.0-16.0)


 


Hematocrit


  39.0 %


(37.0-47.0)


 


Mean Corpuscular Volume 88 FL (80-99)  


 


Mean Corpuscular Hemoglobin


  28.1 PG


(27.0-31.0)


 


Mean Corpuscular Hemoglobin


Concent 32.0 G/DL


(32.0-36.0)


 


Red Cell Distribution Width


  12.7 %


(11.6-14.8)


 


Platelet Count


  213 K/UL


(150-450)


 


Mean Platelet Volume


  6.9 FL


(6.5-10.1)


 


Neutrophils (%) (Auto)


  75.3 %


(45.0-75.0)  H


 


Lymphocytes (%) (Auto)


  17.8 %


(20.0-45.0)  L


 


Monocytes (%) (Auto)


  6.6 %


(1.0-10.0)


 


Eosinophils (%) (Auto)


  0.1 %


(0.0-3.0)


 


Basophils (%) (Auto)


  0.2 %


(0.0-2.0)


 


Sodium Level


  143 MMOL/L


(136-145)


 


Potassium Level


  4.1 MMOL/L


(3.5-5.1)


 


Chloride Level


  105 MMOL/L


()


 


Carbon Dioxide Level


  34 MMOL/L


(21-32)  H


 


Anion Gap


  4 mmol/L


(5-15)  L


 


Blood Urea Nitrogen


  18 mg/dL


(7-18)


 


Creatinine


  0.6 MG/DL


(0.55-1.30)


 


Estimat Glomerular Filtration


Rate > 60 mL/min


(>60)


 


Glucose Level


  124 MG/DL


()  H


 


Calcium Level


  8.9 MG/DL


(8.5-10.1)











Current Medications








 Medications


  (Trade)  Dose


 Ordered  Sig/Mona


 Route


 PRN Reason  Start Time


 Stop Time Status Last Admin


Dose Admin


 


 Famotidine


  (Pepcid)  20 mg  DAILY


 ORAL


   2/11/19 09:00


 3/10/19 08:59  2/15/19 08:42


 


 


 Haloperidol


 Lactate


  (Haldol)  5 mg  Q6H  PRN


 IM


 Agitation  2/11/19 08:00


 3/10/19 19:59   


 


 


 Heparin Sodium


  (Porcine)


  (Heparin 5000


 units/ml)  5,000 units  EVERY 12  HOURS


 SUBQ


   2/11/19 09:00


 3/9/19 20:59  2/15/19 08:47


 


 


 Ibuprofen


  (Advil)  400 mg  Q6H  PRN


 ORAL


 For Pain  2/11/19 08:45


 3/7/19 20:44   


 


 


 Lorazepam


  (Ativan 2mg/ml


 1ml)  1 mg  Q4H  PRN


 IM


 For Anxiety  2/11/19 08:00


 2/15/19 19:59   


 


 


 Prednisone


  (predniSONE)  60 mg  Q24H


 ORAL


   2/11/19 18:00


 3/10/19 17:59  2/14/19 17:01


 


 


 Risperidone


  (RisperDAL)  2 mg  QHS


 ORAL


   2/13/19 21:00


 3/15/19 20:59  2/14/19 20:13


 

















Elijah Lewis MD Feb 15, 2019 12:17

## 2019-02-15 NOTE — NUR
NURSE NOTES:

received patient A/A/Ox4, agitated, pacing and kept insisting for Bipap although bipap is 
due @ hs per RT. She is talkative. Excellent appetite. no pain/discomfort noted. kept bed in 
low position. call light is within reach. siderails are up x2. will cont to monitor.

## 2019-02-16 VITALS — SYSTOLIC BLOOD PRESSURE: 113 MMHG | DIASTOLIC BLOOD PRESSURE: 81 MMHG

## 2019-02-16 VITALS — DIASTOLIC BLOOD PRESSURE: 87 MMHG | SYSTOLIC BLOOD PRESSURE: 132 MMHG

## 2019-02-16 VITALS — SYSTOLIC BLOOD PRESSURE: 126 MMHG | DIASTOLIC BLOOD PRESSURE: 75 MMHG

## 2019-02-16 VITALS — SYSTOLIC BLOOD PRESSURE: 136 MMHG | DIASTOLIC BLOOD PRESSURE: 80 MMHG

## 2019-02-16 VITALS — SYSTOLIC BLOOD PRESSURE: 132 MMHG | DIASTOLIC BLOOD PRESSURE: 80 MMHG

## 2019-02-16 VITALS — DIASTOLIC BLOOD PRESSURE: 67 MMHG | SYSTOLIC BLOOD PRESSURE: 120 MMHG

## 2019-02-16 RX ADMIN — HEPARIN SODIUM SCH UNITS: 5000 INJECTION INTRAVENOUS; SUBCUTANEOUS at 08:54

## 2019-02-16 RX ADMIN — HEPARIN SODIUM SCH UNITS: 5000 INJECTION INTRAVENOUS; SUBCUTANEOUS at 20:50

## 2019-02-16 NOTE — GENERAL PROGRESS NOTE
Assessment/Plan


Problem List:  


(1) Acute metabolic encephalopathy


ICD Codes:  G93.41 - Metabolic encephalopathy


SNOMED:  27164427, 842772417


Assessment/Plan


haldol im


ativan im


risperdal 2mg po qhs


cont bilat restraints





Subjective


Neurologic/Psychiatric:  Reports: anxiety, depressed, emotional problems


Allergies:  


Coded Allergies:  


     No Known Allergies (Unverified , 2/5/19)


Subjective


 alert but still disorganized and disoriented.





Objective





Last 24 Hour Vital Signs








  Date Time  Temp Pulse Resp B/P (MAP) Pulse Ox O2 Delivery O2 Flow Rate FiO2


 


2/16/19 20:03      Nasal Cannula 2.0 


 


2/16/19 19:48 96.8 70 18 136/80 (98) 92   


 


2/16/19 16:00 97.8 70 18 113/81 (92) 94   


 


2/16/19 12:00 97.9 72 19 132/87 (102) 97   


 


2/16/19 09:00      Nasal Cannula 2.0 


 


2/16/19 08:00 97.3 102 19 126/75 (92) 99   


 


2/16/19 04:00 98.0 79 18 132/80 (97) 97   


 


2/16/19 01:21  79 21  95 Facial  30


 


2/16/19 00:00 97.6 83 18 120/67 (84) 96   


 


2/15/19 23:00  77 18  94 Facial  30


 


2/15/19 22:52      Nasal Cannula 2.0 

















Intake and Output  


 


 2/15/19 2/16/19





 19:00 07:00


 


Intake Total 960 ml 840 ml


 


Output Total  1200 ml


 


Balance 960 ml -360 ml


 


  


 


Intake Oral 960 ml 840 ml


 


Output Urine Total  1200 ml


 


# Voids 1 1


 


# Bowel Movements 1 








Laboratory Tests


2/16/19 04:00: HIV (1&2) Antibody Rapid Negative


Height (Feet):  5


Height (Inches):  5.00


Weight (Pounds):  123











Eddie Irizarry MD Feb 16, 2019 22:18

## 2019-02-16 NOTE — GENERAL PROGRESS NOTE
Assessment/Plan


Problem List:  


(1) COPD exacerbation


ICD Codes:  J44.1 - Chronic obstructive pulmonary disease with (acute) 

exacerbation


SNOMED:  394651976


(2) Transaminitis


ICD Codes:  R74.0 - Nonspecific elevation of levels of transaminase and lactic 

acid dehydrogenase [LDH]


SNOMED:  182186172, 330916250


Status:  progressing


Assessment/Plan


copd improving


afebrile


nac


needs placement


reviewed chart and labs





Subjective


ROS Limited/Unobtainable:  Yes


Allergies:  


Coded Allergies:  


     No Known Allergies (Unverified , 2/5/19)





Objective





Last 24 Hour Vital Signs








  Date Time  Temp Pulse Resp B/P (MAP) Pulse Ox O2 Delivery O2 Flow Rate FiO2


 


2/16/19 16:00 97.8 70 18 113/81 (92) 94   


 


2/16/19 12:00 97.9 72 19 132/87 (102) 97   


 


2/16/19 09:00      Nasal Cannula 2.0 


 


2/16/19 08:00 97.3 102 19 126/75 (92) 99   


 


2/16/19 04:00 98.0 79 18 132/80 (97) 97   


 


2/16/19 01:21  79 21  95 Facial  30


 


2/16/19 00:00 97.6 83 18 120/67 (84) 96   


 


2/15/19 23:00  77 18  94 Facial  30


 


2/15/19 22:52      Nasal Cannula 2.0 


 


2/15/19 20:00 98.3 90 18 124/69 (87) 97   

















Intake and Output  


 


 2/15/19 2/16/19





 19:00 07:00


 


Intake Total 960 ml 840 ml


 


Output Total  1200 ml


 


Balance 960 ml -360 ml


 


  


 


Intake Oral 960 ml 840 ml


 


Output Urine Total  1200 ml


 


# Voids 1 1


 


# Bowel Movements 1 








Laboratory Tests


2/16/19 04:00: HIV (1&2) Antibody Rapid Negative


Height (Feet):  5


Height (Inches):  5.00


Weight (Pounds):  123


General Appearance:  alert


Neck:  supple


Cardiovascular:  normal rate


Respiratory/Chest:  lungs clear


Abdomen:  soft











Melia Coombs MD Feb 16, 2019 18:51

## 2019-02-16 NOTE — GENERAL PROGRESS NOTE
Assessment/Plan


Problem List:  


(1) Hepatitis C


ICD Codes:  B19.20 - Unspecified viral hepatitis C without hepatic coma


SNOMED:  76855426


(2) Transaminitis


ICD Codes:  R74.0 - Nonspecific elevation of levels of transaminase and lactic 

acid dehydrogenase [LDH]


SNOMED:  670655370, 714190651


(3) COPD exacerbation


ICD Codes:  J44.1 - Chronic obstructive pulmonary disease with (acute) 

exacerbation


SNOMED:  754350925


(4) Acute metabolic encephalopathy


ICD Codes:  G93.41 - Metabolic encephalopathy


SNOMED:  54903291, 726884224


Assessment/Plan


Transaminitis resolved


Patient now off BiPAP


Hepatitis panel reviewed.  Patient positive for hepatitis C





Symptomatic treatment


on soft diet


Zofran as needed


ppi


fu labs


PT evaluation


Outpatient GI procedures, outpatient hepatitis C tx





Subjective


ROS Limited/Unobtainable:  No


Allergies:  


Coded Allergies:  


     No Known Allergies (Unverified , 2/5/19)





Objective





Last 24 Hour Vital Signs








  Date Time  Temp Pulse Resp B/P (MAP) Pulse Ox O2 Delivery O2 Flow Rate FiO2


 


2/16/19 09:00      Nasal Cannula 2.0 


 


2/16/19 08:00 97.3 102 19 126/75 (92) 99   


 


2/16/19 04:00 98.0 79 18 132/80 (97) 97   


 


2/16/19 01:21  79 21  95 Facial  30


 


2/16/19 00:00 97.6 83 18 120/67 (84) 96   


 


2/15/19 23:00  77 18  94 Facial  30


 


2/15/19 22:52      Nasal Cannula 2.0 


 


2/15/19 20:00 98.3 90 18 124/69 (87) 97   


 


2/15/19 16:00 98.0 72 20 147/86 (106) 97   


 


2/15/19 12:00 97.5 64 20 137/78 (97) 93   

















Intake and Output  


 


 2/15/19 2/16/19





 19:00 07:00


 


Intake Total 960 ml 840 ml


 


Output Total  1200 ml


 


Balance 960 ml -360 ml


 


  


 


Intake Oral 960 ml 840 ml


 


Output Urine Total  1200 ml


 


# Voids 1 1


 


# Bowel Movements 1 








Laboratory Tests


2/16/19 04:00: HIV (1&2) Antibody Rapid [Pending]


Height (Feet):  5


Height (Inches):  5.00


Weight (Pounds):  123


General Appearance:  alert


EENT:  normal ENT inspection


Neck:  supple


Cardiovascular:  normal rate


Respiratory/Chest:  decreased breath sounds


Abdomen:  normal bowel sounds, non tender, soft


Extremities:  non-tender











Juan Miguel Ndiaye MD Feb 16, 2019 10:20

## 2019-02-16 NOTE — NUR
CASE MANAGEMENT: REVIEW



2/16/2019



SI: COPD EXACERBATION. PNA.

T 97.9 HR 72 RR 19 B/P 132/87 SATS 97% ON 2L/NC 

NO LABS TODAY 





IS: PREDNISONE PO QD

HEPARIN SUBQ Q12H

AZITHROMYCIN PO QD

ALBUTEROL HHN Q6H

CEFTRIAXONE IV Q24H





***MED/SURG STATUS**

## 2019-02-16 NOTE — NUR
NURSE NOTES:

Received patient in bed, lying. calm and comfortable. had breakfast and tolerating food 
intake well. no pain/ discomfort noted. On r/a and saturating well. kept bed in the lowest 
position. siderails are up x3. call light is within reach. will cont to monitor.

## 2019-02-16 NOTE — NUR
CHARGE NURSE NOTES:

LEFT VOICEMAIL WITH DR. PIERRE CASTILLO REGARDING DISCHARGE ORDER. AWAITING RESPONSE

## 2019-02-16 NOTE — NUR
NURSE NOTES:



Received patient awake,verbal,confused at times,resting in bed,no shortness of breath noted.

## 2019-02-16 NOTE — NUR
NURSE NOTES:

patient rested well and comfortable. no significant change of condition noted throughout the 
shift. appetite is excellent. No acute resp distress noted. bed is in the lowest position. 
call light is within reach. will cont to monitor.

## 2019-02-16 NOTE — NUR
NURSE NOTES: RESTED WELL, NO SIGNIFICANT CHANGE OF CONDITION NOTED THROUGHOUT THE NIGHT. 
SAFETY MAINTAINED. NAD.

## 2019-02-17 VITALS — SYSTOLIC BLOOD PRESSURE: 130 MMHG | DIASTOLIC BLOOD PRESSURE: 79 MMHG

## 2019-02-17 VITALS — SYSTOLIC BLOOD PRESSURE: 134 MMHG | DIASTOLIC BLOOD PRESSURE: 81 MMHG

## 2019-02-17 VITALS — SYSTOLIC BLOOD PRESSURE: 135 MMHG | DIASTOLIC BLOOD PRESSURE: 81 MMHG

## 2019-02-17 VITALS — SYSTOLIC BLOOD PRESSURE: 117 MMHG | DIASTOLIC BLOOD PRESSURE: 87 MMHG

## 2019-02-17 VITALS — SYSTOLIC BLOOD PRESSURE: 113 MMHG | DIASTOLIC BLOOD PRESSURE: 56 MMHG

## 2019-02-17 RX ADMIN — HEPARIN SODIUM SCH UNITS: 5000 INJECTION INTRAVENOUS; SUBCUTANEOUS at 20:36

## 2019-02-17 RX ADMIN — HEPARIN SODIUM SCH UNITS: 5000 INJECTION INTRAVENOUS; SUBCUTANEOUS at 08:22

## 2019-02-17 NOTE — GENERAL PROGRESS NOTE
Assessment/Plan


Problem List:  


(1) COPD exacerbation


ICD Codes:  J44.1 - Chronic obstructive pulmonary disease with (acute) 

exacerbation


SNOMED:  070605352


(2) Transaminitis


ICD Codes:  R74.0 - Nonspecific elevation of levels of transaminase and lactic 

acid dehydrogenase [LDH]


SNOMED:  244221568, 332815406


Status:  progressing


Assessment/Plan


copd


intermittent bipap


afebrile


vitals stable


occasional wheezing


reviewed chart and labs





Subjective


ROS Limited/Unobtainable:  Yes


Allergies:  


Coded Allergies:  


     No Known Allergies (Unverified , 2/5/19)





Objective





Last 24 Hour Vital Signs








  Date Time  Temp Pulse Resp B/P (MAP) Pulse Ox O2 Delivery O2 Flow Rate FiO2


 


2/17/19 12:00 97.0 86 19 117/87 (97) 95   


 


2/17/19 11:31      Nasal Cannula 2.0 28


 


2/17/19 11:31     99 Nasal Cannula 2.0 28


 


2/17/19 09:00      Nasal Cannula 2.0 


 


2/17/19 08:00 96.4 78 18 130/79 (96) 98   


 


2/17/19 04:09 97.6 66 18 113/56 (75) 94   


 


2/16/19 23:35  72 22  96 Facial  30


 


2/16/19 20:03      Nasal Cannula 2.0 


 


2/16/19 19:48 96.8 70 18 136/80 (98) 92   


 


2/16/19 16:00 97.8 70 18 113/81 (92) 94   

















Intake and Output  


 


 2/16/19 2/17/19





 19:00 07:00


 


Intake Total 720 ml 360 ml


 


Balance 720 ml 360 ml


 


  


 


Intake Oral 720 ml 360 ml


 


# Voids 3 3








Height (Feet):  5


Height (Inches):  5.00


Weight (Pounds):  123


General Appearance:  confused


Neck:  supple


Cardiovascular:  normal rate


Respiratory/Chest:  lungs clear











Melia Coombs MD Feb 17, 2019 13:36

## 2019-02-17 NOTE — GENERAL PROGRESS NOTE
Assessment/Plan


Problem List:  


(1) Hepatitis C


ICD Codes:  B19.20 - Unspecified viral hepatitis C without hepatic coma


SNOMED:  73983339


(2) Transaminitis


ICD Codes:  R74.0 - Nonspecific elevation of levels of transaminase and lactic 

acid dehydrogenase [LDH]


SNOMED:  957156139, 737450612


(3) COPD exacerbation


ICD Codes:  J44.1 - Chronic obstructive pulmonary disease with (acute) 

exacerbation


SNOMED:  522561024


(4) Acute metabolic encephalopathy


ICD Codes:  G93.41 - Metabolic encephalopathy


SNOMED:  41133284, 346109493


Assessment/Plan


Transaminitis resolved


Patient now off BiPAP


Hepatitis panel reviewed.  Patient positive for hepatitis C





Symptomatic treatment


on soft diet


Zofran as needed


ppi


fu labs


PT evaluation


Outpatient GI procedures, outpatient hepatitis C tx





Subjective


ROS Limited/Unobtainable:  No


Allergies:  


Coded Allergies:  


     No Known Allergies (Unverified , 2/5/19)





Objective





Last 24 Hour Vital Signs








  Date Time  Temp Pulse Resp B/P (MAP) Pulse Ox O2 Delivery O2 Flow Rate FiO2


 


2/17/19 09:00      Nasal Cannula 2.0 


 


2/17/19 08:00 96.4 78 18 130/79 (96) 98   


 


2/17/19 04:09 97.6 66 18 113/56 (75) 94   


 


2/16/19 23:35  72 22  96 Facial  30


 


2/16/19 20:03      Nasal Cannula 2.0 


 


2/16/19 19:48 96.8 70 18 136/80 (98) 92   


 


2/16/19 16:00 97.8 70 18 113/81 (92) 94   


 


2/16/19 12:00 97.9 72 19 132/87 (102) 97   

















Intake and Output  


 


 2/16/19 2/17/19





 19:00 07:00


 


Intake Total 720 ml 360 ml


 


Balance 720 ml 360 ml


 


  


 


Intake Oral 720 ml 360 ml


 


# Voids 3 3








Height (Feet):  5


Height (Inches):  5.00


Weight (Pounds):  123


General Appearance:  no apparent distress


EENT:  normal ENT inspection


Neck:  supple


Cardiovascular:  normal rate


Respiratory/Chest:  decreased breath sounds


Abdomen:  normal bowel sounds, non tender, soft


Extremities:  non-tender











Juan Miguel Ndiaye MD Feb 17, 2019 10:46

## 2019-02-17 NOTE — INFECTIOUS DISEASES PROG NOTE
Assessment/Plan


Assessment/Plan


A:


1. COPD exacerbation,


2. Hypercapnic respiratory failure.


3. Homelessness.


4. Elevated transaminase, likely hepatitis C


5. Leukocytosis resolved


6. ? pneumonia


P:


Observe off antibiotic


HIV test





Subjective


ROS Limited/Unobtainable:  Yes


Respiratory:  Reports: shortness of breath


Allergies:  


Coded Allergies:  


     No Known Allergies (Unverified , 2/5/19)





Objective


Vital Signs





Last 24 Hour Vital Signs








  Date Time  Temp Pulse Resp B/P (MAP) Pulse Ox O2 Delivery O2 Flow Rate FiO2


 


2/17/19 12:00 97.0 86 19 117/87 (97) 95   


 


2/17/19 11:31      Nasal Cannula 2.0 28


 


2/17/19 11:31     99 Nasal Cannula 2.0 28


 


2/17/19 09:00      Nasal Cannula 2.0 


 


2/17/19 08:00 96.4 78 18 130/79 (96) 98   


 


2/17/19 04:09 97.6 66 18 113/56 (75) 94   


 


2/16/19 23:35  72 22  96 Facial  30


 


2/16/19 20:03      Nasal Cannula 2.0 


 


2/16/19 19:48 96.8 70 18 136/80 (98) 92   


 


2/16/19 16:00 97.8 70 18 113/81 (92) 94   








Height (Feet):  5


Height (Inches):  5.00


Weight (Pounds):  123


HEENT:  other - on BIPAP


Respiratory/Chest:  rhonchi - bilaterally


Cardiovascular:  normal rate


Abdomen:  soft, non tender


Extremities:  no edema


Neurologic/Psychiatric:  alert, responsive





Current Medications








 Medications


  (Trade)  Dose


 Ordered  Sig/Mona


 Route


 PRN Reason  Start Time


 Stop Time Status Last Admin


Dose Admin


 


 Famotidine


  (Pepcid)  20 mg  DAILY


 ORAL


   2/11/19 09:00


 3/10/19 08:59  2/17/19 08:21


 


 


 Haloperidol


 Lactate


  (Haldol)  5 mg  Q6H  PRN


 IM


 Agitation  2/11/19 08:00


 3/10/19 19:59   


 


 


 Heparin Sodium


  (Porcine)


  (Heparin 5000


 units/ml)  5,000 units  EVERY 12  HOURS


 SUBQ


   2/11/19 09:00


 3/9/19 20:59  2/17/19 08:22


 


 


 Ibuprofen


  (Advil)  400 mg  Q6H  PRN


 ORAL


 For Pain  2/11/19 08:45


 3/7/19 20:44   


 


 


 Prednisone


  (predniSONE)  60 mg  Q24H


 ORAL


   2/11/19 18:00


 3/10/19 17:59  2/16/19 17:19


 


 


 Risperidone


  (RisperDAL)  2 mg  QHS


 ORAL


   2/13/19 21:00


 3/15/19 20:59  2/16/19 20:49


 

















Elijah Lewis MD Feb 17, 2019 13:52

## 2019-02-17 NOTE — GENERAL PROGRESS NOTE
Assessment/Plan


Problem List:  


(1) Acute metabolic encephalopathy


ICD Codes:  G93.41 - Metabolic encephalopathy


SNOMED:  34712155, 438479032


Assessment/Plan


haldol im


ativan im


risperdal 2mg po qhs


cont bilat restraints





Subjective


Neurologic/Psychiatric:  Reports: anxiety, depressed, emotional problems


Allergies:  


Coded Allergies:  


     No Known Allergies (Unverified , 2/5/19)


Subjective


 alert but still disorganized and disoriented.





Objective





Last 24 Hour Vital Signs








  Date Time  Temp Pulse Resp B/P (MAP) Pulse Ox O2 Delivery O2 Flow Rate FiO2


 


2/17/19 16:00 98.6 79 20 134/81 (98) 96   


 


2/17/19 12:00 97.0 86 19 117/87 (97) 95   


 


2/17/19 11:31      Nasal Cannula 2.0 28


 


2/17/19 11:31     99 Nasal Cannula 2.0 28


 


2/17/19 09:00      Nasal Cannula 2.0 


 


2/17/19 08:00 96.4 78 18 130/79 (96) 98   


 


2/17/19 04:09 97.6 66 18 113/56 (75) 94   


 


2/16/19 23:35  72 22  96 Facial  30

















Intake and Output  


 


 2/16/19 2/17/19





 18:59 06:59


 


Intake Total 720 ml 360 ml


 


Balance 720 ml 360 ml


 


  


 


Intake Oral 720 ml 360 ml


 


# Voids 3 3








Height (Feet):  5


Height (Inches):  5.00


Weight (Pounds):  123











Eddie Irizarry MD Feb 17, 2019 20:09

## 2019-02-18 VITALS — SYSTOLIC BLOOD PRESSURE: 126 MMHG | DIASTOLIC BLOOD PRESSURE: 70 MMHG

## 2019-02-18 VITALS — SYSTOLIC BLOOD PRESSURE: 122 MMHG | DIASTOLIC BLOOD PRESSURE: 78 MMHG

## 2019-02-18 VITALS — SYSTOLIC BLOOD PRESSURE: 140 MMHG | DIASTOLIC BLOOD PRESSURE: 81 MMHG

## 2019-02-18 VITALS — SYSTOLIC BLOOD PRESSURE: 130 MMHG | DIASTOLIC BLOOD PRESSURE: 74 MMHG

## 2019-02-18 RX ADMIN — HEPARIN SODIUM SCH UNITS: 5000 INJECTION INTRAVENOUS; SUBCUTANEOUS at 08:34

## 2019-02-18 NOTE — NUR
*-* DISCHARGE PLANNING *-*



PATIENT HAS BEEN REFERRED TO:



[facility]

P:[phone]

F:[phone]

## 2019-02-18 NOTE — NUR
NURSE NOTES:

Received patient asleep, easily arousable. bed in lowest position. Call light made within 
reach. Will cont to monitor patient.

## 2019-02-18 NOTE — NUR
*-* DISCHARGE PLANNING *-*



PATIENT IS DISCHARGED TO:



[facility]

ROOM# [address]

SKILLED

T:[phone] FOR NURSE TO NURSE REPORT

[facility] HAS BEEN ARRANGED FOR  AT 1730 S/W ABIGAIL

## 2019-02-18 NOTE — INFECTIOUS DISEASES PROG NOTE
Assessment/Plan


Assessment/Plan


antibiotics : none





A


1. pneumonia s/p rx


2. COPD exacerbation


3. leucocytosis resolved


4. hepatitis C





P


1. continue off antibiotics





Subjective


ROS Limited/Unobtainable:  Yes


Allergies:  


Coded Allergies:  


     No Known Allergies (Unverified , 2/5/19)





Objective


Vital Signs





Last 24 Hour Vital Signs








  Date Time  Temp Pulse Resp B/P (MAP) Pulse Ox O2 Delivery O2 Flow Rate FiO2


 


2/18/19 09:00      Nasal Cannula 2.0 


 


2/18/19 08:00 97.6 91 18 140/81 (100) 98   


 


2/18/19 04:00  76 16 130/74 (92) 94   


 


2/18/19 01:00  69 21  98 Facial  30


 


2/17/19 22:20  69 17  96 Facial  30


 


2/17/19 21:13  71 18  95 Facial  30


 


2/17/19 20:50      Nasal Cannula 2.0 


 


2/17/19 20:00 97.9 85 18 135/81 (99) 98   


 


2/17/19 16:00 98.6 79 20 134/81 (98) 96   


 


2/17/19 12:00 97.0 86 19 117/87 (97) 95   








Height (Feet):  5


Height (Inches):  5.00


Weight (Pounds):  123


Respiratory/Chest:  lungs clear


Cardiovascular:  normal rate, regular rhythm, no gallop/murmur


Abdomen:  soft, non tender


Extremities:  no edema





Current Medications








 Medications


  (Trade)  Dose


 Ordered  Sig/Mona


 Route


 PRN Reason  Start Time


 Stop Time Status Last Admin


Dose Admin


 


 Famotidine


  (Pepcid)  20 mg  DAILY


 ORAL


   2/11/19 09:00


 3/10/19 08:59  2/17/19 08:21


 


 


 Haloperidol


 Lactate


  (Haldol)  5 mg  Q6H  PRN


 IM


 Agitation  2/11/19 08:00


 3/10/19 19:59   


 


 


 Heparin Sodium


  (Porcine)


  (Heparin 5000


 units/ml)  5,000 units  EVERY 12  HOURS


 SUBQ


   2/11/19 09:00


 3/9/19 20:59  2/17/19 08:22


 


 


 Ibuprofen


  (Advil)  400 mg  Q6H  PRN


 ORAL


 For Pain  2/11/19 08:45


 3/7/19 20:44   


 


 


 Prednisone


  (predniSONE)  60 mg  Q24H


 ORAL


   2/11/19 18:00


 3/10/19 17:59  2/17/19 17:11


 


 


 Risperidone


  (RisperDAL)  2 mg  QHS


 ORAL


   2/13/19 21:00


 3/15/19 20:59  2/17/19 20:36


 

















Kaia Becker MD Feb 18, 2019 11:33

## 2019-02-18 NOTE — GENERAL PROGRESS NOTE
Assessment/Plan


Problem List:  


(1) COPD exacerbation


ICD Codes:  J44.1 - Chronic obstructive pulmonary disease with (acute) 

exacerbation


SNOMED:  924579669


(2) Transaminitis


ICD Codes:  R74.0 - Nonspecific elevation of levels of transaminase and lactic 

acid dehydrogenase [LDH]


SNOMED:  426422873, 295552018


Status:  tolerating diet


Assessment/Plan


copd


no wheezing


afebrile


on oxygen prn


reviewed chart and labs





Subjective


ROS Limited/Unobtainable:  Yes


Allergies:  


Coded Allergies:  


     No Known Allergies (Unverified , 2/5/19)





Objective





Last 24 Hour Vital Signs








  Date Time  Temp Pulse Resp B/P (MAP) Pulse Ox O2 Delivery O2 Flow Rate FiO2


 


2/18/19 13:30  84 24  98 Facial  30


 


2/18/19 12:00 97.8 84 18 122/78 (93) 98   


 


2/18/19 11:20  72 20  98 Facial  30


 


2/18/19 09:00      Nasal Cannula 2.0 


 


2/18/19 08:00 97.6 91 18 140/81 (100) 98   


 


2/18/19 04:00  76 16 130/74 (92) 94   


 


2/18/19 01:00  69 21  98 Facial  30


 


2/17/19 22:20  69 17  96 Facial  30


 


2/17/19 21:13  71 18  95 Facial  30


 


2/17/19 20:50      Nasal Cannula 2.0 


 


2/17/19 20:00 97.9 85 18 135/81 (99) 98   


 


2/17/19 16:00 98.6 79 20 134/81 (98) 96   

















Intake and Output  


 


 2/17/19 2/18/19





 19:00 07:00


 


Intake Total 600 ml 280 ml


 


Balance 600 ml 280 ml


 


  


 


Intake Oral 600 ml 280 ml


 


# Voids 3 3








Height (Feet):  5


Height (Inches):  5.00


Weight (Pounds):  123


EENT:  TMs normal


Neck:  normal alignment, supple


Cardiovascular:  normal rate


Respiratory/Chest:  lungs clear











Melia Coombs MD Feb 18, 2019 15:03

## 2019-02-18 NOTE — NUR
NURSE NOTES:

Patient is awaiting to be discharged, pending arrival of the ambulance, condition stable, no 
acute distress noted.

## 2019-02-18 NOTE — GENERAL PROGRESS NOTE
Assessment/Plan


Problem List:  


(1) Acute metabolic encephalopathy


ICD Codes:  G93.41 - Metabolic encephalopathy


SNOMED:  05618204, 790588175


Assessment/Plan


haldol im


ativan im


risperdal 2mg po qhs


cont bilat restraints





Subjective


Constitutional:  Reports: malaise, weakness


Allergies:  


Coded Allergies:  


     No Known Allergies (Unverified , 2/5/19)





Objective





Last 24 Hour Vital Signs








  Date Time  Temp Pulse Resp B/P (MAP) Pulse Ox O2 Delivery O2 Flow Rate FiO2


 


2/18/19 16:00 97.2 61 18 126/70 (88) 98   


 


2/18/19 13:30  84 24  98 Facial  30


 


2/18/19 12:00 97.8 84 18 122/78 (93) 98   


 


2/18/19 11:20  72 20  98 Facial  30


 


2/18/19 09:00      Nasal Cannula 2.0 


 


2/18/19 08:00 97.6 91 18 140/81 (100) 98   


 


2/18/19 04:00  76 16 130/74 (92) 94   


 


2/18/19 01:00  69 21  98 Facial  30


 


2/17/19 22:20  69 17  96 Facial  30

















Intake and Output  


 


 2/17/19 2/18/19





 18:59 06:59


 


Intake Total 600 ml 280 ml


 


Balance 600 ml 280 ml


 


  


 


Intake Oral 600 ml 280 ml


 


# Voids 3 3








Height (Feet):  5


Height (Inches):  5.00


Weight (Pounds):  123


General Appearance:  WD/WN, no apparent distress, alert











Eddie Irizarry MD Feb 18, 2019 21:46

## 2019-02-18 NOTE — GI PROGRESS NOTE
Assessment/Plan


Problems:  


(1) Transaminitis


ICD Codes:  R74.0 - Nonspecific elevation of levels of transaminase and lactic 

acid dehydrogenase [LDH]


SNOMED:  270699217, 357569089


(2) COPD exacerbation


ICD Codes:  J44.1 - Chronic obstructive pulmonary disease with (acute) 

exacerbation


SNOMED:  377121889


Status:  stable


Status Narrative


Discussed with Dr. Ndiaye


Assessment/Plan


Transaminitis resolved


Patient now off BiPAP


Hepatitis panel reviewed.  Patient positive for hepatitis C





Symptomatic treatment


okay to advance to regular diet, tolerating


Zofran as needed


ppi


fu labs


PT evaluation


Outpatient GI procedures, outpatient hepatitis C tx





The patient was seen and examined at bedside and all new and available data was 

reviewed in the patients chart. I agree with the above findings, impression 

and plan.  (Patient seen earlier today. Signature stamp does not reflect 

patient encounter time.). - Juan Miguel Ndiaye MD





Subjective


Gastrointestinal/Abdominal:  Reports: no symptoms


Subjective


Denies any abdominal pain


Denies any nausea or vomiting


Denies any constipation or diarrhea


Tolerating food





Objective





Last 24 Hour Vital Signs








  Date Time  Temp Pulse Resp B/P (MAP) Pulse Ox O2 Delivery O2 Flow Rate FiO2


 


2/18/19 09:00      Nasal Cannula 2.0 


 


2/18/19 08:00 97.6 91 18 140/81 (100) 98   


 


2/18/19 04:00  76 16 130/74 (92) 94   


 


2/18/19 01:00  69 21  98 Facial  30


 


2/17/19 22:20  69 17  96 Facial  30


 


2/17/19 21:13  71 18  95 Facial  30


 


2/17/19 20:50      Nasal Cannula 2.0 


 


2/17/19 20:00 97.9 85 18 135/81 (99) 98   


 


2/17/19 16:00 98.6 79 20 134/81 (98) 96   


 


2/17/19 12:00 97.0 86 19 117/87 (97) 95   


 


2/17/19 11:31      Nasal Cannula 2.0 28


 


2/17/19 11:31     99 Nasal Cannula 2.0 28

















Intake and Output  


 


 2/17/19 2/18/19





 19:00 07:00


 


Intake Total 600 ml 280 ml


 


Balance 600 ml 280 ml


 


  


 


Intake Oral 600 ml 280 ml


 


# Voids 3 3








Height (Feet):  5


Height (Inches):  5.00


Weight (Pounds):  123


General Appearance:  WD/WN, no apparent distress, alert, thin


Cardiovascular:  normal rate


Respiratory/Chest:  normal breath sounds, no respiratory distress


Abdominal Exam:  normal bowel sounds, non tender, soft


Extremities:  normal range of motion, non-tender











Stacy Hernandez NP Feb 18, 2019 11:15

## 2019-02-18 NOTE — NUR
NURSE NOTES:

Patient has been picked up by ambulance, report is given to ambulance staff, patient is in 
stable condition.

## 2019-02-18 NOTE — NUR
NURSE NOTES:

Obtained order from Dr. roberts to [place] patient to [facility] meds. 
Report given to GABINO Newsome

## 2019-02-20 NOTE — DISCHARGE SUMMARY
Discharge Summary


Discharge Summary


_


  DATE OF ADMISSION: 2/05 /2019 





DATE OF DISCHARGE: 2/18/2019





DISCHARGED BY: 





REASON FOR ADMISSION: 


58 years old female with past medical history of COPD,  presented to emergency 

department with generalized weakness and difficulty breathing.  


History was markedly limited due to patient's mental status.  


Patient was found to be hypoxic with  pulse oximetry  only 80% on  face mask.  


Laboratory workup revealed no leukocytosis, stable hemoglobin and  hematocrit


Urinalysis revealed +! protein , +2 blood , no evidence of UTI .


Stable electrolytes.   BUN 24, creatinine 0.7.


Troponin was 0.016.  EKG revealed normal sinus rhythm, no acute ischemic 

changes.  PrBNP 914


Albumin 3.3.


Serum alcohol level less than 3.


ABG revealed respiratory acidosis with pH 7.27 PO2 66.700 on 100% 

nonrebreathing mask.


Chest x-ray revealed nonspecific interstitial prominence.


Patient started on nebulizing treatment with bronchodilator.  Patient provided 

with IV steroids.


Repeated ABG showed small improvement still acute respiratory acidosis.


Patient admitted to COPD exacerbation.





CONSULTANTS:


pulmonary Dr. Lopez


ID specialist Dr. Mendez


GI specialist Dr. Ndiaye


psychiatrist 


 


[facility] COURSE: 


Patient admitted and started on  BiPAP.  


Settings were titrated based on ABG. 


Patient started on  steroids and  empiric antibiotics.  


Blood culture were negative.


Influenza screen test was negative.  


Follow-up chest x-ray revealed developing infiltrate in the right upper lobe. 


Antibiotics continued . 


Leukocytosis  with WBC 15 on 2/7, resolved.  


Patient initially was on the BiPAP with close monitoring , but eventually was  

able to  be weaned to face mask  and  then to oxygen via nasal cannula.


BiPAP was provided as needed and  at nighttime.  Steroids were slowly tapered 

down. Nebulizing treatment with DuoNeb provided around-the-clock and as needed.


 DVT prophylaxis provided.  


Patient completed course of antibiotic for pneumonia.  


Patient remained afebrile, no leukocytosis.  


 


GI specialist followed.  


Bedside swallow evaluation revealed evidence  of dysphagia and silent 

aspiration risk.  


Diet provided  as per speech therapist recommendations  with one-to-one 

feeding.  


Swallow management and treatment provided.  


LFT were closely monitored.   


HIV test was nonreactive.  


Hepatitis panel revealed evidence of hepatitis C.  


Transaminitis resolved.  


Symptomatic treatment provided.  


Patient was advanced to regular diet as tolerated with strict aspiration 

precaution.  


Patient started on PPI.  


Antiemetic provided as needed.  


Patient was mobilized with the help of physical therapy.  Fall precaution 

maintained.  


GI specialist recommended outpatient GI procedure and outpatient treatment for 

hepatitis C





Psychiatrist followed and diagnosed patient with  acute metabolic encephalopathy

,  which was likely due to r  severe hypoxemia and hypercapnia. 


Psychiatric medication provided as per psychiatrist. 


Mental status slowly improved to baseline.    


Patient clinically stabilized and was ready for transfer back to skilled 

nursing facility for continuation of care.





FINAL DIAGNOSES: 


Acute hypoxemic and hypercapnic respiratory failure-resolved 


Acute toxic encephalopathy


COPD with acute exacerbation


Pneumonia , status post treatment


Transaminitis  - resolved


Aspiration risk 


Hepatitis C





DISCHARGE MEDICATIONS:


See Medication Reconciliation list.





DISCHARGE INSTRUCTIONS:


Patient was discharged to the skilled nursing facility. 


Follow up with medical doctor at the facility. 











I have been assigned to dictate discharge summary for this account. 


I was not involved in the patient's management.  [id number]











Tere Arizmendi NP Feb 20, 2019 12:00